# Patient Record
Sex: FEMALE | Race: WHITE | NOT HISPANIC OR LATINO | Employment: UNEMPLOYED | URBAN - METROPOLITAN AREA
[De-identification: names, ages, dates, MRNs, and addresses within clinical notes are randomized per-mention and may not be internally consistent; named-entity substitution may affect disease eponyms.]

---

## 2017-01-30 ENCOUNTER — GENERIC CONVERSION - ENCOUNTER (OUTPATIENT)
Dept: OTHER | Facility: OTHER | Age: 62
End: 2017-01-30

## 2017-06-02 ENCOUNTER — TRANSCRIBE ORDERS (OUTPATIENT)
Dept: ADMINISTRATIVE | Facility: HOSPITAL | Age: 62
End: 2017-06-02

## 2017-06-02 ENCOUNTER — APPOINTMENT (OUTPATIENT)
Dept: LAB | Facility: HOSPITAL | Age: 62
End: 2017-06-02
Attending: INTERNAL MEDICINE
Payer: COMMERCIAL

## 2017-06-02 DIAGNOSIS — E03.9 UNSPECIFIED HYPOTHYROIDISM: ICD-10-CM

## 2017-06-02 DIAGNOSIS — E03.9 UNSPECIFIED HYPOTHYROIDISM: Primary | ICD-10-CM

## 2017-06-02 LAB
ALBUMIN SERPL BCP-MCNC: 3.5 G/DL (ref 3.5–5)
ALP SERPL-CCNC: 138 U/L (ref 46–116)
ALT SERPL W P-5'-P-CCNC: 41 U/L (ref 12–78)
ANION GAP SERPL CALCULATED.3IONS-SCNC: 5 MMOL/L (ref 4–13)
AST SERPL W P-5'-P-CCNC: 36 U/L (ref 5–45)
BASOPHILS # BLD AUTO: 0.03 THOUSANDS/ΜL (ref 0–0.1)
BASOPHILS NFR BLD AUTO: 0 % (ref 0–1)
BILIRUB SERPL-MCNC: 0.6 MG/DL (ref 0.2–1)
BUN SERPL-MCNC: 10 MG/DL (ref 5–25)
CALCIUM SERPL-MCNC: 9 MG/DL (ref 8.3–10.1)
CHLORIDE SERPL-SCNC: 101 MMOL/L (ref 100–108)
CHOLEST SERPL-MCNC: 134 MG/DL (ref 50–200)
CO2 SERPL-SCNC: 34 MMOL/L (ref 21–32)
CREAT SERPL-MCNC: 0.75 MG/DL (ref 0.6–1.3)
CREAT UR-MCNC: 163 MG/DL
EOSINOPHIL # BLD AUTO: 0.49 THOUSAND/ΜL (ref 0–0.61)
EOSINOPHIL NFR BLD AUTO: 3 % (ref 0–6)
ERYTHROCYTE [DISTWIDTH] IN BLOOD BY AUTOMATED COUNT: 13.2 % (ref 11.6–15.1)
EST. AVERAGE GLUCOSE BLD GHB EST-MCNC: 217 MG/DL
GFR SERPL CREATININE-BSD FRML MDRD: >60 ML/MIN/1.73SQ M
GLUCOSE P FAST SERPL-MCNC: 191 MG/DL (ref 65–99)
HBA1C MFR BLD: 9.2 % (ref 4.2–6.3)
HCT VFR BLD AUTO: 39.9 % (ref 34.8–46.1)
HDLC SERPL-MCNC: 42 MG/DL (ref 40–60)
HGB BLD-MCNC: 13.3 G/DL (ref 11.5–15.4)
LDLC SERPL CALC-MCNC: 63 MG/DL (ref 0–100)
LYMPHOCYTES # BLD AUTO: 3.97 THOUSANDS/ΜL (ref 0.6–4.47)
LYMPHOCYTES NFR BLD AUTO: 28 % (ref 14–44)
MCH RBC QN AUTO: 28.7 PG (ref 26.8–34.3)
MCHC RBC AUTO-ENTMCNC: 33.3 G/DL (ref 31.4–37.4)
MCV RBC AUTO: 86 FL (ref 82–98)
MICROALBUMIN UR-MCNC: 10.4 MG/L (ref 0–20)
MICROALBUMIN/CREAT 24H UR: 6 MG/G CREATININE (ref 0–30)
MONOCYTES # BLD AUTO: 0.84 THOUSAND/ΜL (ref 0.17–1.22)
MONOCYTES NFR BLD AUTO: 6 % (ref 4–12)
NEUTROPHILS # BLD AUTO: 8.95 THOUSANDS/ΜL (ref 1.85–7.62)
NEUTS SEG NFR BLD AUTO: 63 % (ref 43–75)
PLATELET # BLD AUTO: 233 THOUSANDS/UL (ref 149–390)
PMV BLD AUTO: 10.9 FL (ref 8.9–12.7)
POTASSIUM SERPL-SCNC: 3.9 MMOL/L (ref 3.5–5.3)
PROT SERPL-MCNC: 7.7 G/DL (ref 6.4–8.2)
RBC # BLD AUTO: 4.64 MILLION/UL (ref 3.81–5.12)
SODIUM SERPL-SCNC: 140 MMOL/L (ref 136–145)
T3 SERPL-MCNC: 1.1 NG/ML (ref 0.6–1.8)
T4 FREE SERPL-MCNC: 1.36 NG/DL (ref 0.76–1.46)
TRIGL SERPL-MCNC: 146 MG/DL
TSH SERPL DL<=0.05 MIU/L-ACNC: 0.36 UIU/ML (ref 0.36–3.74)
WBC # BLD AUTO: 14.28 THOUSAND/UL (ref 4.31–10.16)

## 2017-06-02 PROCEDURE — 80061 LIPID PANEL: CPT

## 2017-06-02 PROCEDURE — 84480 ASSAY TRIIODOTHYRONINE (T3): CPT

## 2017-06-02 PROCEDURE — 84439 ASSAY OF FREE THYROXINE: CPT

## 2017-06-02 PROCEDURE — 82570 ASSAY OF URINE CREATININE: CPT | Performed by: INTERNAL MEDICINE

## 2017-06-02 PROCEDURE — 84443 ASSAY THYROID STIM HORMONE: CPT

## 2017-06-02 PROCEDURE — 85025 COMPLETE CBC W/AUTO DIFF WBC: CPT

## 2017-06-02 PROCEDURE — 83036 HEMOGLOBIN GLYCOSYLATED A1C: CPT

## 2017-06-02 PROCEDURE — 80053 COMPREHEN METABOLIC PANEL: CPT

## 2017-06-02 PROCEDURE — 36415 COLL VENOUS BLD VENIPUNCTURE: CPT

## 2017-06-02 PROCEDURE — 82043 UR ALBUMIN QUANTITATIVE: CPT | Performed by: INTERNAL MEDICINE

## 2017-06-04 ENCOUNTER — GENERIC CONVERSION - ENCOUNTER (OUTPATIENT)
Dept: OTHER | Facility: OTHER | Age: 62
End: 2017-06-04

## 2017-06-13 DIAGNOSIS — D72.829 ELEVATED WHITE BLOOD CELL COUNT: ICD-10-CM

## 2017-06-13 DIAGNOSIS — Z12.31 ENCOUNTER FOR SCREENING MAMMOGRAM FOR MALIGNANT NEOPLASM OF BREAST: ICD-10-CM

## 2017-06-13 DIAGNOSIS — E01.0 IODINE-DEFICIENCY RELATED DIFFUSE GOITER: ICD-10-CM

## 2017-06-13 DIAGNOSIS — M48.061 SPINAL STENOSIS OF LUMBAR REGION: ICD-10-CM

## 2017-06-29 ENCOUNTER — ALLSCRIPTS OFFICE VISIT (OUTPATIENT)
Dept: OTHER | Facility: OTHER | Age: 62
End: 2017-06-29

## 2017-07-17 ENCOUNTER — GENERIC CONVERSION - ENCOUNTER (OUTPATIENT)
Dept: OTHER | Facility: OTHER | Age: 62
End: 2017-07-17

## 2017-07-20 ENCOUNTER — ALLSCRIPTS OFFICE VISIT (OUTPATIENT)
Dept: OTHER | Facility: OTHER | Age: 62
End: 2017-07-20

## 2017-08-01 ENCOUNTER — TRANSCRIBE ORDERS (OUTPATIENT)
Dept: ADMINISTRATIVE | Facility: HOSPITAL | Age: 62
End: 2017-08-01

## 2017-08-01 DIAGNOSIS — H49.02 THIRD NERVE PALSY OF LEFT EYE: Primary | ICD-10-CM

## 2017-08-07 ENCOUNTER — APPOINTMENT (OUTPATIENT)
Dept: LAB | Facility: HOSPITAL | Age: 62
End: 2017-08-07
Payer: COMMERCIAL

## 2017-08-07 DIAGNOSIS — H49.02 THIRD NERVE PALSY OF LEFT EYE: ICD-10-CM

## 2017-08-07 LAB
BUN SERPL-MCNC: 17 MG/DL (ref 5–25)
CREAT SERPL-MCNC: 0.86 MG/DL (ref 0.6–1.3)
GFR SERPL CREATININE-BSD FRML MDRD: 74 ML/MIN/1.73SQ M

## 2017-08-07 PROCEDURE — 84520 ASSAY OF UREA NITROGEN: CPT

## 2017-08-07 PROCEDURE — 36415 COLL VENOUS BLD VENIPUNCTURE: CPT

## 2017-08-07 PROCEDURE — 82565 ASSAY OF CREATININE: CPT

## 2017-08-09 ENCOUNTER — HOSPITAL ENCOUNTER (OUTPATIENT)
Dept: CT IMAGING | Facility: HOSPITAL | Age: 62
Discharge: HOME | End: 2017-08-09
Payer: COMMERCIAL

## 2017-08-09 DIAGNOSIS — H49.02 THIRD NERVE PALSY OF LEFT EYE: ICD-10-CM

## 2017-08-09 PROCEDURE — 70496 CT ANGIOGRAPHY HEAD: CPT

## 2017-08-09 RX ADMIN — IOHEXOL 85 ML: 350 INJECTION, SOLUTION INTRAVENOUS at 10:32

## 2018-01-10 NOTE — RESULT NOTES
Verified Results  VAS LOWER LIMB VENOUS DUPLEX STUDY, UNILATERAL/LIMITED [id number] 01:28PM Naima Hernandez Order Number: [id number]     Test Name Result Flag Reference   VAS LOWER LIMB VENOUS DUPLEX STUDY, UNILATERAL/LIMITED (Report)     [facility]PORT   CLINICAL:   Indications: Other specified soft tissue disorders [M79 89]  C/O pain behind   right knee and calf x 5 days  R/O ruptured Baker's Cyst          CONCLUSION:   Impression:   RIGHT LOWER LIMB: NORMAL   No evidence of acute or chronic deep vein thrombosis   No evidence of superficial thrombophlebitis noted  Doppler evaluation shows a normal response to augmentation maneuvers  Popliteal, posterior tibial and anterior tibial arterial Doppler waveforms are   triphasic  No Baker's Cyst is noted  LEFT LOWER LIMB LIMITED: NORMAL   Evaluation shows no evidence of thrombus in the common femoral vein  Doppler evaluation shows a normal response to augmentation maneuvers  Tech Note: Preliminary report given to Gianfranco Done at Dr Rahul Nguyen office on 3/14/16 @   [id number]        SIGNATURE:   Electronically Signed by: Kristine Gonzalez MD on 2016-03-14 04:58:03 PM

## 2018-01-13 VITALS
WEIGHT: 209 LBS | HEIGHT: 61 IN | DIASTOLIC BLOOD PRESSURE: 70 MMHG | BODY MASS INDEX: 39.46 KG/M2 | HEART RATE: 72 BPM | RESPIRATION RATE: 16 BRPM | SYSTOLIC BLOOD PRESSURE: 138 MMHG

## 2018-01-14 VITALS
RESPIRATION RATE: 16 BRPM | HEART RATE: 72 BPM | TEMPERATURE: 95.3 F | DIASTOLIC BLOOD PRESSURE: 70 MMHG | BODY MASS INDEX: 37.76 KG/M2 | WEIGHT: 200 LBS | SYSTOLIC BLOOD PRESSURE: 140 MMHG | HEIGHT: 61 IN

## 2018-01-17 NOTE — RESULT NOTES
Verified Results  (1) CBC/PLT/DIFF [id number] 09:48AM [facility]     Test Name Result Flag Reference   WBC COUNT 14 28 Thousand/uL H 4 31-10 16   RBC COUNT 4 64 Million/uL  3 81-5 12   HEMOGLOBIN 13 3 g/dL  11 5-15 4   HEMATOCRIT 39 9 %  34 8-46  1   MCV 86 fL  82-98   MCH 28 7 pg  26 8-34 3   MCHC 33 3 g/dL  31 4-37 4   RDW 13 2 %  11 6-15 1   MPV 10 9 fL  8 9-12 7   PLATELET COUNT 042 Thousands/uL  149-390   NEUTROPHILS RELATIVE PERCENT 63 %  43-75   LYMPHOCYTES RELATIVE PERCENT 28 %  14-44   MONOCYTES RELATIVE PERCENT 6 %  4-12   EOSINOPHILS RELATIVE PERCENT 3 %  0-6   BASOPHILS RELATIVE PERCENT 0 %  0-1   NEUTROPHILS ABSOLUTE COUNT 8 95 Thousands/? ??L H 1 85-7 62   LYMPHOCYTES ABSOLUTE COUNT 3 97 Thousands/? ??L  0 60-4 47   MONOCYTES ABSOLUTE COUNT 0 84 Thousand/? ??L  0 17-1 22   EOSINOPHILS ABSOLUTE COUNT 0 49 Thousand/? ??L  0 00-0 61   BASOPHILS ABSOLUTE COUNT 0 03 Thousands/? ??L  0 00-0 10   This bloodwork is non-fasting  Please drink two glasses of water morning of  bloodwork  (1) COMPREHENSIVE METABOLIC PANEL [id number] 66:72ZO [facility]     Test Name Result Flag Reference   SODIUM 140 mmol/L  136-145   POTASSIUM 3 9 mmol/L  3 5-5 3   CHLORIDE 101 mmol/L  100-108   CARBON DIOXIDE 34 mmol/L H 21-32   ANION GAP (CALC) 5 mmol/L  4-13   BLOOD UREA NITROGEN 10 mg/dL  5-25   CREATININE 0 75 mg/dL  0 60-1 30   Standardized to IDMS reference method   CALCIUM 9 0 mg/dL  8 3-10 1   BILI, TOTAL 0 60 mg/dL  0 20-1 00   ALK PHOSPHATAS 138 U/L H    ALT (SGPT) 41 U/L  12-78   AST(SGOT) 36 U/L  5-45   ALBUMIN 3 5 g/dL  3 5-5 0   TOTAL PROTEIN 7 7 g/dL  6 4-8 2   eGFR Non-African American      >60 0 ml/min/1 73sq m   [facility] Disease Education Program recommendations are as follows:  GFR calculation is accurate only with a steady state creatinine  Chronic Kidney disease less than 60 ml/min/1 73 sq  meters  Kidney failure less than 15 ml/min/1 73 sq  meters     GLUCOSE FASTING 191 mg/dL H 65-99     (1) LIPID PANEL, FASTING [id number] 09:48AM Jose Arana     Test Name Result Flag Reference   CHOLESTEROL 134 mg/dL     HDL,DIRECT 42 mg/dL  40-60   Specimen collection should occur prior to Metamizole administration due to the potential for falsely depressed results  LDL CHOLESTEROL CALCULATED 63 mg/dL  0-100   This is a fasting blood test  Water,black tea or black  coffee only after 9:00pm the night before test  Drink 2 glasses of water the morning of test         Triglyceride:         Normal              <150 mg/dl       Borderline High    150-199 mg/dl       High               200-499 mg/dl       Very High          >499 mg/dl  Cholesterol:         Desirable        <200 mg/dl      Borderline High  200-239 mg/dl      High             >239 mg/dl  HDL Cholesterol:        High    >59 mg/dL      Low     <41 mg/dL  LDL CALCULATED:    This screening LDL is a calculated result  It does not have the accuracy of the Direct Measured LDL in the monitoring of patients with hyperlipidemia and/or statin therapy  Direct Measure LDL (HMH954) must be ordered separately in these patients  TRIGLYCERIDES 146 mg/dL  <=150   Specimen collection should occur prior to N-Acetylcysteine or Metamizole administration due to the potential for falsely depressed results  (1) TSH [id number] 09:48AM Jose Arana     Test Name Result Flag Reference   TSH 0 360 uIU/mL  0 358-3 740   This bloodwork is non-fasting  Please drink two glasses of water morning of  bloodwork  Patients undergoing fluorescein dye angiography may retain small amounts of fluorescein in the body for 48-72 hours post procedure  Samples containing fluorescein can produce falsely depressed TSH values  If the patient had this procedure,a specimen should be resubmitted post fluorescein clearance            The recommended reference ranges for TSH during pregnancy are as follows:  First trimester 0 1 to 2 5 uIU/mL  Second trimester  0 2 to 3 0 uIU/mL  Third trimester 0 3 to 3 0 uIU/m     (1) T3 TOTAL 02Jun2017 09:48AM Danie Nett     Test Name Result Flag Reference   T3 1 1 ng/mL  0 6-1 8     (1) T4, FREE 02Jun2017 09:48AM Danie Nett     Test Name Result Flag Reference   T4,FREE 1 36 ng/dL  0 76-1 46     (1) MICROALBUMIN CREATININE RATIO, RANDOM URINE 02Jun2017 09:48AM Danie Nett     Test Name Result Flag Reference   MICROALBUMIN/ CREAT R 6 mg/g creatinine  0-30   MICROALBUMIN,URINE 10 4 mg/L  0 0-20 0   CREATININE URINE 163 0 mg/dL       (1) HEMOGLOBIN A1C [id number] 09:48AM [facility] Nett     Test Name Result Flag Reference   HEMOGLOBIN A1C 9 2 % H 4 2-6 3   EST  AVG   GLUCOSE 217 mg/dl

## 2018-01-24 NOTE — PROGRESS NOTES
Assessment   1  Benign essential hypertension (401 1) (I10)  2  Diabetes (250 00) (E11 9)  3  Hypothyroidism (244 9) (E03 9)  4  Osteoarthritis of both knees (715 96) (M17 0)  5  Thyromegaly (240 9) (E01 0)  6  Lumbar stenosis with neurogenic claudication (724 03) (M48 06)    Plan  Diabetes    · *VB - Foot Exam; Status:Complete - Retrospective Authorization;   Done: 93FLQ6026  08:36AM  Lumbar stenosis with neurogenic claudication    · * MRI LUMBAR SPINE WO CONTRAST; Status:Need Information - Financial  Authorization; Requested RQO:[id number];   PMH: Right leg swelling    · Renew: TraMADol HCl - 50 MG Oral Tablet; take 1 tablet by mouth three times a day if  needed  Thyromegaly    · US THYROID; Status:Hold For - Scheduling; Requested QNP:[id number];     Discussion/Summary  Currently, she eats a healthy diet and has an inadequate exercise regimen  Patient discussion: discussed with the patient  Self Referrals: No      Chief Complaint  Pt here for HM today  no depress--frm ord  do foot---i ord and resul--feet ready  dk    nsr since last here  History of Present Illness  HM, Adult Female: The patient is being seen for a health maintenance evaluation  Social History: Household members include spouse and adult children  She is   Work status: [employer]  The patient has never smoked cigarettes  She reports never drinking alcohol  She has never used illicit drugs  General Health: The patient's health since the last visit is described as good  She has regular dental visits  Lifestyle:  She consumes a diverse and healthy diet  She does not exercise regularly  Screening:   HPI: Diabetes being cared by [facility] in [facility] have been made  Hypertension (Follow-Up): The patient presents for follow-up of essential hypertension  Symptoms:   Osteoarthritis (Follow-Up):  The patient states her osteoarthritis has been has had let knee replaces-but pain mostly in the thighs and back of calves, but worse since the last visit  Symptoms:      Review of Systems    Constitutional: No fever, no chills, feels well, no tiredness, no recent weight gain or weight loss  Cardiovascular: No complaints of slow heart rate, no fast heart rate, no chest pain, no palpitations, no leg claudication, no lower extremity edema  Respiratory: No complaints of shortness of breath, no wheezing, no cough, no SOB on exertion, no orthopnea, no PND  Gastrointestinal: No complaints of abdominal pain, no constipation, no nausea or vomiting, no diarrhea, no bloody stools  Active Problems   1  Antibiotic prophylaxis for dental procedure indicated due to prior joint replacement   (V58 62) (Z79 2)  2  Asthma (493 90) (J45 909)  3  Benign essential hypertension (401 1) (I10)  4  Diabetes (250 00) (E11 9)  5  Elevated WBC count (288 60) (D72 829)  6  Hypothyroidism (244 9) (E03 9)  7  Need for immunization against influenza (V04 81) (Z23)  8  Osteoarthrosis, not specified whether generalized/localized, lower leg (715 96) (M17 9)  9  Screening for colon cancer (V76 51) (Z12 11)  10  Sicca syndrome (710 2) (M35 00)  11   Sjogrens syndrome (710 2) (M35 00)    Past Medical History    · History of cardiac catheterization (V45 89) (Z98 890)   · History of Obesity (278 00) (E66 9)   · History of TMJ (dislocation of temporomandibular joint) (830 0) (S03 00XA)   · History of Trigeminal neuralgia (350 1) (G50 0)    Surgical History    · History of Hysterectomy   · History of Tonsillectomy    Family History  Mother    · Family history of Colon cancer  Father    · Family history of Dementia   · Family history of Depression   · Family history of Diabetes mellitus   · Family history of cerebrovascular accident (V17 1) (Z82 3)   · Family history of Parkinsons disease  Family History    · Family history of GI malignancy (V16 0) (Z80 0)    Social History    · Caffeine use (V49 89) (F15 90)   · 1-2 TIMES PER WEEK   · Dental care, regularly   · Former smoker (V15 82) (Z87 891)   · QUIT OVER 10 YEARS AGO   · Living Situation: Supportive and safe   ·    · No advance directive on file (V49 89) (Z78 9)   · Non drinker / no alcohol use    Current Meds  1  Amoxicillin 500 MG Oral Capsule; TAKE (4) CAPSULES ONE HOUR PRIOR TO DENTAL   PROCEDURE; Therapy: 69Avc7268 to (Last Rx:56Lzj6304)  Requested for: 82Gpo3608 Ordered  2  Aspirin 81 MG TABS; Take 1 tablet daily; Therapy: 65GAS9600 to Recorded  3  Diclofenac Potassium 50 MG Oral Tablet; TAKE 1 TABLET THREE TIMES A DAY AS   NEEDED FOR JOINT PAIN;   Therapy: 06JVW3491 to (Last Rx:34Iwe8147)  Requested for: 49Bkx8860 Ordered  4  Lantus SoloStar 100 UNIT/ML Subcutaneous Solution Pen-injector; 40 units bid     by   endocrine; Therapy: 74ZWJ3582 to Recorded  5  Levemir FlexTouch 100 UNIT/ML Subcutaneous Solution Pen-injector; INJECT 28 UNIT   Twice daily; Therapy: 20YOZ0915 to (Last Rx:49Mxz4380)  Requested for: 05Owm2561 Ordered  6  Levothyroxine Sodium 137 MCG Oral Tablet; TAKE 1 TABLET DAILY; Therapy: 18CRA7596 to (Evaluate:55Ecy4126)  Requested for: 74UDU7583; Last   Rx:56Ibg7448 Ordered  7  Metoprolol Tartrate 50 MG Oral Tablet; TAKE ONE TABLET BY MOUTH TWICE A DAY; Therapy: 51UGM5163 to (Evaluate:21Jun2018)  Requested for: 94HWJ0409; Last   CS:40ZUS6006 Ordered  8  NovoFine 32G X 6 MM Miscellaneous; USING TWO DAILY AND OR AS DIRECTED; Therapy: 72COH6763 to (Last Rx:18Uah7983)  Requested for: 71RGL7714 Ordered  9  NovoLOG FlexPen 100 UNIT/ML Subcutaneous Solution Pen-injector; INJECT 30 UNITS   with meals; Therapy: 81MMU6624 to  Requested for: 49ZHY8522 Recorded  10  OneTouch Ultra Blue In [employer]; 2 new strips daily; Therapy: 64KHN4737 to ([place])  Requested for: 23GWQ5901; Last    VW:38HGQ1546 Ordered  11  Simvastatin 20 MG Oral Tablet; TAKE 1 TABLET DAILY AS DIRECTED;     Therapy: 24NYS5519 to (Evaluate:53Yni5211)  Requested for: 84NEU7703; Last    RQ:32JSU3803 Ordered  12  TraMADol HCl - 50 MG Oral Tablet; take 1 tablet by mouth three times a day if needed; Therapy: 15SQF4951 to (Evaluate:03Apr2016); Last Rx:14Mar2016 Ordered  13  Valsartan-Hydrochlorothiazide 160-25 MG Oral Tablet; TAKE 1 TABLET DAILY; Therapy: 67Qqf7202 to (Evaluate:16Rzf9446)  Requested for: 42QFD7599; Last    Rx:59Fur3321 Ordered    Allergies   1  Actos TABS  2  Altace TABS  3  Ceftin TABS  4  Cefzil TABS  5  Cipro TABS  6  Niacin TABS    Vitals   Recorded: 36IDB3136 08:48AM Recorded: 85MEM9494 08:24AM   Heart Rate  72   Respiration  16   Systolic 019 893   Diastolic 70 68   Height  5 ft 1 in   Weight  209 lb    BMI Calculated  39 49   BSA Calculated  1 92     Physical Exam    Constitutional   General appearance: No acute distress, well appearing and well nourished  Ears, Nose, Mouth, and Throat   Oropharynx: Normal with no erythema, edema, exudate or lesions  Neck   Neck: Supple, symmetric, trachea midline, no masses  Thyroid: Abnormal   generous thyroid  Pulmonary   Auscultation of lungs: Clear to auscultation  Cardiovascular   Auscultation of heart: Normal rate and rhythm, normal S1 and S2, no murmurs  Abdomen   Abdomen: Non-tender, no masses  Liver and spleen: No hepatomegaly or splenomegaly  Musculoskeletal has djd changes on the spine-ok ROM of the hips neg SLR-has no motor loss  Skin1  Diabetic foot exam is done in this spot  There are no lesions or toes are normal  Monofilament testing is normal  Her pulses are +2 in the dorsalis pedis and posterior tibial pulses  1         1 Amended By: Huy Cabrera; Jun 29 2017 9:29 AM EST    Results/Data  PHQ-2 Adult Depression Screening 29Jun2017 08:36AM User, s     Test Name Result Flag Reference   PHQ-2 Adult Depression Score 0     Over the last two weeks, how often have you been bothered by any of the following problems?   Little interest or pleasure in doing things: Not at all - 0  Feeling down, depressed, or hopeless: Not at all - 0   PHQ-2 Adult Depression Screening Negative       *VB - Foot Exam 93UGA4996 08:36AM Anish Grimm     Test Name Result Flag Reference   FOOT Francesca Frederick 27LSZ5780         Provider Comments  Provider Comments:   Needs to get better control of diabetes        Signatures   Electronically signed by : YAMILET Ramirez ; Jun 29 2017  9:29AM EST                       (Author)

## 2018-06-25 ENCOUNTER — OFFICE VISIT (OUTPATIENT)
Dept: FAMILY MEDICINE CLINIC | Facility: HOSPITAL | Age: 63
End: 2018-06-25
Payer: COMMERCIAL

## 2018-06-25 VITALS
HEART RATE: 78 BPM | WEIGHT: 207 LBS | BODY MASS INDEX: 39.08 KG/M2 | DIASTOLIC BLOOD PRESSURE: 78 MMHG | SYSTOLIC BLOOD PRESSURE: 148 MMHG | HEIGHT: 61 IN

## 2018-06-25 DIAGNOSIS — M17.0 PRIMARY OSTEOARTHRITIS OF BOTH KNEES: ICD-10-CM

## 2018-06-25 DIAGNOSIS — I10 BENIGN ESSENTIAL HYPERTENSION: ICD-10-CM

## 2018-06-25 DIAGNOSIS — N63.20 LEFT BREAST MASS: Primary | ICD-10-CM

## 2018-06-25 DIAGNOSIS — E11.9 TYPE 2 DIABETES MELLITUS WITHOUT COMPLICATION, WITHOUT LONG-TERM CURRENT USE OF INSULIN (HCC): ICD-10-CM

## 2018-06-25 PROBLEM — M48.062 LUMBAR STENOSIS WITH NEUROGENIC CLAUDICATION: Status: ACTIVE | Noted: 2017-06-29

## 2018-06-25 PROBLEM — E01.0 THYROMEGALY: Status: ACTIVE | Noted: 2017-06-29

## 2018-06-25 PROBLEM — F41.9 ANXIETY: Status: ACTIVE | Noted: 2017-07-20

## 2018-06-25 PROCEDURE — 99213 OFFICE O/P EST LOW 20 MIN: CPT | Performed by: INTERNAL MEDICINE

## 2018-06-25 RX ORDER — DICLOFENAC POTASSIUM 50 MG/1
TABLET, FILM COATED ORAL
COMMUNITY
Start: 2014-01-28 | End: 2018-10-09 | Stop reason: SDUPTHER

## 2018-06-25 RX ORDER — SIMVASTATIN 20 MG
TABLET ORAL
COMMUNITY
Start: 2018-06-17 | End: 2018-10-09 | Stop reason: SDUPTHER

## 2018-06-25 RX ORDER — TRAMADOL HYDROCHLORIDE 50 MG/1
50 TABLET ORAL 3 TIMES DAILY PRN
Qty: 30 TABLET | Refills: 0 | Status: SHIPPED | OUTPATIENT
Start: 2018-06-25 | End: 2018-10-09 | Stop reason: SDUPTHER

## 2018-06-25 RX ORDER — TRAMADOL HYDROCHLORIDE 50 MG/1
1 TABLET ORAL 3 TIMES DAILY PRN
COMMUNITY
Start: 2016-03-14 | End: 2018-06-25 | Stop reason: SDUPTHER

## 2018-06-25 RX ORDER — METOPROLOL TARTRATE 50 MG/1
TABLET, FILM COATED ORAL
COMMUNITY
Start: 2018-06-17 | End: 2018-10-09 | Stop reason: SDUPTHER

## 2018-06-25 RX ORDER — INSULIN GLARGINE 100 [IU]/ML
36 INJECTION, SOLUTION SUBCUTANEOUS EVERY 12 HOURS SCHEDULED
Qty: 5 PEN | Refills: 0 | Status: SHIPPED | OUTPATIENT
Start: 2018-06-25 | End: 2018-07-30 | Stop reason: SDUPTHER

## 2018-06-25 RX ORDER — LEVOTHYROXINE SODIUM 137 UG/1
TABLET ORAL
COMMUNITY
Start: 2018-06-17 | End: 2018-08-24

## 2018-06-25 RX ORDER — CLINDAMYCIN HYDROCHLORIDE 300 MG/1
CAPSULE ORAL
Refills: 0 | COMMUNITY
Start: 2018-04-02 | End: 2018-08-24 | Stop reason: CLARIF

## 2018-06-25 RX ORDER — BLOOD SUGAR DIAGNOSTIC
STRIP MISCELLANEOUS
COMMUNITY
Start: 2014-10-08

## 2018-06-25 RX ORDER — VALSARTAN AND HYDROCHLOROTHIAZIDE 160; 25 MG/1; MG/1
TABLET ORAL
COMMUNITY
Start: 2018-06-17 | End: 2018-10-09 | Stop reason: SDUPTHER

## 2018-06-25 RX ORDER — AMOXICILLIN 500 MG/1
4 CAPSULE ORAL
COMMUNITY
Start: 2016-12-12 | End: 2019-10-15 | Stop reason: ALTCHOICE

## 2018-06-25 RX ORDER — INSULIN GLARGINE 100 [IU]/ML
INJECTION, SOLUTION SUBCUTANEOUS
COMMUNITY
Start: 2018-05-13 | End: 2018-06-25 | Stop reason: SDUPTHER

## 2018-06-25 NOTE — PROGRESS NOTES
Assessment/Plan:         Problem List Items Addressed This Visit        Endocrine    Type 2 diabetes mellitus without complication, without long-term current use of insulin (HCC)    Relevant Medications    insulin aspart (NOVOLOG FLEXPEN) 100 Units/mL injection pen    LANTUS SOLOSTAR 100 units/mL injection pen    Other Relevant Orders    CBC and differential    Comprehensive metabolic panel    Hemoglobin A1C    Lipid Panel with Direct LDL reflex    Microalbumin / creatinine urine ratio       Cardiovascular and Mediastinum    Benign essential hypertension    Relevant Medications    metoprolol tartrate (LOPRESSOR) 50 mg tablet    valsartan-hydrochlorothiazide (DIOVAN-HCT) 160-25 MG per tablet       Musculoskeletal and Integument    Osteoarthritis of both knees    Relevant Medications    traMADol (ULTRAM) 50 mg tablet      Other Visit Diagnoses     Left breast mass    -  Primary    Relevant Orders    Mammo diagnostic bilateral w 3d & cad    US breast left complete (abus)            Subjective:      Patient ID: Severiano Coppersmith is a 66 y o  female    HPI  Here for special appt for left breast mass- has noted thickening 2 years ago in area then seemed to go away but now is recurrent   Van not had a mamogram for years   Had hysterectomy but no gyn exam for years  sees Dr ramo Hood for her diabetes- has been over a year since labs0- given new slip today and to set up for appt  The following portions of the patient's history were reviewed and updated as appropriate: allergies, current medications and problem list      Review of Systems   Constitutional: Negative for chills and fatigue  HENT: Negative for congestion and dental problem  Respiratory: Negative for apnea, cough and choking  Gastrointestinal: Negative for abdominal distention and abdominal pain  All other systems reviewed and are negative          Objective:      Current Outpatient Prescriptions:     amoxicillin (AMOXIL) 500 mg capsule, Take 4 capsules by mouth, Disp: , Rfl:     aspirin 81 MG tablet, Take 1 tablet by mouth daily, Disp: , Rfl:     diclofenac potassium (CATAFLAM) 50 mg tablet, Take by mouth, Disp: , Rfl:     insulin aspart (NOVOLOG FLEXPEN) 100 Units/mL injection pen, Inject under the skin, Disp: , Rfl:     Insulin Syringe-Needle U-100 (B-D INS SYR ULTRAFINE  3CC/31G) 31G X 5/16" 0 3 ML MISC, by Does not apply route, Disp: , Rfl:     traMADol (ULTRAM) 50 mg tablet, Take 1 tablet (50 mg total) by mouth 3 (three) times a day as needed for moderate pain, Disp: 30 tablet, Rfl: 0    clindamycin (CLEOCIN) 300 MG capsule, TAKE TWO CAPSULES 1 HOUR PRIOR TO APPT , Disp: , Rfl: 0    LANTUS SOLOSTAR 100 units/mL injection pen, Inject 36 Units under the skin every 12 (twelve) hours, Disp: 5 pen, Rfl: 0    levothyroxine 137 mcg tablet, , Disp: , Rfl:     metoprolol tartrate (LOPRESSOR) 50 mg tablet, , Disp: , Rfl:     simvastatin (ZOCOR) 20 mg tablet, , Disp: , Rfl:     valsartan-hydrochlorothiazide (DIOVAN-HCT) 160-25 MG per tablet, , Disp: , Rfl:        Physical Exam   Constitutional: She appears well-developed and well-nourished  No distress  Abdominal: Soft  Bowel sounds are normal    Musculoskeletal: She exhibits no edema or deformity  Skin:   Left breast with firm nodule in inner aspect of upper left breast   Nursing note and vitals reviewed

## 2018-07-06 DIAGNOSIS — E11.8 TYPE 2 DIABETES MELLITUS WITH COMPLICATION, WITH LONG-TERM CURRENT USE OF INSULIN (HCC): Primary | ICD-10-CM

## 2018-07-06 DIAGNOSIS — Z79.4 TYPE 2 DIABETES MELLITUS WITH COMPLICATION, WITH LONG-TERM CURRENT USE OF INSULIN (HCC): Primary | ICD-10-CM

## 2018-07-06 RX ORDER — BLOOD-GLUCOSE METER
KIT MISCELLANEOUS
Qty: 1 EACH | Refills: 0 | Status: SHIPPED | OUTPATIENT
Start: 2018-07-06

## 2018-07-30 DIAGNOSIS — E11.9 TYPE 2 DIABETES MELLITUS WITHOUT COMPLICATION, WITHOUT LONG-TERM CURRENT USE OF INSULIN (HCC): ICD-10-CM

## 2018-07-30 RX ORDER — INSULIN GLARGINE 100 [IU]/ML
INJECTION, SOLUTION SUBCUTANEOUS
Qty: 15 ML | Refills: 0 | Status: SHIPPED | OUTPATIENT
Start: 2018-07-30 | End: 2018-08-29 | Stop reason: SDUPTHER

## 2018-08-21 ENCOUNTER — APPOINTMENT (OUTPATIENT)
Dept: LAB | Facility: HOSPITAL | Age: 63
End: 2018-08-21
Attending: INTERNAL MEDICINE
Payer: COMMERCIAL

## 2018-08-21 DIAGNOSIS — E11.9 TYPE 2 DIABETES MELLITUS WITHOUT COMPLICATION, WITHOUT LONG-TERM CURRENT USE OF INSULIN (HCC): ICD-10-CM

## 2018-08-21 LAB
ALBUMIN SERPL BCP-MCNC: 3.4 G/DL (ref 3.5–5)
ALP SERPL-CCNC: 143 U/L (ref 46–116)
ALT SERPL W P-5'-P-CCNC: 30 U/L (ref 12–78)
ANION GAP SERPL CALCULATED.3IONS-SCNC: 4 MMOL/L (ref 4–13)
AST SERPL W P-5'-P-CCNC: 18 U/L (ref 5–45)
BASOPHILS # BLD AUTO: 0.04 THOUSANDS/ΜL (ref 0–0.1)
BASOPHILS NFR BLD AUTO: 0 % (ref 0–1)
BILIRUB SERPL-MCNC: 0.5 MG/DL (ref 0.2–1)
BUN SERPL-MCNC: 13 MG/DL (ref 5–25)
CALCIUM SERPL-MCNC: 8.9 MG/DL (ref 8.3–10.1)
CHLORIDE SERPL-SCNC: 102 MMOL/L (ref 100–108)
CHOLEST SERPL-MCNC: 126 MG/DL (ref 50–200)
CO2 SERPL-SCNC: 31 MMOL/L (ref 21–32)
CREAT SERPL-MCNC: 0.78 MG/DL (ref 0.6–1.3)
CREAT UR-MCNC: 165 MG/DL
EOSINOPHIL # BLD AUTO: 0.64 THOUSAND/ΜL (ref 0–0.61)
EOSINOPHIL NFR BLD AUTO: 5 % (ref 0–6)
ERYTHROCYTE [DISTWIDTH] IN BLOOD BY AUTOMATED COUNT: 12.8 % (ref 11.6–15.1)
EST. AVERAGE GLUCOSE BLD GHB EST-MCNC: 177 MG/DL
GFR SERPL CREATININE-BSD FRML MDRD: 82 ML/MIN/1.73SQ M
GLUCOSE P FAST SERPL-MCNC: 175 MG/DL (ref 65–99)
HBA1C MFR BLD: 7.8 % (ref 4.2–6.3)
HCT VFR BLD AUTO: 38.4 % (ref 34.8–46.1)
HDLC SERPL-MCNC: 39 MG/DL (ref 40–60)
HGB BLD-MCNC: 12.6 G/DL (ref 11.5–15.4)
IMM GRANULOCYTES # BLD AUTO: 0.05 THOUSAND/UL (ref 0–0.2)
IMM GRANULOCYTES NFR BLD AUTO: 0 % (ref 0–2)
LDLC SERPL CALC-MCNC: 66 MG/DL (ref 0–100)
LYMPHOCYTES # BLD AUTO: 3.95 THOUSANDS/ΜL (ref 0.6–4.47)
LYMPHOCYTES NFR BLD AUTO: 30 % (ref 14–44)
MCH RBC QN AUTO: 28.1 PG (ref 26.8–34.3)
MCHC RBC AUTO-ENTMCNC: 32.8 G/DL (ref 31.4–37.4)
MCV RBC AUTO: 86 FL (ref 82–98)
MICROALBUMIN UR-MCNC: 11.5 MG/L (ref 0–20)
MICROALBUMIN/CREAT 24H UR: 7 MG/G CREATININE (ref 0–30)
MONOCYTES # BLD AUTO: 0.86 THOUSAND/ΜL (ref 0.17–1.22)
MONOCYTES NFR BLD AUTO: 7 % (ref 4–12)
NEUTROPHILS # BLD AUTO: 7.77 THOUSANDS/ΜL (ref 1.85–7.62)
NEUTS SEG NFR BLD AUTO: 58 % (ref 43–75)
NRBC BLD AUTO-RTO: 0 /100 WBCS
PLATELET # BLD AUTO: 271 THOUSANDS/UL (ref 149–390)
PMV BLD AUTO: 11 FL (ref 8.9–12.7)
POTASSIUM SERPL-SCNC: 4.5 MMOL/L (ref 3.5–5.3)
PROT SERPL-MCNC: 7.7 G/DL (ref 6.4–8.2)
RBC # BLD AUTO: 4.48 MILLION/UL (ref 3.81–5.12)
SODIUM SERPL-SCNC: 137 MMOL/L (ref 136–145)
TRIGL SERPL-MCNC: 106 MG/DL
WBC # BLD AUTO: 13.31 THOUSAND/UL (ref 4.31–10.16)

## 2018-08-21 PROCEDURE — 80061 LIPID PANEL: CPT

## 2018-08-21 PROCEDURE — 36415 COLL VENOUS BLD VENIPUNCTURE: CPT

## 2018-08-21 PROCEDURE — 80053 COMPREHEN METABOLIC PANEL: CPT

## 2018-08-21 PROCEDURE — 82570 ASSAY OF URINE CREATININE: CPT

## 2018-08-21 PROCEDURE — 83036 HEMOGLOBIN GLYCOSYLATED A1C: CPT

## 2018-08-21 PROCEDURE — 85025 COMPLETE CBC W/AUTO DIFF WBC: CPT

## 2018-08-21 PROCEDURE — 82043 UR ALBUMIN QUANTITATIVE: CPT

## 2018-08-21 PROCEDURE — 3061F NEG MICROALBUMINURIA REV: CPT | Performed by: INTERNAL MEDICINE

## 2018-08-24 ENCOUNTER — OFFICE VISIT (OUTPATIENT)
Dept: FAMILY MEDICINE CLINIC | Facility: HOSPITAL | Age: 63
End: 2018-08-24
Payer: COMMERCIAL

## 2018-08-24 ENCOUNTER — APPOINTMENT (OUTPATIENT)
Dept: LAB | Facility: HOSPITAL | Age: 63
End: 2018-08-24
Attending: INTERNAL MEDICINE
Payer: COMMERCIAL

## 2018-08-24 VITALS
WEIGHT: 211.5 LBS | HEIGHT: 61 IN | SYSTOLIC BLOOD PRESSURE: 128 MMHG | HEART RATE: 64 BPM | DIASTOLIC BLOOD PRESSURE: 78 MMHG | BODY MASS INDEX: 39.93 KG/M2

## 2018-08-24 DIAGNOSIS — E03.9 ACQUIRED HYPOTHYROIDISM: Primary | ICD-10-CM

## 2018-08-24 DIAGNOSIS — I10 BENIGN ESSENTIAL HYPERTENSION: Primary | ICD-10-CM

## 2018-08-24 DIAGNOSIS — E11.9 TYPE 2 DIABETES MELLITUS WITHOUT COMPLICATION, WITHOUT LONG-TERM CURRENT USE OF INSULIN (HCC): ICD-10-CM

## 2018-08-24 DIAGNOSIS — E03.9 ACQUIRED HYPOTHYROIDISM: ICD-10-CM

## 2018-08-24 DIAGNOSIS — M35.00 H/O SJOGREN'S DISEASE (HCC): ICD-10-CM

## 2018-08-24 DIAGNOSIS — M35.00 SJOGREN'S SYNDROME, WITH UNSPECIFIED ORGAN INVOLVEMENT (HCC): ICD-10-CM

## 2018-08-24 DIAGNOSIS — I73.9 CLAUDICATION (HCC): ICD-10-CM

## 2018-08-24 LAB — TSH SERPL DL<=0.05 MIU/L-ACNC: 0.04 UIU/ML (ref 0.36–3.74)

## 2018-08-24 PROCEDURE — 36415 COLL VENOUS BLD VENIPUNCTURE: CPT

## 2018-08-24 PROCEDURE — 3008F BODY MASS INDEX DOCD: CPT | Performed by: INTERNAL MEDICINE

## 2018-08-24 PROCEDURE — 84443 ASSAY THYROID STIM HORMONE: CPT

## 2018-08-24 PROCEDURE — 99214 OFFICE O/P EST MOD 30 MIN: CPT | Performed by: INTERNAL MEDICINE

## 2018-08-24 NOTE — PROGRESS NOTES
Assessment/Plan:       Diagnoses and all orders for this visit:    Benign essential hypertension    Type 2 diabetes mellitus without complication, without long-term current use of insulin ([facility])    Acquired hypothyroidism  -     TSH, 3rd generation; Future    Sjogren's syndrome, with unspecified organ involvement ([facility] [address] )    H/O Sjogren's disease    Claudication ([facility] [address] )  -     VAS lower limb arterial duplex, complete bilateral; Future          All of the above diagnoses have been assessed  Additional COMMENTS/PLAN:   Will see back in 6 weeks with attention to her symptoms  If the vascular studies are negative will probably need to refer to physical therapy and then if that does not resolve the problem will need to get an MRI of the lumbar spine  Subjective:      Patient ID: Yesica Tang is a 66 y o  female  HPI     Leg pain-this is when she stands in back on the legs  Also gets pain in the lower legs with < 1 block on level ground  Did have some edema earlier in week  DM-  Hemoglobin A1c 7 8  She will drop all sugars  HTN-  Compliant with medications and salt restriction      The following portions of the patient's history were revised and updated as appropriate: Problem list, allergies, med list, FH, SH, Past medical and surgical histories      Current Outpatient Prescriptions   Medication Sig Dispense Refill    amoxicillin (AMOXIL) 500 mg capsule Take 4 capsules by mouth      aspirin 81 MG tablet Take 1 tablet by mouth daily      diclofenac potassium (CATAFLAM) 50 mg tablet Take by mouth      glucose blood (FREESTYLE TEST STRIPS) test strip Testing 4 times daily - Dx- E11 9 100 each 5    glucose monitoring kit (FREESTYLE) monitoring kit Testing 4 times daily - DX- E11 9 1 each 0    insulin aspart (NOVOLOG FLEXPEN) 100 Units/mL injection pen Inject 20 Units under the skin Before meals or sliding       Insulin Syringe-Needle U-100 (B-D INS SYR ULTRAFINE  3CC/31G) 31G X 5/16" 0 3 ML MISC by Does not apply route      LANTUS SOLOSTAR 100 units/mL injection pen INJECT 36 UNITS UNDER THE SKIN EVERY 12 HOURS 15 mL 0    levothyroxine 137 mcg tablet       metoprolol tartrate (LOPRESSOR) 50 mg tablet       simvastatin (ZOCOR) 20 mg tablet       traMADol (ULTRAM) 50 mg tablet Take 1 tablet (50 mg total) by mouth 3 (three) times a day as needed for moderate pain 30 tablet 0    valsartan-hydrochlorothiazide (DIOVAN-HCT) 160-25 MG per tablet        No current facility-administered medications for this visit  Review of Systems   All other systems reviewed and are negative  Objective:    /78 (BP Location: Left arm, Patient Position: Sitting, Cuff Size: Large)   Pulse 64   Ht 5' 1" (1 549 m)   Wt 95 9 kg (211 lb 8 oz)   BMI 39 96 kg/m²      Physical Exam   Constitutional: She is oriented to person, place, and time  She appears well-nourished  Neck: No JVD present  Cardiovascular: Normal rate and regular rhythm  Pulses are weak pulses  Pulses:       Dorsalis pedis pulses are 1+ on the left side  Posterior tibial pulses are 1+ on the right side, and 0 on the left side  Pulmonary/Chest: Effort normal and breath sounds normal    Abdominal: Soft  Bowel sounds are normal    Musculoskeletal: She exhibits no edema  Feet:   Right Foot:   Skin Integrity: Negative for ulcer, skin breakdown, erythema, warmth, callus or dry skin  Left Foot:   Skin Integrity: Negative for ulcer, skin breakdown, erythema, warmth, callus or dry skin  Neurological: She is alert and oriented to person, place, and time  Skin: Skin is warm and dry  Vitals reviewed  Patient's shoes and socks removed  Right Foot/Ankle   Right Foot Inspection  Skin Exam: skin normal and skin intact no dry skin, no warmth, no callus, no erythema, no maceration, no abnormal color, no pre-ulcer, no ulcer and no callus                            Sensory       Monofilament testing: intact  Vascular    The right PT pulse is 1+  Left Foot/Ankle  Left Foot Inspection  Skin Exam: skin normal and skin intactno dry skin, no warmth, no erythema, no maceration, normal color, no pre-ulcer, no ulcer and no callus                                         Sensory       Monofilament: intact  Vascular    The left DP pulse is 1+  The left PT pulse is 0     Assign Risk Category:  No deformity present; ; Weak pulses       Risk: 1    Appointment on 08/21/2018   Component Date Value Ref Range Status    WBC 08/21/2018 13 31* 4 31 - 10 16 Thousand/uL Final    RBC 08/21/2018 4 48  3 81 - 5 12 Million/uL Final    Hemoglobin 08/21/2018 12 6  11 5 - 15 4 g/dL Final    Hematocrit 08/21/2018 38 4  34 8 - 46 1 % Final    MCV 08/21/2018 86  82 - 98 fL Final    MCH 08/21/2018 28 1  26 8 - 34 3 pg Final    MCHC 08/21/2018 32 8  31 4 - 37 4 g/dL Final    RDW 08/21/2018 12 8  11 6 - 15 1 % Final    MPV 08/21/2018 11 0  8 9 - 12 7 fL Final    Platelets 62/34/2356 271  149 - 390 Thousands/uL Final    nRBC 08/21/2018 0  /100 WBCs Final    Neutrophils Relative 08/21/2018 58  43 - 75 % Final    Immat GRANS % 08/21/2018 0  0 - 2 % Final    Lymphocytes Relative 08/21/2018 30  14 - 44 % Final    Monocytes Relative 08/21/2018 7  4 - 12 % Final    Eosinophils Relative 08/21/2018 5  0 - 6 % Final    Basophils Relative 08/21/2018 0  0 - 1 % Final    Neutrophils Absolute 08/21/2018 7 77* 1 85 - 7 62 Thousands/µL Final    Immature Grans Absolute 08/21/2018 0 05  0 00 - 0 20 Thousand/uL Final    Lymphocytes Absolute 08/21/2018 3 95  0 60 - 4 47 Thousands/µL Final    Monocytes Absolute 08/21/2018 0 86  0 17 - 1 22 Thousand/µL Final    Eosinophils Absolute 08/21/2018 0 64* 0 00 - 0 61 Thousand/µL Final    Basophils Absolute 08/21/2018 0 04  0 00 - 0 10 Thousands/µL Final    Sodium 08/21/2018 137  136 - 145 mmol/L Final    Potassium 08/21/2018 4 5  3 5 - 5 3 mmol/L Final    Chloride 08/21/2018 102  100 - 108 mmol/L Final    CO2 08/21/2018 31  21 - 32 mmol/L Final    Anion Gap 08/21/2018 4  4 - 13 mmol/L Final    BUN 08/21/2018 13  5 - 25 mg/dL Final    Creatinine 08/21/2018 0 78  0 60 - 1 30 mg/dL Final    Standardized to IDMS reference method    Glucose, Fasting 08/21/2018 175* 65 - 99 mg/dL Final      Specimen collection should occur prior to Sulfasalazine administration due to the potential for falsely depressed results  Specimen collection should occur prior to Sulfapyridine administration due to the potential for falsely elevated results   Calcium 08/21/2018 8 9  8 3 - 10 1 mg/dL Final    AST 08/21/2018 18  5 - 45 U/L Final      Specimen collection should occur prior to Sulfasalazine administration due to the potential for falsely depressed results   ALT 08/21/2018 30  12 - 78 U/L Final      Specimen collection should occur prior to Sulfasalazine administration due to the potential for falsely depressed results   Alkaline Phosphatase 08/21/2018 143* 46 - 116 U/L Final    Total Protein 08/21/2018 7 7  6 4 - 8 2 g/dL Final    Albumin 08/21/2018 3 4* 3 5 - 5 0 g/dL Final    Total Bilirubin 08/21/2018 0 50  0 20 - 1 00 mg/dL Final    eGFR 08/21/2018 82  ml/min/1 73sq m Final    Hemoglobin A1C 08/21/2018 7 8* 4 2 - 6 3 % Final    EAG 08/21/2018 177  mg/dl Final    Cholesterol 08/21/2018 126  50 - 200 mg/dL Final      Cholesterol:       Desirable         <200 mg/dl       Borderline         200-239 mg/dl       High              >239           Triglycerides 08/21/2018 106  <=150 mg/dL Final      Triglyceride:     Normal          <150 mg/dl     Borderline High 150-199 mg/dl     High            200-499 mg/dl        Very High       >499 mg/dl    Specimen collection should occur prior to N-Acetylcysteine or Metamizole administration due to the potential for falsely depressed results      HDL, Direct 08/21/2018 39* 40 - 60 mg/dL Final      HDL Cholesterol:       High    >60 mg/dL       Low     <41 mg/dL  Specimen collection should occur prior to Metamizole administration due to the potential for falsley depressed results   LDL Calculated 08/21/2018 66  0 - 100 mg/dL Final      LDL Cholesterol:     Optimal           <100 mg/dl     Near Optimal      100-129 mg/dl     Above Optimal       Borderline High 130-159 mg/dl       High            160-189 mg/dl       Very High       >189 mg/dl         This screening LDL is a calculated result  It does not have the accuracy of the Direct Measured LDL in the monitoring of patients with hyperlipidemia and/or statin therapy  Direct Measure LDL (AUF247) must be ordered separately in these patients   Creatinine, Ur 08/21/2018 165 0  mg/dL Final    Microalbum  ,U,Random 08/21/2018 11 5  0 0 - 20 0 mg/L Final    Microalb Creat Ratio 08/21/2018 7  0 - 30 mg/g creatinine Final         Roddy Selby MD

## 2018-08-28 ENCOUNTER — HOSPITAL ENCOUNTER (OUTPATIENT)
Dept: NON INVASIVE DIAGNOSTICS | Facility: HOSPITAL | Age: 63
Discharge: HOME | End: 2018-08-28
Attending: INTERNAL MEDICINE
Payer: COMMERCIAL

## 2018-08-28 DIAGNOSIS — I73.9 CLAUDICATION (HCC): ICD-10-CM

## 2018-08-28 PROCEDURE — 93925 LOWER EXTREMITY STUDY: CPT

## 2018-08-28 PROCEDURE — 93923 UPR/LXTR ART STDY 3+ LVLS: CPT

## 2018-08-28 PROCEDURE — 93925 LOWER EXTREMITY STUDY: CPT | Performed by: INTERNAL MEDICINE

## 2018-08-28 PROCEDURE — 93923 UPR/LXTR ART STDY 3+ LVLS: CPT | Performed by: INTERNAL MEDICINE

## 2018-08-28 RX ORDER — LEVOTHYROXINE SODIUM 0.12 MG/1
125 TABLET ORAL DAILY
Qty: 30 TABLET | Refills: 5 | Status: SHIPPED | OUTPATIENT
Start: 2018-08-28 | End: 2018-10-09 | Stop reason: SDUPTHER

## 2018-08-29 DIAGNOSIS — E11.9 TYPE 2 DIABETES MELLITUS WITHOUT COMPLICATION, WITHOUT LONG-TERM CURRENT USE OF INSULIN (HCC): ICD-10-CM

## 2018-08-29 RX ORDER — INSULIN GLARGINE 100 [IU]/ML
INJECTION, SOLUTION SUBCUTANEOUS
Qty: 15 ML | Refills: 0 | Status: SHIPPED | OUTPATIENT
Start: 2018-08-29 | End: 2018-09-27 | Stop reason: SDUPTHER

## 2018-08-30 ENCOUNTER — HOSPITAL ENCOUNTER (OUTPATIENT)
Dept: ULTRASOUND IMAGING | Facility: CLINIC | Age: 63
Discharge: HOME | End: 2018-08-30
Payer: COMMERCIAL

## 2018-08-30 ENCOUNTER — HOSPITAL ENCOUNTER (OUTPATIENT)
Dept: ULTRASOUND IMAGING | Facility: CLINIC | Age: 63
Discharge: HOME | End: 2018-08-30

## 2018-08-30 ENCOUNTER — HOSPITAL ENCOUNTER (OUTPATIENT)
Dept: MAMMOGRAPHY | Facility: CLINIC | Age: 63
Discharge: HOME | End: 2018-08-30
Payer: COMMERCIAL

## 2018-08-30 DIAGNOSIS — N63.0 BREAST LUMP: ICD-10-CM

## 2018-08-30 DIAGNOSIS — N63.20 LEFT BREAST MASS: ICD-10-CM

## 2018-08-30 PROCEDURE — G0279 TOMOSYNTHESIS, MAMMO: HCPCS

## 2018-08-30 PROCEDURE — 77066 DX MAMMO INCL CAD BI: CPT

## 2018-08-30 PROCEDURE — 76642 ULTRASOUND BREAST LIMITED: CPT

## 2018-08-30 RX ORDER — OMEPRAZOLE 20 MG/1
20 CAPSULE, DELAYED RELEASE ORAL DAILY
COMMUNITY
End: 2020-12-21 | Stop reason: SDUPTHER

## 2018-08-30 NOTE — PROGRESS NOTES
Met with patient and Dr Ebony Hernandez  regarding recommendation for;      __x___ RIGHT ______LEFT      __x_Ultrasound guided  ______Stereotactic  Breast biopsy  __x___Verbalized understanding        Blood thinners:  ___x__yes _____no   Buzz Roles ASA    Date stopped: ____n/a_______    Biopsy teaching sheet given:  ___x____yes ______no    Lenora Feliz states she has hx of IDDM, htn, reflux, hypothyroidism, hysterectomy and right knee replacement

## 2018-09-01 LAB
LEFT EYE DIABETIC RETINOPATHY: NORMAL
RIGHT EYE DIABETIC RETINOPATHY: NORMAL

## 2018-09-01 PROCEDURE — 3072F LOW RISK FOR RETINOPATHY: CPT | Performed by: INTERNAL MEDICINE

## 2018-09-05 ENCOUNTER — HOSPITAL ENCOUNTER (OUTPATIENT)
Dept: ULTRASOUND IMAGING | Facility: CLINIC | Age: 63
Discharge: HOME | End: 2018-09-05
Payer: COMMERCIAL

## 2018-09-05 ENCOUNTER — HOSPITAL ENCOUNTER (OUTPATIENT)
Dept: MAMMOGRAPHY | Facility: CLINIC | Age: 63
Discharge: HOME | End: 2018-09-05

## 2018-09-05 VITALS — DIASTOLIC BLOOD PRESSURE: 70 MMHG | HEART RATE: 72 BPM | SYSTOLIC BLOOD PRESSURE: 150 MMHG

## 2018-09-05 DIAGNOSIS — R92.8 ABNORMAL FINDINGS ON DIAGNOSTIC IMAGING OF BREAST: ICD-10-CM

## 2018-09-05 DIAGNOSIS — Z98.890 STATUS POST BREAST BIOPSY: ICD-10-CM

## 2018-09-05 DIAGNOSIS — R92.8 ABNORMAL MAMMOGRAM: ICD-10-CM

## 2018-09-05 PROCEDURE — 88305 TISSUE EXAM BY PATHOLOGIST: CPT | Performed by: PATHOLOGY

## 2018-09-05 PROCEDURE — 19083 BX BREAST 1ST LESION US IMAG: CPT

## 2018-09-05 RX ORDER — LIDOCAINE HYDROCHLORIDE 10 MG/ML
5 INJECTION, SOLUTION INFILTRATION; PERINEURAL ONCE
Status: COMPLETED | OUTPATIENT
Start: 2018-09-05 | End: 2018-09-05

## 2018-09-05 RX ADMIN — LIDOCAINE HYDROCHLORIDE 5 ML: 10 INJECTION, SOLUTION INFILTRATION; PERINEURAL at 11:24

## 2018-09-05 NOTE — PROGRESS NOTES
Procedure type:    __x___ultrasound guided _____stereotactic    Breast:    _____Left ___x__Right    Location: Right breast 200 6cmFN    Needle: Raymond [id number]    # of passes: 3    Clip: Ribbon    Performed by: Dr Carolina Allen held for 5 minutes by: Dr Samantha Andres Strips:    _x___yes _____no    Band aid:    ____x_yes_____no    Tape and guaze:    _____yes x  no    Tolerated procedure:    ____x_yes _____no

## 2018-09-05 NOTE — PROGRESS NOTES
Ice pack given:    __x___yes _____no    Discharge instructions signed by patient:    __x___yes _____no    Discharge instructions given to patient:    __x___yes _____no    Discharged via:    __x_amulatory    _____wheelchair    _____stretcher    Stable on discharge:    __x___yes ____no

## 2018-09-05 NOTE — PROGRESS NOTES
Patient arrived via:    _x____ambulatory    _____wheelchair    _____stretcher      Domestic violence screen    ___x___negative______positive    Breast Implants:    _______yes ____x____no

## 2018-09-06 ENCOUNTER — TELEPHONE (OUTPATIENT)
Dept: MAMMOGRAPHY | Facility: CLINIC | Age: 63
End: 2018-09-06

## 2018-09-27 DIAGNOSIS — E11.9 TYPE 2 DIABETES MELLITUS WITHOUT COMPLICATION, WITHOUT LONG-TERM CURRENT USE OF INSULIN (HCC): ICD-10-CM

## 2018-09-27 RX ORDER — INSULIN GLARGINE 100 [IU]/ML
INJECTION, SOLUTION SUBCUTANEOUS
Qty: 15 ML | Refills: 0 | Status: SHIPPED | OUTPATIENT
Start: 2018-09-27 | End: 2018-10-09 | Stop reason: SDUPTHER

## 2018-10-09 ENCOUNTER — OFFICE VISIT (OUTPATIENT)
Dept: FAMILY MEDICINE CLINIC | Facility: HOSPITAL | Age: 63
End: 2018-10-09
Payer: COMMERCIAL

## 2018-10-09 VITALS
HEIGHT: 61 IN | WEIGHT: 211.5 LBS | BODY MASS INDEX: 39.93 KG/M2 | HEART RATE: 65 BPM | SYSTOLIC BLOOD PRESSURE: 130 MMHG | DIASTOLIC BLOOD PRESSURE: 70 MMHG

## 2018-10-09 DIAGNOSIS — M48.062 LUMBAR STENOSIS WITH NEUROGENIC CLAUDICATION: ICD-10-CM

## 2018-10-09 DIAGNOSIS — I10 BENIGN ESSENTIAL HYPERTENSION: Primary | ICD-10-CM

## 2018-10-09 DIAGNOSIS — E11.9 TYPE 2 DIABETES MELLITUS WITHOUT COMPLICATION, WITHOUT LONG-TERM CURRENT USE OF INSULIN (HCC): ICD-10-CM

## 2018-10-09 DIAGNOSIS — M17.0 PRIMARY OSTEOARTHRITIS OF BOTH KNEES: ICD-10-CM

## 2018-10-09 DIAGNOSIS — T75.3XXA MOTION SICKNESS, INITIAL ENCOUNTER: ICD-10-CM

## 2018-10-09 DIAGNOSIS — E03.9 ACQUIRED HYPOTHYROIDISM: ICD-10-CM

## 2018-10-09 PROCEDURE — 3075F SYST BP GE 130 - 139MM HG: CPT | Performed by: INTERNAL MEDICINE

## 2018-10-09 PROCEDURE — 99214 OFFICE O/P EST MOD 30 MIN: CPT | Performed by: INTERNAL MEDICINE

## 2018-10-09 PROCEDURE — 3078F DIAST BP <80 MM HG: CPT | Performed by: INTERNAL MEDICINE

## 2018-10-09 RX ORDER — MECLIZINE HCL 12.5 MG/1
12.5 TABLET ORAL EVERY 8 HOURS PRN
Qty: 20 TABLET | Refills: 0 | Status: SHIPPED | OUTPATIENT
Start: 2018-10-09 | End: 2022-02-11

## 2018-10-09 RX ORDER — SIMVASTATIN 20 MG
20 TABLET ORAL DAILY
Qty: 30 TABLET | Refills: 5 | Status: SHIPPED | OUTPATIENT
Start: 2018-10-09 | End: 2018-10-16 | Stop reason: SDUPTHER

## 2018-10-09 RX ORDER — VALSARTAN AND HYDROCHLOROTHIAZIDE 160; 25 MG/1; MG/1
1 TABLET ORAL DAILY
Qty: 30 TABLET | Refills: 5 | Status: SHIPPED | OUTPATIENT
Start: 2018-10-09 | End: 2018-10-16 | Stop reason: SDUPTHER

## 2018-10-09 RX ORDER — LEVOTHYROXINE SODIUM 0.12 MG/1
125 TABLET ORAL DAILY
Qty: 30 TABLET | Refills: 5 | Status: SHIPPED | OUTPATIENT
Start: 2018-10-09 | End: 2018-10-16 | Stop reason: SDUPTHER

## 2018-10-09 RX ORDER — METOPROLOL TARTRATE 50 MG/1
50 TABLET, FILM COATED ORAL EVERY 12 HOURS SCHEDULED
Qty: 60 TABLET | Refills: 5 | Status: SHIPPED | OUTPATIENT
Start: 2018-10-09 | End: 2019-03-19 | Stop reason: SDUPTHER

## 2018-10-09 RX ORDER — INSULIN GLARGINE 100 [IU]/ML
INJECTION, SOLUTION SUBCUTANEOUS
Qty: 5 PEN | Refills: 5 | Status: SHIPPED | OUTPATIENT
Start: 2018-10-09 | End: 2019-11-14 | Stop reason: SDUPTHER

## 2018-10-09 RX ORDER — DICLOFENAC POTASSIUM 50 MG/1
50 TABLET, FILM COATED ORAL 3 TIMES DAILY
Qty: 90 TABLET | Refills: 5 | Status: SHIPPED | OUTPATIENT
Start: 2018-10-09 | End: 2019-06-04 | Stop reason: SDUPTHER

## 2018-10-09 RX ORDER — TRAMADOL HYDROCHLORIDE 50 MG/1
50 TABLET ORAL 3 TIMES DAILY PRN
Qty: 90 TABLET | Refills: 0 | Status: SHIPPED | OUTPATIENT
Start: 2018-10-09 | End: 2020-01-31 | Stop reason: SDUPTHER

## 2018-10-09 NOTE — PROGRESS NOTES
Assessment/Plan:       Diagnoses and all orders for this visit:    Benign essential hypertension  -     metoprolol tartrate (LOPRESSOR) 50 mg tablet; Take 1 tablet (50 mg total) by mouth every 12 (twelve) hours  -     valsartan-hydrochlorothiazide (DIOVAN-HCT) 160-25 MG per tablet; Take 1 tablet by mouth daily    Type 2 diabetes mellitus without complication, without long-term current use of insulin ([facility])  -     insulin aspart (NOVOLOG FLEXPEN) 100 Units/mL injection pen; Inject 20 Units under the skin 3 (three) times a day with meals Before meals or sliding  -     LANTUS SOLOSTAR 100 units/mL injection pen; 36 units twice daily  -     simvastatin (ZOCOR) 20 mg tablet; Take 1 tablet (20 mg total) by mouth daily  -     metFORMIN (GLUCOPHAGE) 500 mg tablet; Take 1 tablet (500 mg total) by mouth 2 (two) times a day with meals  -     Hemoglobin A1C; Future    Acquired hypothyroidism  -     levothyroxine 125 mcg tablet; Take 1 tablet (125 mcg total) by mouth daily  -     TSH, 3rd generation; Future    Primary osteoarthritis of both knees  -     diclofenac potassium (CATAFLAM) 50 mg tablet; Take 1 tablet (50 mg total) by mouth 3 (three) times a day  -     traMADol (ULTRAM) 50 mg tablet; Take 1 tablet (50 mg total) by mouth 3 (three) times a day as needed for moderate pain    Motion sickness, initial encounter  -     meclizine (ANTIVERT) 12 5 MG tablet; Take 1 tablet (12 5 mg total) by mouth every 8 (eight) hours as needed for dizziness or nausea Take TID for three days then prn    Lumbar stenosis with neurogenic claudication  -     Ambulatory referral to Physical Therapy; Future          All of the above diagnoses have been assessed  Additional COMMENTS/PLAN: will add metformin    Will see back in 6 weeks with attn to LS spine sxs with PT  ALso repeat glyco and TSH      Subjective:      Patient ID: Salina Aguilar is a 66 y o  female  HPI      DM-glu reviewed-starts AM out higher than it should be      Knee pain-needs R TKR    Hypertension  Patient is here for follow-up of elevated blood pressure  She is  adherent to a low-salt diet  Patient denies headache, dizziness or lightheadedness Cardiovascular risk factors: diabetes mellitus and dyslipidemia  History of target organ damage: none  Hyperlipidemia- The patient is compliant with diet and taking meds  The patient understands the efficacy of the treatment in vascular prevention  The following portions of the patient's history were revised and updated as appropriate: Problem list, allergies, med list, FH, SH, Past medical and surgical histories      Current Outpatient Prescriptions   Medication Sig Dispense Refill    amoxicillin (AMOXIL) 500 mg capsule Take 4 capsules by mouth      aspirin 81 MG tablet Take 1 tablet by mouth daily      diclofenac potassium (CATAFLAM) 50 mg tablet Take 1 tablet (50 mg total) by mouth 3 (three) times a day 90 tablet 5    glucose blood (FREESTYLE TEST STRIPS) test strip Testing 4 times daily - Dx- E11 9 100 each 5    glucose monitoring kit (FREESTYLE) monitoring kit Testing 4 times daily - DX- E11 9 1 each 0    insulin aspart (NOVOLOG FLEXPEN) 100 Units/mL injection pen Inject 20 Units under the skin 3 (three) times a day with meals Before meals or sliding 10 pen 5    Insulin Syringe-Needle U-100 (B-D INS SYR ULTRAFINE  3CC/31G) 31G X 5/16" 0 3 ML MISC by Does not apply route      LANTUS SOLOSTAR 100 units/mL injection pen 36 units twice daily 5 pen 5    levothyroxine 125 mcg tablet Take 1 tablet (125 mcg total) by mouth daily 30 tablet 5    metoprolol tartrate (LOPRESSOR) 50 mg tablet Take 1 tablet (50 mg total) by mouth every 12 (twelve) hours 60 tablet 5    omeprazole (PriLOSEC) 20 mg delayed release capsule Take 20 mg by mouth daily      simvastatin (ZOCOR) 20 mg tablet Take 1 tablet (20 mg total) by mouth daily 30 tablet 5    traMADol (ULTRAM) 50 mg tablet Take 1 tablet (50 mg total) by mouth 3 (three) times a day as needed for moderate pain 90 tablet 0    valsartan-hydrochlorothiazide (DIOVAN-HCT) 160-25 MG per tablet Take 1 tablet by mouth daily 30 tablet 5    meclizine (ANTIVERT) 12 5 MG tablet Take 1 tablet (12 5 mg total) by mouth every 8 (eight) hours as needed for dizziness or nausea Take TID for three days then prn 20 tablet 0    metFORMIN (GLUCOPHAGE) 500 mg tablet Take 1 tablet (500 mg total) by mouth 2 (two) times a day with meals 180 tablet 3     No current facility-administered medications for this visit  Review of Systems   Constitutional: Negative  Respiratory: Negative  Cardiovascular: Negative  Gastrointestinal: Negative  Genitourinary: Negative  Musculoskeletal:        Pain in the back of legs with ambulation         Objective:    /70 (BP Location: Left arm, Patient Position: Sitting, Cuff Size: Large)   Pulse 65   Ht 5' 1" (1 549 m)   Wt 95 9 kg (211 lb 8 oz)   BMI 39 96 kg/m²      Physical Exam   Constitutional: She appears well-developed and well-nourished  Cardiovascular: Normal rate and regular rhythm  Pulmonary/Chest: Effort normal and breath sounds normal    Musculoskeletal: She exhibits no edema  Straight leg raise is negative  There is some point tenderness in the posterior calf area  Osteoarthritic changes of the right knee are noted  Vitals reviewed  No visits with results within 2 Week(s) from this visit     Latest known visit with results is:   Hospital Outpatient Visit on 09/05/2018   Component Date Value Ref Range Status    Case Report 09/05/2018    Final                    Value:Surgical Pathology Report                         Case: [id number]                                   Authorizing Provider:  Salina Sanchez DO               Collected:           09/05/2018 1130              Ordering Location:     [address] Breast Received:            09/05/2018 [address] Pathologist:           Sheila Gregg MD                                                         Specimen:    Breast, Right, Ultrasound biopsy right breast 200 6cmfn 3 passes 12g marchacee               Final Diagnosis 09/05/2018    Final                    Value: This result contains rich text formatting which cannot be displayed here   Additional Information 09/05/2018    Final                    Value: This result contains rich text formatting which cannot be displayed here  Maribel Sal Description 09/05/2018    Final                    Value: This result contains rich text formatting which cannot be displayed here      Clinical Information 09/05/2018    Final                    Value:Fixed in formalin         Manuel Zapata MD

## 2018-10-12 ENCOUNTER — EVALUATION (OUTPATIENT)
Dept: PHYSICAL THERAPY | Facility: CLINIC | Age: 63
End: 2018-10-12
Payer: COMMERCIAL

## 2018-10-12 DIAGNOSIS — M48.062 LUMBAR STENOSIS WITH NEUROGENIC CLAUDICATION: Primary | ICD-10-CM

## 2018-10-12 PROCEDURE — G8992 OTHER PT/OT  D/C STATUS: HCPCS | Performed by: PHYSICAL THERAPIST

## 2018-10-12 PROCEDURE — G8991 OTHER PT/OT GOAL STATUS: HCPCS | Performed by: PHYSICAL THERAPIST

## 2018-10-12 PROCEDURE — 97162 PT EVAL MOD COMPLEX 30 MIN: CPT | Performed by: PHYSICAL THERAPIST

## 2018-10-12 PROCEDURE — G8990 OTHER PT/OT CURRENT STATUS: HCPCS | Performed by: PHYSICAL THERAPIST

## 2018-10-12 NOTE — PROGRESS NOTES
PT Evaluation/Discharge  Addendum 18: Pt has been non-compliant with PT, not coming for therapy the past 3 weeks, nor has she made any appointments  She is being discharged at this time  Today's date: 10/12/2018  Patient name: Mandeep Price  : 1955  MRN: [id number]  Referring provider: Hannah Morales MD  Dx:   Encounter Diagnosis     ICD-10-CM    1  Lumbar stenosis with neurogenic claudication M48 [id number] Ambulatory referral to Physical Therapy                  Assessment  Impairments: abnormal movement, activity intolerance, lacks appropriate home exercise program, pain with function and weight-bearing intolerance    Assessment details: Pt is a 66year old female presenting to outpatient Physical Therapy with primary complaints of bilateral leg pain  Pt presents with decreased walking endurance, decreased weight bearing activity tolerance and overall decreased functional mobility  Additionally, pt symptoms were exacerbated with treadmill walking on an incline, showing signs consistent with neurogenic intermittent claudication  These physical deficits are preventing the patient from participating in usual activities such as standing for prolonged periods of time and going to walks for exercise  Pt would benefit from skilled PT services in order to address these deficits and reach maximum level of function  Thank you kindly for the referral!  Barriers to therapy: None  Understanding of Dx/Px/POC: good   Prognosis: good    Goals  ST  The patient will be fully compliant with HEP within 2 weeks  2 Pt will report decrease in pain by at least two points on VAS within four weeks  3 Pt will report increase in standing tolerance to at least ten minutes without pain in order to progress to standing to wash dishes within four weeks  LT  The patient will increase FOTO score to 57 within ten weeks  2  The patient will report full return to taking walks for exericse indicating return to functional baseline within ten weeks  Plan  Patient would benefit from: skilled physical therapy  Planned modality interventions: thermotherapy: hydrocollator packs, cryotherapy, traction, ultrasound and TENS  Planned therapy interventions: therapeutic activities, therapeutic exercise, home exercise program, manual therapy, joint mobilization, balance, patient education, neuromuscular re-education, strengthening and stretching  Frequency: 2x week  Duration in weeks: 10  Treatment plan discussed with: patient        Subjective Evaluation    History of Present Illness  Date of onset: 10/12/2013  Mechanism of injury: Pt reports that she has been experiencing high levels of pain in her legs, preventing her from walking  She notes that even walking one block brings on a lot of pain  She notes that her right knee also contributes to her difficulties, as she has arthritis  Areas of pain include B hips, knees and hips  Recurrent probem    Pain  Current pain ratin  At best pain ratin  At worst pain rating: 10  Quality: radiating (numbness)  Alleviating factors: Sitting  Aggravating factors: walking, standing and stair climbing  Progression: worsening    Social Support  Steps to enter house: no  Stairs in house: no     Employment status: not working  Treatments  Previous treatment: injection treatment  Patient Goals  Patient goals for therapy: decreased pain and return to sport/leisure activities  Patient goal: Going on walks for exercise every day        Objective     Special Questions  Negative for bladder dysfunction, bowel dysfunction and saddle (S4) numbness    Static Posture     Lumbar Spine   Increased lordosis  Palpation   Left   Tenderness of the adductor brevis, adductor longus and adductor mere  Right   No palpable tenderness to the adductor brevis  Additional Palpation Details  Pt L medial gastroc is TTP    Tenderness     Lumbar Spine  No tenderness in the spinous process       Neurological Testing     Sensation     Lumbar   Left   Intact: light touch    Right   Intact: light touch    Reflexes   Left   Patellar (L4): absent (0)  Achilles (S1): trace (1+)    Right   Patellar (L4): absent (0)  Achilles (S1): trace (1+)    Active Range of Motion     Lumbar   Flexion: WFL  Extension: WFL  Left lateral flexion: WFL  Right lateral flexion: WFL  Left rotation: WFL  Right rotation: [facility]    Strength/Myotome Testing     Left Hip   Planes of Motion   Flexion: 5  Extension: 5  Abduction: 5  Adduction: 5  External rotation: 5  Internal rotation: 5    Right Hip   Planes of Motion   Flexion: 5  Extension: 5  Abduction: 5  Adduction: 5  External rotation: 5  Internal rotation: 5    Left Knee   Flexion: 5  Extension: 5    Right Knee   Flexion: 5  Extension: 5    Left Ankle/Foot   Dorsiflexion: 5    Right Ankle/Foot   Dorsiflexion: 5    Tests       Thoracic   Negative slump  Lumbar   Negative repeated flexion, repeated extension and slump  Left   Positive passive SLR  Right   Negative passive SLR  Additional Tests Details  Incline test: pt reported an increase in symptoms when treadmill level was increased during walking, demonstrating signs and symptoms consistent with Neurogenic Intermittent Claudication      Ambulation     Observational Gait   Gait: antalgic       Flowsheet Rows      Most Recent Value   PT/OT G-Codes   Current Score  44   Projected Score  57          Precautions: Type II Diabetes    Daily Treatment Diary     Manual                                                                                   Exercise Diary              Stationary Bike             Treadmill (when ready)             LTR             SKC             Bridges             Seated Marches             Supine Marches             Prone knee Lift             Clamshells             SLR: flexion             Dying bug Modalities              MHP             TENS

## 2018-10-16 DIAGNOSIS — I10 BENIGN ESSENTIAL HYPERTENSION: ICD-10-CM

## 2018-10-16 DIAGNOSIS — E11.9 TYPE 2 DIABETES MELLITUS WITHOUT COMPLICATION, WITHOUT LONG-TERM CURRENT USE OF INSULIN (HCC): ICD-10-CM

## 2018-10-16 DIAGNOSIS — E03.9 ACQUIRED HYPOTHYROIDISM: ICD-10-CM

## 2018-10-16 RX ORDER — LEVOTHYROXINE SODIUM 0.12 MG/1
125 TABLET ORAL DAILY
Qty: 30 TABLET | Refills: 11 | Status: SHIPPED | OUTPATIENT
Start: 2018-10-16 | End: 2019-10-19 | Stop reason: SDUPTHER

## 2018-10-16 RX ORDER — VALSARTAN AND HYDROCHLOROTHIAZIDE 160; 25 MG/1; MG/1
TABLET ORAL
Qty: 30 TABLET | Refills: 11 | Status: SHIPPED | OUTPATIENT
Start: 2018-10-16 | End: 2019-06-04 | Stop reason: SDUPTHER

## 2018-10-16 RX ORDER — SIMVASTATIN 20 MG
TABLET ORAL
Qty: 30 TABLET | Refills: 11 | Status: SHIPPED | OUTPATIENT
Start: 2018-10-16 | End: 2020-04-26

## 2018-10-17 ENCOUNTER — APPOINTMENT (OUTPATIENT)
Dept: PHYSICAL THERAPY | Facility: CLINIC | Age: 63
End: 2018-10-17
Payer: COMMERCIAL

## 2018-10-19 ENCOUNTER — APPOINTMENT (OUTPATIENT)
Dept: PHYSICAL THERAPY | Facility: CLINIC | Age: 63
End: 2018-10-19
Payer: COMMERCIAL

## 2018-10-22 ENCOUNTER — APPOINTMENT (OUTPATIENT)
Dept: PHYSICAL THERAPY | Facility: CLINIC | Age: 63
End: 2018-10-22
Payer: COMMERCIAL

## 2018-10-26 ENCOUNTER — APPOINTMENT (OUTPATIENT)
Dept: PHYSICAL THERAPY | Facility: CLINIC | Age: 63
End: 2018-10-26
Payer: COMMERCIAL

## 2018-10-26 ENCOUNTER — OFFICE VISIT (OUTPATIENT)
Dept: FAMILY MEDICINE CLINIC | Facility: HOSPITAL | Age: 63
End: 2018-10-26
Payer: COMMERCIAL

## 2018-10-26 VITALS
SYSTOLIC BLOOD PRESSURE: 152 MMHG | HEIGHT: 61 IN | DIASTOLIC BLOOD PRESSURE: 78 MMHG | TEMPERATURE: 98.3 F | HEART RATE: 71 BPM | OXYGEN SATURATION: 97 % | WEIGHT: 212 LBS | BODY MASS INDEX: 40.02 KG/M2

## 2018-10-26 DIAGNOSIS — J32.9 SINUSITIS, UNSPECIFIED CHRONICITY, UNSPECIFIED LOCATION: Primary | ICD-10-CM

## 2018-10-26 PROCEDURE — 99213 OFFICE O/P EST LOW 20 MIN: CPT | Performed by: PHYSICIAN ASSISTANT

## 2018-10-26 PROCEDURE — 3008F BODY MASS INDEX DOCD: CPT | Performed by: PHYSICIAN ASSISTANT

## 2018-10-26 RX ORDER — AMOXICILLIN AND CLAVULANATE POTASSIUM 500; 125 MG/1; MG/1
1 TABLET, FILM COATED ORAL
Qty: 30 TABLET | Refills: 0 | Status: SHIPPED | OUTPATIENT
Start: 2018-10-26 | End: 2018-11-05

## 2018-10-26 NOTE — PROGRESS NOTES
Assessment/Plan:         Diagnoses and all orders for this visit:    Sinusitis, unspecified chronicity, unspecified location  -     amoxicillin-clavulanate (AUGMENTIN) 500-125 mg per tablet; Take 1 tablet by mouth 3 (three) times a day with meals for 10 days        Subjective:      Patient ID: Ryan Colunga is a 66 y o  female  66year old white female c/o cold and congestion sx  Past few weeks, has a cough  Productive of green phlegm;  and ear pain  Also runny nose, and headaches  Very tired  URI    Associated symptoms include congestion, coughing, ear pain, headaches, rhinorrhea and a sore throat  Review of Systems   Constitutional: Positive for fatigue  Negative for chills, diaphoresis and fever  HENT: Positive for congestion, ear pain, rhinorrhea, sore throat and voice change  Respiratory: Positive for cough and shortness of breath  Neurological: Positive for headaches  Objective:      /78   Pulse 71   Temp 98 3 °F (36 8 °C) (Tympanic)   Ht 5' 1" (1 549 m)   Wt 96 2 kg (212 lb)   LMP  (LMP Unknown)   SpO2 97%   BMI 40 06 kg/m²          Physical Exam   Constitutional: She is oriented to person, place, and time  She appears well-developed and well-nourished  No distress  Overweight  HENT:   Head: Normocephalic and atraumatic  Mouth/Throat: Oropharynx is clear and moist  No oropharyngeal exudate  TM hazy and erythematous  Turbinates inflamed  Eyes: Conjunctivae and EOM are normal  Right eye exhibits no discharge  Left eye exhibits no discharge  No scleral icterus  Pulmonary/Chest: Effort normal and breath sounds normal  No respiratory distress  She has no wheezes  She has no rales  Musculoskeletal: Normal range of motion  She exhibits no edema  Neurological: She is alert and oriented to person, place, and time  Skin: She is not diaphoretic

## 2019-02-11 DIAGNOSIS — E11.9 TYPE 2 DIABETES MELLITUS WITHOUT COMPLICATION, WITHOUT LONG-TERM CURRENT USE OF INSULIN (HCC): ICD-10-CM

## 2019-02-11 RX ORDER — INSULIN GLARGINE 100 [IU]/ML
INJECTION, SOLUTION SUBCUTANEOUS
Qty: 5 PEN | Refills: 5 | Status: SHIPPED | OUTPATIENT
Start: 2019-02-11 | End: 2019-06-04 | Stop reason: SDUPTHER

## 2019-02-11 NOTE — TELEPHONE ENCOUNTER
Diclofenac is not available at any of the pharmacies  ETA is more than a month  Pharmacist suggesting that she be switched to something else for now  Please advise

## 2019-02-26 ENCOUNTER — TELEPHONE (OUTPATIENT)
Dept: FAMILY MEDICINE CLINIC | Facility: HOSPITAL | Age: 64
End: 2019-02-26

## 2019-02-26 DIAGNOSIS — M17.0 ARTHRITIS OF BOTH KNEES: Primary | ICD-10-CM

## 2019-02-26 NOTE — TELEPHONE ENCOUNTER
Called PT - she's ok with what ever you want her on - Just an FYI - other doctors were switching their PT's to Diclofenac Sodium which is in stock - what would you like to do?

## 2019-03-19 DIAGNOSIS — I10 BENIGN ESSENTIAL HYPERTENSION: ICD-10-CM

## 2019-03-19 RX ORDER — METOPROLOL TARTRATE 50 MG/1
TABLET, FILM COATED ORAL
Qty: 60 TABLET | Refills: 11 | Status: SHIPPED | OUTPATIENT
Start: 2019-03-19 | End: 2020-04-26

## 2019-04-20 DIAGNOSIS — E11.9 TYPE 2 DIABETES MELLITUS WITHOUT COMPLICATION, WITHOUT LONG-TERM CURRENT USE OF INSULIN (HCC): ICD-10-CM

## 2019-04-20 DIAGNOSIS — I10 BENIGN ESSENTIAL HYPERTENSION: ICD-10-CM

## 2019-04-21 RX ORDER — SIMVASTATIN 20 MG
TABLET ORAL
Qty: 30 TABLET | Refills: 5 | Status: SHIPPED | OUTPATIENT
Start: 2019-04-21 | End: 2019-06-04 | Stop reason: SDUPTHER

## 2019-04-21 RX ORDER — VALSARTAN AND HYDROCHLOROTHIAZIDE 160; 25 MG/1; MG/1
TABLET ORAL
Qty: 30 TABLET | Refills: 5 | Status: SHIPPED | OUTPATIENT
Start: 2019-04-21 | End: 2019-10-19 | Stop reason: SDUPTHER

## 2019-05-18 DIAGNOSIS — Z79.4 TYPE 2 DIABETES MELLITUS WITH COMPLICATION, WITH LONG-TERM CURRENT USE OF INSULIN (HCC): ICD-10-CM

## 2019-05-18 DIAGNOSIS — E11.8 TYPE 2 DIABETES MELLITUS WITH COMPLICATION, WITH LONG-TERM CURRENT USE OF INSULIN (HCC): ICD-10-CM

## 2019-06-04 ENCOUNTER — TELEPHONE (OUTPATIENT)
Dept: FAMILY MEDICINE CLINIC | Facility: HOSPITAL | Age: 64
End: 2019-06-04

## 2019-06-04 DIAGNOSIS — N39.0 URINARY TRACT INFECTION WITHOUT HEMATURIA, SITE UNSPECIFIED: Primary | ICD-10-CM

## 2019-06-04 RX ORDER — NITROFURANTOIN 25; 75 MG/1; MG/1
100 CAPSULE ORAL 2 TIMES DAILY
Qty: 14 CAPSULE | Refills: 0 | Status: SHIPPED | OUTPATIENT
Start: 2019-06-04 | End: 2019-06-11

## 2019-06-30 ENCOUNTER — HOSPITAL ENCOUNTER (EMERGENCY)
Facility: HOSPITAL | Age: 64
Discharge: HOME | End: 2019-06-30
Attending: EMERGENCY MEDICINE | Admitting: EMERGENCY MEDICINE
Payer: COMMERCIAL

## 2019-06-30 VITALS
HEART RATE: 88 BPM | OXYGEN SATURATION: 97 % | WEIGHT: 212.08 LBS | DIASTOLIC BLOOD PRESSURE: 58 MMHG | SYSTOLIC BLOOD PRESSURE: 125 MMHG | TEMPERATURE: 97.1 F | RESPIRATION RATE: 24 BRPM | BODY MASS INDEX: 40.07 KG/M2

## 2019-06-30 DIAGNOSIS — N39.0 UTI (URINARY TRACT INFECTION): ICD-10-CM

## 2019-06-30 DIAGNOSIS — L03.312 CELLULITIS OF BACK: Primary | ICD-10-CM

## 2019-06-30 LAB
ALBUMIN SERPL BCP-MCNC: 3.3 G/DL (ref 3.5–5)
ALP SERPL-CCNC: 121 U/L (ref 46–116)
ALT SERPL W P-5'-P-CCNC: 19 U/L (ref 12–78)
ANION GAP SERPL CALCULATED.3IONS-SCNC: 7 MMOL/L (ref 4–13)
AST SERPL W P-5'-P-CCNC: 10 U/L (ref 5–45)
BACTERIA UR QL AUTO: ABNORMAL /HPF
BASOPHILS # BLD AUTO: 0.09 THOUSANDS/ΜL (ref 0–0.1)
BASOPHILS NFR BLD AUTO: 0 % (ref 0–1)
BILIRUB SERPL-MCNC: 1.5 MG/DL (ref 0.2–1)
BILIRUB UR QL STRIP: ABNORMAL
BUN SERPL-MCNC: 13 MG/DL (ref 5–25)
CALCIUM SERPL-MCNC: 9.3 MG/DL (ref 8.3–10.1)
CHLORIDE SERPL-SCNC: 97 MMOL/L (ref 100–108)
CLARITY UR: ABNORMAL
CO2 SERPL-SCNC: 30 MMOL/L (ref 21–32)
COLOR UR: ABNORMAL
CREAT SERPL-MCNC: 1.03 MG/DL (ref 0.6–1.3)
EOSINOPHIL # BLD AUTO: 0.06 THOUSAND/ΜL (ref 0–0.61)
EOSINOPHIL NFR BLD AUTO: 0 % (ref 0–6)
ERYTHROCYTE [DISTWIDTH] IN BLOOD BY AUTOMATED COUNT: 12.9 % (ref 11.6–15.1)
GFR SERPL CREATININE-BSD FRML MDRD: 58 ML/MIN/1.73SQ M
GLUCOSE SERPL-MCNC: 215 MG/DL (ref 65–140)
GLUCOSE UR STRIP-MCNC: NEGATIVE MG/DL
HCT VFR BLD AUTO: 39.1 % (ref 34.8–46.1)
HGB BLD-MCNC: 12.9 G/DL (ref 11.5–15.4)
HGB UR QL STRIP.AUTO: ABNORMAL
IMM GRANULOCYTES # BLD AUTO: 0.26 THOUSAND/UL (ref 0–0.2)
IMM GRANULOCYTES NFR BLD AUTO: 1 % (ref 0–2)
KETONES UR STRIP-MCNC: NEGATIVE MG/DL
LEUKOCYTE ESTERASE UR QL STRIP: ABNORMAL
LYMPHOCYTES # BLD AUTO: 3.98 THOUSANDS/ΜL (ref 0.6–4.47)
LYMPHOCYTES NFR BLD AUTO: 14 % (ref 14–44)
MCH RBC QN AUTO: 28.4 PG (ref 26.8–34.3)
MCHC RBC AUTO-ENTMCNC: 33 G/DL (ref 31.4–37.4)
MCV RBC AUTO: 86 FL (ref 82–98)
MONOCYTES # BLD AUTO: 2.33 THOUSAND/ΜL (ref 0.17–1.22)
MONOCYTES NFR BLD AUTO: 8 % (ref 4–12)
NEUTROPHILS # BLD AUTO: 21.95 THOUSANDS/ΜL (ref 1.85–7.62)
NEUTS SEG NFR BLD AUTO: 77 % (ref 43–75)
NITRITE UR QL STRIP: NEGATIVE
NON-SQ EPI CELLS URNS QL MICRO: ABNORMAL /HPF
NRBC BLD AUTO-RTO: 0 /100 WBCS
OTHER STN SPEC: ABNORMAL
PH UR STRIP.AUTO: 5.5 [PH]
PLATELET # BLD AUTO: 226 THOUSANDS/UL (ref 149–390)
PMV BLD AUTO: 10.7 FL (ref 8.9–12.7)
POTASSIUM SERPL-SCNC: 3.6 MMOL/L (ref 3.5–5.3)
PROT SERPL-MCNC: 7.9 G/DL (ref 6.4–8.2)
PROT UR STRIP-MCNC: NEGATIVE MG/DL
RBC # BLD AUTO: 4.55 MILLION/UL (ref 3.81–5.12)
RBC #/AREA URNS AUTO: ABNORMAL /HPF
SODIUM SERPL-SCNC: 134 MMOL/L (ref 136–145)
SP GR UR STRIP.AUTO: 1.02 (ref 1–1.03)
UROBILINOGEN UR QL STRIP.AUTO: 1 E.U./DL
WBC # BLD AUTO: 28.67 THOUSAND/UL (ref 4.31–10.16)
WBC #/AREA URNS AUTO: ABNORMAL /HPF

## 2019-06-30 PROCEDURE — 80053 COMPREHEN METABOLIC PANEL: CPT | Performed by: PHYSICIAN ASSISTANT

## 2019-06-30 PROCEDURE — 87070 CULTURE OTHR SPECIMN AEROBIC: CPT | Performed by: PHYSICIAN ASSISTANT

## 2019-06-30 PROCEDURE — 81001 URINALYSIS AUTO W/SCOPE: CPT | Performed by: PHYSICIAN ASSISTANT

## 2019-06-30 PROCEDURE — 36415 COLL VENOUS BLD VENIPUNCTURE: CPT | Performed by: PHYSICIAN ASSISTANT

## 2019-06-30 PROCEDURE — 87186 SC STD MICRODIL/AGAR DIL: CPT | Performed by: PHYSICIAN ASSISTANT

## 2019-06-30 PROCEDURE — 85025 COMPLETE CBC W/AUTO DIFF WBC: CPT | Performed by: PHYSICIAN ASSISTANT

## 2019-06-30 PROCEDURE — 99283 EMERGENCY DEPT VISIT LOW MDM: CPT

## 2019-06-30 PROCEDURE — 10060 I&D ABSCESS SIMPLE/SINGLE: CPT | Performed by: PHYSICIAN ASSISTANT

## 2019-06-30 PROCEDURE — 99284 EMERGENCY DEPT VISIT MOD MDM: CPT | Performed by: PHYSICIAN ASSISTANT

## 2019-06-30 PROCEDURE — 87077 CULTURE AEROBIC IDENTIFY: CPT | Performed by: PHYSICIAN ASSISTANT

## 2019-06-30 PROCEDURE — 87147 CULTURE TYPE IMMUNOLOGIC: CPT | Performed by: PHYSICIAN ASSISTANT

## 2019-06-30 PROCEDURE — 87086 URINE CULTURE/COLONY COUNT: CPT | Performed by: PHYSICIAN ASSISTANT

## 2019-06-30 PROCEDURE — 96365 THER/PROPH/DIAG IV INF INIT: CPT

## 2019-06-30 PROCEDURE — 87205 SMEAR GRAM STAIN: CPT | Performed by: PHYSICIAN ASSISTANT

## 2019-06-30 RX ORDER — CLINDAMYCIN PHOSPHATE 600 MG/50ML
600 INJECTION INTRAVENOUS ONCE
Status: COMPLETED | OUTPATIENT
Start: 2019-06-30 | End: 2019-06-30

## 2019-06-30 RX ORDER — LIDOCAINE HYDROCHLORIDE 10 MG/ML
5 INJECTION, SOLUTION EPIDURAL; INFILTRATION; INTRACAUDAL; PERINEURAL ONCE
Status: COMPLETED | OUTPATIENT
Start: 2019-06-30 | End: 2019-06-30

## 2019-06-30 RX ORDER — NITROFURANTOIN 25; 75 MG/1; MG/1
100 CAPSULE ORAL ONCE
Status: COMPLETED | OUTPATIENT
Start: 2019-06-30 | End: 2019-06-30

## 2019-06-30 RX ORDER — CLINDAMYCIN HYDROCHLORIDE 300 MG/1
300 CAPSULE ORAL EVERY 6 HOURS
Qty: 28 CAPSULE | Refills: 0 | Status: SHIPPED | OUTPATIENT
Start: 2019-06-30 | End: 2019-07-07

## 2019-06-30 RX ORDER — NITROFURANTOIN 25; 75 MG/1; MG/1
100 CAPSULE ORAL 2 TIMES DAILY
Qty: 10 CAPSULE | Refills: 0 | Status: SHIPPED | OUTPATIENT
Start: 2019-06-30 | End: 2019-07-05

## 2019-06-30 RX ADMIN — NITROFURANTOIN (MONOHYDRATE/MACROCRYSTALS) 100 MG: 75; 25 CAPSULE ORAL at 14:38

## 2019-06-30 RX ADMIN — LIDOCAINE HYDROCHLORIDE 5 ML: 10 INJECTION, SOLUTION EPIDURAL; INFILTRATION; INTRACAUDAL; PERINEURAL at 12:35

## 2019-06-30 RX ADMIN — CLINDAMYCIN IN 5 PERCENT DEXTROSE 600 MG: 12 INJECTION, SOLUTION INTRAVENOUS at 14:38

## 2019-06-30 NOTE — ED PROVIDER NOTES
History  Chief Complaint   Patient presents with    Urinary Incontinence     To ED with c/o urinary frequency and incontinence fever and chills x 2 days  States that she also has an abscess on her back   Abscess     Patient is a 59 y/o F with h/o DM that presents to the ED with fever that started last night of 101 4  She states she had urinary frequency last night and incontinence of urine that started last night  She denies dysuria  Patient states she has had a black head on her back for a while, but over the past week it has gotten painful and red and larger  History provided by:  Patient  Abscess   Location:  Torso  Torso abscess location:  Upper back  Abscess quality: induration, painful and redness    Red streaking: no    Duration:  1 week  Progression:  Worsening  Pain details:     Quality:  Aching    Severity:  Moderate    Timing:  Constant    Progression:  Unchanged  Chronicity:  New  Context: diabetes    Relieved by:  Nothing  Worsened by:  Nothing  Ineffective treatments:  None tried  Associated symptoms: fever    Associated symptoms: no vomiting    Urinary Frequency   Severity:  Moderate  Onset quality:  Sudden  Duration:  1 day  Timing:  Constant  Progression:  Unchanged  Chronicity:  New  Context:  Patient started with urinary frequency and incontinence last night  Relieved by:  Nothing  Worsened by:  Nothing  Associated symptoms: fever    Associated symptoms: no sore throat and no vomiting        Prior to Admission Medications   Prescriptions Last Dose Informant Patient Reported? Taking?    Insulin Syringe-Needle U-100 (B-D INS SYR ULTRAFINE  3CC/31G) 31G X 5/16" 0 3 ML MISC  Self Yes No   Sig: by Does not apply route   LANTUS SOLOSTAR 100 units/mL injection pen   No No   Si units twice daily   amoxicillin (AMOXIL) 500 mg capsule  Self Yes No   Sig: Take 4 capsules by mouth   aspirin 81 MG tablet  Self Yes No   Sig: Take 1 tablet by mouth daily   diclofenac sodium (VOLTAREN) 50 mg EC tablet   No No   Sig: Take 1 tablet (50 mg total) by mouth 3 (three) times a day   glucose blood (FREESTYLE LITE) test strip   No No   Sig: TEST FOUR TIMES A DAY   glucose monitoring kit (FREESTYLE) monitoring kit  Self No No   Sig: Testing 4 times daily - DX- E11 9   insulin aspart (NOVOLOG FLEXPEN) 100 Units/mL injection pen   No No   Sig: Inject 20 Units under the skin 3 (three) times a day with meals Before meals or sliding   levothyroxine 125 mcg tablet   No No   Sig: Take 1 tablet (125 mcg total) by mouth daily   meclizine (ANTIVERT) 12 5 MG tablet   No No   Sig: Take 1 tablet (12 5 mg total) by mouth every 8 (eight) hours as needed for dizziness or nausea Take TID for three days then prn   metFORMIN (GLUCOPHAGE) 500 mg tablet   No No   Sig: Take 1 tablet (500 mg total) by mouth 2 (two) times a day with meals   metoprolol tartrate (LOPRESSOR) 50 mg tablet   No No   Sig: TAKE ONE TABLET BY MOUTH TWICE A DAY   omeprazole (PriLOSEC) 20 mg delayed release capsule  Self Yes No   Sig: Take 20 mg by mouth daily   simvastatin (ZOCOR) 20 mg tablet   No No   Sig: TAKE ONE TABLET DAILY AS DIRECTED   traMADol (ULTRAM) 50 mg tablet   No No   Sig: Take 1 tablet (50 mg total) by mouth 3 (three) times a day as needed for moderate pain   valsartan-hydrochlorothiazide (DIOVAN-HCT) 160-25 MG per tablet   No No   Sig: TAKE ONE TABLET BY MOUTH EVERY DAY      Facility-Administered Medications: None       Past Medical History:   Diagnosis Date    History of total left knee replacement (TKR) 2013    Obesity     TMJ (dislocation of temporomandibular joint)     Trigeminal neuralgia        Past Surgical History:   Procedure Laterality Date    CARDIAC CATHETERIZATION      HYSTERECTOMY      TONSILLECTOMY      US GUIDED BREAST BIOPSY RIGHT COMPLETE Right 9/5/2018       Family History   Problem Relation Age of Onset    Colon cancer Mother     Dementia Father     Depression Father     Diabetes Father     Stroke Father    Camacho Parkinsonism Father     GI problems Family         GI malignancy     I have reviewed and agree with the history as documented  Social History     Tobacco Use    Smoking status: Never Smoker    Smokeless tobacco: Never Used    Tobacco comment: As per [employer] former smoker quit over 10 yrs ago   Substance Use Topics    Alcohol use: No    Drug use: No        Review of Systems   Constitutional: Positive for fever  HENT: Negative for sore throat  Gastrointestinal: Negative for vomiting  Genitourinary: Positive for frequency  Skin: Positive for color change  Neurological: Negative for dizziness, weakness and numbness  All other systems reviewed and are negative  Physical Exam  Physical Exam   Constitutional: She appears well-developed and well-nourished  She is cooperative  She does not appear ill  No distress  HENT:   Head: Normocephalic and atraumatic  Mouth/Throat: Oropharynx is clear and moist    Eyes: Conjunctivae are normal    Neck: Normal range of motion  Cardiovascular: Normal rate, regular rhythm and normal heart sounds  No murmur heard  Pulmonary/Chest: Effort normal and breath sounds normal  She has no wheezes  She has no rhonchi  She has no rales  Abdominal: Soft  Normal appearance and bowel sounds are normal  There is no tenderness  Neurological: She is alert  Skin: Skin is warm and dry  No rash noted  She is not diaphoretic  No pallor  Small black head to right upper back with surrounding erythema measuring 4cm  Nursing note and vitals reviewed        Vital Signs  ED Triage Vitals [06/30/19 1203]   Temperature Pulse Respirations Blood Pressure SpO2   (!) 97 1 °F (36 2 °C) 70 20 132/70 100 %      Temp Source Heart Rate Source Patient Position - Orthostatic VS BP Location FiO2 (%)   Tympanic Monitor Sitting Right arm --      Pain Score       5           Vitals:    06/30/19 1203 06/30/19 1353   BP: 132/70 125/58   Pulse: 70 88   Patient Position - Orthostatic VS: Sitting Lying         Visual Acuity      ED Medications  Medications   lidocaine (PF) (XYLOCAINE-MPF) 1 % injection 5 mL (5 mL Infiltration Given by Other 6/30/19 1235)   clindamycin (CLEOCIN) IVPB (premix) 600 mg (0 mg Intravenous Stopped 6/30/19 1547)   nitrofurantoin (MACROBID) extended-release capsule 100 mg (100 mg Oral Given 6/30/19 1438)       Diagnostic Studies  Results Reviewed     Procedure Component Value Units Date/Time    Wound culture and Gram stain [[id number]] Collected:  06/30/19 1446    Lab Status: In process Specimen:  Wound from Back Updated:  06/30/19 1453    Urine Microscopic [[id number]]  (Abnormal) Collected:  06/30/19 1234    Lab Status:  Final result Specimen:  Urine, Clean Catch Updated:  06/30/19 1328     RBC, UA 4-10 /hpf      WBC, UA Innumerable /hpf      Epithelial Cells Innumerable /hpf      Bacteria, UA Innumerable /hpf      OTHER OBSERVATIONS WBCs Clumped    Urine culture [[id number]] Collected:  06/30/19 1234    Lab Status:   In process Specimen:  Urine, Clean Catch Updated:  06/30/19 1328    UA w Reflex to Microscopic w Reflex to Culture [[id number]]  (Abnormal) Collected:  06/30/19 1234    Lab Status:  Final result Specimen:  Urine, Clean Catch Updated:  06/30/19 1323     Color, UA Brianna     Clarity, UA Cloudy     Specific Gravity, UA 1 025     pH, UA 5 5     Leukocytes, UA Small     Nitrite, UA Negative     Protein, UA Negative mg/dl      Glucose, UA Negative mg/dl      Ketones, UA Negative mg/dl      Urobilinogen, UA 1 0 E U /dl      Bilirubin, UA Interference- unable to analyze     Blood, UA Moderate    Comprehensive metabolic panel [[id number]]  (Abnormal) Collected:  06/30/19 1234    Lab Status:  Final result Specimen:  Blood from Arm, Right Updated:  06/30/19 1319     Sodium 134 mmol/L      Potassium 3 6 mmol/L      Chloride 97 mmol/L      CO2 30 mmol/L      ANION GAP 7 mmol/L      BUN 13 mg/dL      Creatinine 1 03 mg/dL      Glucose 215 mg/dL      Calcium 9 3 mg/dL      AST 10 U/L      ALT 19 U/L      Alkaline Phosphatase 121 U/L      Total Protein 7 9 g/dL      Albumin 3 3 g/dL      Total Bilirubin 1 50 mg/dL      eGFR 58 ml/min/1 73sq m     Narrative:       [facility] guidelines for Chronic Kidney Disease (CKD):     Stage 1 with normal or high GFR (GFR > 90 mL/min/1 73 square meters)    Stage 2 Mild CKD (GFR = 60-89 mL/min/1 73 square meters)    Stage 3A Moderate CKD (GFR = 45-59 mL/min/1 73 square meters)    Stage 3B Moderate CKD (GFR = 30-44 mL/min/1 73 square meters)    Stage 4 Severe CKD (GFR = 15-29 mL/min/1 73 square meters)    Stage 5 End Stage CKD (GFR <15 mL/min/1 73 square meters)  Note: GFR calculation is accurate only with a steady state creatinine    CBC and differential [[id number]]  (Abnormal) Collected:  06/30/19 1234    Lab Status:  Final result Specimen:  Blood from Arm, Right Updated:  06/30/19 1249     WBC 28 67 Thousand/uL      RBC 4 55 Million/uL      Hemoglobin 12 9 g/dL      Hematocrit 39 1 %      MCV 86 fL      MCH 28 4 pg      MCHC 33 0 g/dL      RDW 12 9 %      MPV 10 7 fL      Platelets 709 Thousands/uL      nRBC 0 /100 WBCs      Neutrophils Relative 77 %      Immat GRANS % 1 %      Lymphocytes Relative 14 %      Monocytes Relative 8 %      Eosinophils Relative 0 %      Basophils Relative 0 %      Neutrophils Absolute 21 95 Thousands/µL      Immature Grans Absolute 0 26 Thousand/uL      Lymphocytes Absolute 3 98 Thousands/µL      Monocytes Absolute 2 33 Thousand/µL      Eosinophils Absolute 0 06 Thousand/µL      Basophils Absolute 0 09 Thousands/µL                  No orders to display              Procedures  Incision and drain  Date/Time: 6/30/2019 2:23 PM  Performed by: Nikky Martinez PA-C  Authorized by: Nikky Martinez PA-C     Patient location:  ED  Other Assisting Provider: No    Consent:     Consent obtained:  Verbal    Consent given by:  Patient    Risks discussed:  Bleeding, incomplete drainage and pain Alternatives discussed:  Delayed treatment  Universal protocol:     Patient identity confirmed:  Verbally with patient  Location:     Type:  Abscess    Size:  3cm    Location:  Trunk    Trunk location:  Back  Pre-procedure details:     Skin preparation:  Betadine  Anesthesia (see MAR for exact dosages): Anesthesia method:  Local infiltration    Local anesthetic:  Lidocaine 1% w/o epi  Procedure details:     Complexity:  Simple    Incision types:  Stab incision    Scalpel blade:  11    Drainage:  Bloody    Drainage amount:  Scant    Wound treatment:  Wound left open    Packing materials:  None  Post-procedure details:     Patient tolerance of procedure: Tolerated well, no immediate complications           ED Course                   Initial Sepsis Screening     Row Name 06/30/19 1414                Is the patient's history suggestive of a new or worsening infection? (!) Yes (Proceed)  -CD        Suspected source of infection  soft tissue;urinary tract infection  -CD        Are two or more of the following signs & symptoms of infection both present and new to the patient? No  -CD        Indicate SIRS criteria  Leukocytosis (WBC > 58047 IJL)  -CD        If the answer is yes to both questions, suspicion of sepsis is present          If severe sepsis is present AND tissue hypoperfusion perists in the hour after fluid resuscitation or lactate > 4, the patient meets criteria for SEPTIC SHOCK          Are any of the following organ dysfunction criteria present within 6 hours of suspected infection and SIRS criteria that are NOT considered to be chronic conditions?         Organ dysfunction          Date of presentation of severe sepsis          Time of presentation of severe sepsis          Tissue hypoperfusion persists in the hour after crystalloid fluid administration, evidenced, by either:          Was hypotension present within one hour of the conclusion of crystalloid fluid administration?           Date of presentation of septic shock          Time of presentation of septic shock            User Key  (r) = Recorded By, (t) = Taken By, (c) = Cosigned By    Initials Name Provider Type    DEEPAK Sigala PA-C Physician Assistant                  MDM  Number of Diagnoses or Management Options  Cellulitis of back: new and requires workup  UTI (urinary tract infection): new and requires workup  Diagnosis management comments: Patient with abscess to back, will I&D and culture  Patient with urinary symptoms, will order ua  Patient with elevated wbc which is chronic, but worsening, advised f/u with PCP for recheck of WBC  Will treat abscess and UTI, unfortunately patient allergic to keflex, will start on clinda for abscess and macrobid for UTI, advised probiotics to prevent yeast infection or cdiff while on antibiotics  Amount and/or Complexity of Data Reviewed  Clinical lab tests: ordered and reviewed    Patient Progress  Patient progress: stable      Disposition  Final diagnoses:   Cellulitis of back   UTI (urinary tract infection)     Time reflects when diagnosis was documented in both MDM as applicable and the Disposition within this note     Time User Action Codes Description Comment    6/30/2019  2:21 PM Jose Lambert Add [L03 312] Cellulitis of back     6/30/2019  2:21 PM Jose Lambert Add [N39 0] UTI (urinary tract infection)       ED Disposition     ED Disposition Condition Date/Time Comment    Discharge Stable Sun Jun 30, 2019  2:17  Alicia Lai Se discharge to home/self care              Follow-up Information     Follow up With Specialties Details Why Contact Info    Main Torre MD Internal Medicine In 2 days For recheck [place]  [address] [phone]            Discharge Medication List as of 6/30/2019  2:23 PM      START taking these medications    Details   clindamycin (CLEOCIN) 300 MG capsule Take 1 capsule (300 mg total) by mouth every 6 (six) hours for 7 days, Starting Sun 6/30/2019, Until Sun 7/7/2019, Normal      nitrofurantoin (MACROBID) 100 mg capsule Take 1 capsule (100 mg total) by mouth 2 (two) times a day for 5 days, Starting Sun 6/30/2019, Until Fri 7/5/2019, Normal         CONTINUE these medications which have NOT CHANGED    Details   amoxicillin (AMOXIL) 500 mg capsule Take 4 capsules by mouth, Starting Mon 12/12/2016, Historical Med      aspirin 81 MG tablet Take 1 tablet by mouth daily, Starting Mon 2/2/2015, Historical Med      diclofenac sodium (VOLTAREN) 50 mg EC tablet Take 1 tablet (50 mg total) by mouth 3 (three) times a day, Starting Tue 2/26/2019, Normal      glucose blood (FREESTYLE LITE) test strip TEST FOUR TIMES A DAY, Normal      glucose monitoring kit (FREESTYLE) monitoring kit Testing 4 times daily - DX- E11 9, Normal      insulin aspart (NOVOLOG FLEXPEN) 100 Units/mL injection pen Inject 20 Units under the skin 3 (three) times a day with meals Before meals or sliding, Starting Tue 10/9/2018, Normal      Insulin Syringe-Needle U-100 (B-D INS SYR ULTRAFINE  3CC/31G) 31G X 5/16" 0 3 ML MISC by Does not apply route, Starting Wed 10/8/2014, Historical Med      LANTUS SOLOSTAR 100 units/mL injection pen 36 units twice daily, Normal      levothyroxine 125 mcg tablet Take 1 tablet (125 mcg total) by mouth daily, Starting Tue 10/16/2018, Normal      meclizine (ANTIVERT) 12 5 MG tablet Take 1 tablet (12 5 mg total) by mouth every 8 (eight) hours as needed for dizziness or nausea Take TID for three days then prn, Starting Tue 10/9/2018, Normal      metFORMIN (GLUCOPHAGE) 500 mg tablet Take 1 tablet (500 mg total) by mouth 2 (two) times a day with meals, Starting Tue 10/9/2018, Normal      metoprolol tartrate (LOPRESSOR) 50 mg tablet TAKE ONE TABLET BY MOUTH TWICE A DAY, Normal      omeprazole (PriLOSEC) 20 mg delayed release capsule Take 20 mg by mouth daily, Historical Med      simvastatin (ZOCOR) 20 mg tablet TAKE ONE TABLET DAILY AS DIRECTED, Normal      traMADol (ULTRAM) 50 mg tablet Take 1 tablet (50 mg total) by mouth 3 (three) times a day as needed for moderate pain, Starting Tue 10/9/2018, Normal      valsartan-hydrochlorothiazide (DIOVAN-HCT) 160-25 MG per tablet TAKE ONE TABLET BY MOUTH EVERY DAY, Normal           No discharge procedures on file      ED Provider  Electronically Signed by           Jorge Luis Cabezas PA-C  06/30/19 9623

## 2019-06-30 NOTE — DISCHARGE INSTRUCTIONS
Warm soaks to your back 4-5 times a day  Take clindamycin for cellulitis of back  Take macrobid for UTI  Follow up with family doctor in 2-3 days for recheck, return to ER if symptoms worsen

## 2019-06-30 NOTE — ED NOTES
Patient appears to be sleeping  Symmetrical chest rise, no facial grimace, and comfortable position noted  Will continue to monitor        Theador MASOUD Wheeler  06/30/19 4795

## 2019-07-02 ENCOUNTER — TELEPHONE (OUTPATIENT)
Dept: FAMILY MEDICINE CLINIC | Facility: HOSPITAL | Age: 64
End: 2019-07-02

## 2019-07-02 LAB — BACTERIA UR CULT: ABNORMAL

## 2019-07-03 LAB
BACTERIA WND AEROBE CULT: ABNORMAL
GRAM STN SPEC: ABNORMAL
GRAM STN SPEC: ABNORMAL

## 2019-09-30 ENCOUNTER — APPOINTMENT (OUTPATIENT)
Dept: LAB | Facility: MEDICAL CENTER | Age: 64
End: 2019-09-30
Payer: COMMERCIAL

## 2019-09-30 ENCOUNTER — APPOINTMENT (OUTPATIENT)
Dept: RADIOLOGY | Facility: MEDICAL CENTER | Age: 64
End: 2019-09-30
Payer: COMMERCIAL

## 2019-09-30 ENCOUNTER — TELEPHONE (OUTPATIENT)
Dept: OBGYN CLINIC | Facility: MEDICAL CENTER | Age: 64
End: 2019-09-30

## 2019-09-30 ENCOUNTER — OFFICE VISIT (OUTPATIENT)
Dept: OBGYN CLINIC | Facility: MEDICAL CENTER | Age: 64
End: 2019-09-30
Payer: COMMERCIAL

## 2019-09-30 VITALS
HEIGHT: 61 IN | SYSTOLIC BLOOD PRESSURE: 134 MMHG | WEIGHT: 218.6 LBS | BODY MASS INDEX: 41.27 KG/M2 | DIASTOLIC BLOOD PRESSURE: 81 MMHG | HEART RATE: 71 BPM

## 2019-09-30 DIAGNOSIS — E11.9 TYPE 2 DIABETES MELLITUS WITHOUT COMPLICATION, WITHOUT LONG-TERM CURRENT USE OF INSULIN (HCC): ICD-10-CM

## 2019-09-30 DIAGNOSIS — M25.561 RIGHT KNEE PAIN, UNSPECIFIED CHRONICITY: ICD-10-CM

## 2019-09-30 DIAGNOSIS — Z01.89 ENCOUNTER FOR LOWER EXTREMITY COMPARISON IMAGING STUDY: ICD-10-CM

## 2019-09-30 DIAGNOSIS — E03.9 ACQUIRED HYPOTHYROIDISM: ICD-10-CM

## 2019-09-30 DIAGNOSIS — Z01.89 ENCOUNTER FOR LOWER EXTREMITY COMPARISON IMAGING STUDY: Primary | ICD-10-CM

## 2019-09-30 LAB
EST. AVERAGE GLUCOSE BLD GHB EST-MCNC: 171 MG/DL
HBA1C MFR BLD: 7.6 % (ref 4.2–6.3)
TSH SERPL DL<=0.05 MIU/L-ACNC: 0.1 UIU/ML (ref 0.36–3.74)

## 2019-09-30 PROCEDURE — 73560 X-RAY EXAM OF KNEE 1 OR 2: CPT

## 2019-09-30 PROCEDURE — 83036 HEMOGLOBIN GLYCOSYLATED A1C: CPT

## 2019-09-30 PROCEDURE — 84443 ASSAY THYROID STIM HORMONE: CPT

## 2019-09-30 PROCEDURE — 73564 X-RAY EXAM KNEE 4 OR MORE: CPT

## 2019-09-30 PROCEDURE — 36415 COLL VENOUS BLD VENIPUNCTURE: CPT

## 2019-09-30 PROCEDURE — 99203 OFFICE O/P NEW LOW 30 MIN: CPT | Performed by: ORTHOPAEDIC SURGERY

## 2019-09-30 NOTE — TELEPHONE ENCOUNTER
Patient understands that DR Reynoso will be seeing her for her right native knee only later today  She mentioned that she does have hx LTKA done 7/9/14 [facility]  No issues with her LTKA but knows to seek out records if she wants Dr Jamaal English to look at this TKA  Agreed to come in for her right knee only

## 2019-09-30 NOTE — TELEPHONE ENCOUNTER
Called and spoke to patient  She understands that she will be seen for her RT native knee only  She mentioned she had a LTKA done in [address] [place] [address] Drive 7/9/14  Her LTKA is fine with no current issues  I did make her aware that we need her records should she start having issues but that we will primary see her for right non-surgical knee  Patient was polite and agrees with plan       # [phone]

## 2019-09-30 NOTE — PROGRESS NOTES
Assessment/Plan     1  Encounter for lower extremity comparison imaging study    2  Right knee pain, unspecified chronicity    3  Type 2 diabetes mellitus without complication, without long-term current use of insulin ([facility] [address] )      Orders Placed This Encounter   Procedures    XR knee 4+ vw right injury    XR knee 1 or 2 vw left    HEMOGLOBIN A1C W/ EAG ESTIMATION     Diagnosis, treatment options and associated risks were discussed with the patient including no treatment, nonsurgical treatment and potential for surgical intervention  The patient was given the opportunity to ask questions regarding each  Sherry Monte is treating for RIGHT knee osteoarthritis  Conservative treatments discussed with the patient that consists of cortisone injections - Her fasting sugar this morning was 140  It's on the higher side  Visco injections could be an option  Jogordo Monte is interested in a RIGHT total knee arthroplasty  Criteria for a total knee was discussed that included: Weight loss was discussed with the patient due to her higher BMI  It's recommended to get her diabetes controled by seeing her PCP, weight loss of 7lbs to help her BMI to 40  Her thyroid medication is being regulated  A hinged brace was shown the patient to see if this an option for her  She has declined the brace today  Would consider an  brace at the next visit  Return for Recheck for her RIGHT knee OA  I answered all of the patient's questions during the visit and provided education of the patient's condition during the visit  The patient verbalized understanding of the information given and agrees with the plan  This note was dictated using [email or web address] software  It may contain errors including improperly dictated words  Please contact physician directly for any questions      History of Present Illness   Chief complaint:   Chief Complaint   Patient presents with    Right Knee - Pain       HPI: Osman Araujo is a 60 y o  female that c/o right knee pain  She has had no conservative treatments for the RIGHT knee  She had PT in the past with minimal relief  She does take Diclofenac daily for her Sjogren  She notes that over the last year the right knee has progressively gotten worse  She has trouble walking, pain and difficulty going up and down stairs  She notes weakness due to disuse  She presents with no ambulating devices  She previously treated with Dr Jerry Hicks, PCP for her LEFT knee osteoarthritis with a course of oral NSAIDs (Diclofenac), Cortisone injections and visco injections with little to no relief  She then met with Dr Vito Lee who scheduled a RIGHT TKA vs RIGHT unicompartmental replacement, 9/2016 but was canceled  She has a hx of a Left TKA, 7/9/14 performed at [facility] by Dr Balbina Schroeder  She feels that the left knee is "loose" or "unstable "       ROS:    See HPI for musculoskeletal review     All other systems reviewed are negative     Historical Information   Past Medical History:   Diagnosis Date    History of total left knee replacement (TKR) 2013    Obesity     TMJ (dislocation of temporomandibular joint)     Trigeminal neuralgia      Past Surgical History:   Procedure Laterality Date    CARDIAC CATHETERIZATION      HYSTERECTOMY      TONSILLECTOMY      US GUIDED BREAST BIOPSY RIGHT COMPLETE Right 9/5/2018     Social History   Social History     Substance and Sexual Activity   Alcohol Use No     Social History     Substance and Sexual Activity   Drug Use No     Social History     Tobacco Use   Smoking Status Never Smoker   Smokeless Tobacco Never Used   Tobacco Comment    As per [employer] former smoker quit over 10 yrs ago     Family History:   Family History   Problem Relation Age of Onset    Colon cancer Mother     Dementia Father     Depression Father     Diabetes Father     Stroke Father     Parkinsonism Father     GI problems Family         GI malignancy       Current Outpatient Medications on File Prior to Visit   Medication Sig Dispense Refill    amoxicillin (AMOXIL) 500 mg capsule Take 4 capsules by mouth      aspirin 81 MG tablet Take 1 tablet by mouth daily      diclofenac sodium (VOLTAREN) 50 mg EC tablet Take 1 tablet (50 mg total) by mouth 3 (three) times a day 90 tablet 5    glucose blood (FREESTYLE LITE) test strip TEST FOUR TIMES A  each 5    glucose monitoring kit (FREESTYLE) monitoring kit Testing 4 times daily - DX- E11 9 1 each 0    insulin aspart (NOVOLOG FLEXPEN) 100 Units/mL injection pen Inject 20 Units under the skin 3 (three) times a day with meals Before meals or sliding 10 pen 5    Insulin Syringe-Needle U-100 (B-D INS SYR ULTRAFINE  3CC/31G) 31G X 5/16" 0 3 ML MISC by Does not apply route      LANTUS SOLOSTAR 100 units/mL injection pen 36 units twice daily 5 pen 5    levothyroxine 125 mcg tablet Take 1 tablet (125 mcg total) by mouth daily 30 tablet 11    meclizine (ANTIVERT) 12 5 MG tablet Take 1 tablet (12 5 mg total) by mouth every 8 (eight) hours as needed for dizziness or nausea Take TID for three days then prn 20 tablet 0    metFORMIN (GLUCOPHAGE) 500 mg tablet Take 1 tablet (500 mg total) by mouth 2 (two) times a day with meals 180 tablet 3    metoprolol tartrate (LOPRESSOR) 50 mg tablet TAKE ONE TABLET BY MOUTH TWICE A DAY 60 tablet 11    omeprazole (PriLOSEC) 20 mg delayed release capsule Take 20 mg by mouth daily      simvastatin (ZOCOR) 20 mg tablet TAKE ONE TABLET DAILY AS DIRECTED 30 tablet 11    traMADol (ULTRAM) 50 mg tablet Take 1 tablet (50 mg total) by mouth 3 (three) times a day as needed for moderate pain 90 tablet 0    valsartan-hydrochlorothiazide (DIOVAN-HCT) 160-25 MG per tablet TAKE ONE TABLET BY MOUTH EVERY DAY 30 tablet 5     No current facility-administered medications on file prior to visit        Allergies   Allergen Reactions    Niacin Anaphylaxis     Annotation - [id number]: Flushing    Ciprofloxacin     Pioglitazone Hives    Cefprozil Rash    Cefuroxime Rash    Ramipril Rash       Current Outpatient Medications on File Prior to Visit   Medication Sig Dispense Refill    amoxicillin (AMOXIL) 500 mg capsule Take 4 capsules by mouth      aspirin 81 MG tablet Take 1 tablet by mouth daily      diclofenac sodium (VOLTAREN) 50 mg EC tablet Take 1 tablet (50 mg total) by mouth 3 (three) times a day 90 tablet 5    glucose blood (FREESTYLE LITE) test strip TEST FOUR TIMES A  each 5    glucose monitoring kit (FREESTYLE) monitoring kit Testing 4 times daily - DX- E11 9 1 each 0    insulin aspart (NOVOLOG FLEXPEN) 100 Units/mL injection pen Inject 20 Units under the skin 3 (three) times a day with meals Before meals or sliding 10 pen 5    Insulin Syringe-Needle U-100 (B-D INS SYR ULTRAFINE  3CC/31G) 31G X 5/16" 0 3 ML MISC by Does not apply route      LANTUS SOLOSTAR 100 units/mL injection pen 36 units twice daily 5 pen 5    levothyroxine 125 mcg tablet Take 1 tablet (125 mcg total) by mouth daily 30 tablet 11    meclizine (ANTIVERT) 12 5 MG tablet Take 1 tablet (12 5 mg total) by mouth every 8 (eight) hours as needed for dizziness or nausea Take TID for three days then prn 20 tablet 0    metFORMIN (GLUCOPHAGE) 500 mg tablet Take 1 tablet (500 mg total) by mouth 2 (two) times a day with meals 180 tablet 3    metoprolol tartrate (LOPRESSOR) 50 mg tablet TAKE ONE TABLET BY MOUTH TWICE A DAY 60 tablet 11    omeprazole (PriLOSEC) 20 mg delayed release capsule Take 20 mg by mouth daily      simvastatin (ZOCOR) 20 mg tablet TAKE ONE TABLET DAILY AS DIRECTED 30 tablet 11    traMADol (ULTRAM) 50 mg tablet Take 1 tablet (50 mg total) by mouth 3 (three) times a day as needed for moderate pain 90 tablet 0    valsartan-hydrochlorothiazide (DIOVAN-HCT) 160-25 MG per tablet TAKE ONE TABLET BY MOUTH EVERY DAY 30 tablet 5     No current facility-administered medications on file prior to visit          Objective   Vitals: Blood pressure 134/81, pulse 71, height 5' 1" (1 549 m), weight 99 2 kg (218 lb 9 6 oz), not currently breastfeeding  ,Body mass index is 41 3 kg/m²  PE:  AAOx 3  WDWN  Hearing intact, no drainage from eyes  Regular rate  no audible wheezing  no abdominal distension  LE compartments soft, skin intact    rightknee:    Appearance:  no swelling   No ecchymosis  no obvious joint deformity   No effusion  Palpation/Tenderness:  +TTP over medial joint line  No TTP over lateral joint line   No TTP over patella  No TTP over patellar tendon  No TTP over pes anserine bursa  Active Range of Motion:  AROM: full 0-105 degrees PROM 0-115 degrees  + patellar crepitation with flexion    LEFT knee AROM 5-105 degrees- no laxity with varus or valgus at 0 degrees, at 30 degrees laxity with varus stress none with valgus  Mild laxity at the anterior posterior direction  Special Tests:RIGHT  Medial Tashia's Test:  Negative  Lateral Tashia's Test:  Negative  Apley's compression test:  Negative  Lachman's Test:  negative  Anterior Drawer Test:  Negative  Patellar grind:  Negative  Valgus Stress Test:  negative  Varus Stress Test:  negative     No ipsilateral hip pain with ROM bilaterally  rightLE:    Sensation grossly intact  Palpable DP pulse  AT/GS/EHL intact    Imaging Studies: I have personally reviewed pertinent films in PACS  RIGHTknee: advanced arthritic change to the medial compartment, along with bone spurring posterior medial on PA flexed view, [place] view osteophyte formation both medial and lateral aspect of the patella       Scribe Attestation    I,:   Tamar Grande am acting as a scribe while in the presence of the attending physician :        I,:   Adri Delgado DO personally performed the services described in this documentation    as scribed in my presence :

## 2019-10-01 ENCOUNTER — TELEPHONE (OUTPATIENT)
Dept: FAMILY MEDICINE CLINIC | Facility: HOSPITAL | Age: 64
End: 2019-10-01

## 2019-10-15 ENCOUNTER — OFFICE VISIT (OUTPATIENT)
Dept: FAMILY MEDICINE CLINIC | Facility: HOSPITAL | Age: 64
End: 2019-10-15
Payer: COMMERCIAL

## 2019-10-15 VITALS
WEIGHT: 218.4 LBS | SYSTOLIC BLOOD PRESSURE: 160 MMHG | BODY MASS INDEX: 42.88 KG/M2 | DIASTOLIC BLOOD PRESSURE: 100 MMHG | HEART RATE: 78 BPM | HEIGHT: 60 IN

## 2019-10-15 DIAGNOSIS — Z23 NEED FOR VACCINATION: Primary | ICD-10-CM

## 2019-10-15 DIAGNOSIS — E01.0 THYROMEGALY: ICD-10-CM

## 2019-10-15 DIAGNOSIS — Z12.31 ENCOUNTER FOR SCREENING MAMMOGRAM FOR BREAST CANCER: ICD-10-CM

## 2019-10-15 DIAGNOSIS — E78.00 PURE HYPERCHOLESTEROLEMIA: ICD-10-CM

## 2019-10-15 DIAGNOSIS — I10 BENIGN ESSENTIAL HYPERTENSION: ICD-10-CM

## 2019-10-15 DIAGNOSIS — E11.9 TYPE 2 DIABETES MELLITUS WITHOUT COMPLICATION, WITHOUT LONG-TERM CURRENT USE OF INSULIN (HCC): ICD-10-CM

## 2019-10-15 DIAGNOSIS — M17.11 PRIMARY OSTEOARTHRITIS OF RIGHT KNEE: ICD-10-CM

## 2019-10-15 PROCEDURE — 99214 OFFICE O/P EST MOD 30 MIN: CPT | Performed by: INTERNAL MEDICINE

## 2019-10-15 PROCEDURE — 90471 IMMUNIZATION ADMIN: CPT

## 2019-10-15 PROCEDURE — 90682 RIV4 VACC RECOMBINANT DNA IM: CPT

## 2019-10-15 RX ORDER — AMLODIPINE BESYLATE 5 MG/1
5 TABLET ORAL DAILY
Qty: 30 TABLET | Refills: 5 | Status: SHIPPED | OUTPATIENT
Start: 2019-10-15 | End: 2020-04-10

## 2019-10-15 NOTE — PROGRESS NOTES
BMI Counseling: Body mass index is 42 88 kg/m²  The BMI is above normal  Nutrition recommendations include reducing portion sizes  Assessment/Plan:       Diagnoses and all orders for this visit:    Need for vaccination  -     influenza vaccine, 7122-8377, quadrivalent, recombinant, PF, 0 5 mL, for patients 18 yr+ (FLUBLOK)    Encounter for screening mammogram for breast cancer  -     Mammo screening bilateral w 3d & cad; Future    Benign essential hypertension  -     amLODIPine (NORVASC) 5 mg tablet; Take 1 tablet (5 mg total) by mouth daily    Type 2 diabetes mellitus without complication, without long-term current use of insulin (HCC)  -     Comprehensive metabolic panel; Future  -     Lipid Panel with Direct LDL reflex; Future  -     Microalbumin / creatinine urine ratio    Primary osteoarthritis of right knee    Thyromegaly  -     US thyroid; Future  -     T4, free; Future    Pure hypercholesterolemia          All of the above diagnoses have been assessed  Additional COMMENTS/PLAN:  Will see back in 4 weeks with attention to blood pressure on the addition of amlodipine    Patient will have a free T4 level as she has suppressed TSH  She may have secondary hypo thyroidism  Ultrasound will be done thyroid gland  I have asked her to drop off sugars in 2 weeks so I can fine-tune her insulin  I may ask Endocrinology to evaluate both the diabetes and her thyroid if the blood tests are not clear on this  Subjective:      Patient ID: Chase Saucedo is a 60 y o  female  HPI     DJD Knee-this is R knee  Has had the Left knee replaced  Ortho would not do surgery due to weight and DM  DM- checks sugars 3x per day  Has been having some highs in the AM    HTN-compliant with meds at current doses  The following portions of the patient's history were revised and updated as appropriate: Problem list, allergies, med list, FH, SH, Past medical and surgical histories      Current Outpatient Medications   Medication Sig Dispense Refill    aspirin 81 MG tablet Take 1 tablet by mouth daily      diclofenac sodium (VOLTAREN) 50 mg EC tablet Take 1 tablet (50 mg total) by mouth 3 (three) times a day 90 tablet 5    glucose blood (FREESTYLE LITE) test strip TEST FOUR TIMES A  each 5    glucose monitoring kit (FREESTYLE) monitoring kit Testing 4 times daily - DX- E11 9 1 each 0    insulin aspart (NOVOLOG FLEXPEN) 100 Units/mL injection pen Inject 20 Units under the skin 3 (three) times a day with meals Before meals or sliding 10 pen 5    Insulin Syringe-Needle U-100 (B-D INS SYR ULTRAFINE  3CC/31G) 31G X 5/16" 0 3 ML MISC by Does not apply route      LANTUS SOLOSTAR 100 units/mL injection pen 36 units twice daily 5 pen 5    levothyroxine 125 mcg tablet Take 1 tablet (125 mcg total) by mouth daily 30 tablet 11    meclizine (ANTIVERT) 12 5 MG tablet Take 1 tablet (12 5 mg total) by mouth every 8 (eight) hours as needed for dizziness or nausea Take TID for three days then prn 20 tablet 0    metoprolol tartrate (LOPRESSOR) 50 mg tablet TAKE ONE TABLET BY MOUTH TWICE A DAY 60 tablet 11    omeprazole (PriLOSEC) 20 mg delayed release capsule Take 20 mg by mouth daily      simvastatin (ZOCOR) 20 mg tablet TAKE ONE TABLET DAILY AS DIRECTED 30 tablet 11    traMADol (ULTRAM) 50 mg tablet Take 1 tablet (50 mg total) by mouth 3 (three) times a day as needed for moderate pain 90 tablet 0    valsartan-hydrochlorothiazide (DIOVAN-HCT) 160-25 MG per tablet TAKE ONE TABLET BY MOUTH EVERY DAY 30 tablet 5    amLODIPine (NORVASC) 5 mg tablet Take 1 tablet (5 mg total) by mouth daily 30 tablet 5    metFORMIN (GLUCOPHAGE) 500 mg tablet Take 1 tablet (500 mg total) by mouth 2 (two) times a day with meals (Patient not taking: Reported on 10/15/2019) 180 tablet 3     No current facility-administered medications for this visit  Review of Systems   HENT:        Has feeling on mass in the neck  Objective:    /100 (BP Location: Left arm, Patient Position: Sitting, Cuff Size: Standard)   Pulse 78   Ht 4' 11 84" (1 52 m)   Wt 99 1 kg (218 lb 6 4 oz)   LMP  (LMP Unknown)   BMI 42 88 kg/m²     BP Readings from Last 3 Encounters:   10/15/19 160/100   09/30/19 134/81   06/30/19 125/58                  Wt Readings from Last 3 Encounters:   10/15/19 99 1 kg (218 lb 6 4 oz)   09/30/19 99 2 kg (218 lb 9 6 oz)   06/30/19 96 2 kg (212 lb 1 3 oz)         Physical Exam   Constitutional: She appears well-developed and well-nourished  Neck: Thyromegaly present  Cardiovascular: Normal rate  Pulses are weak pulses  Pulses:       Dorsalis pedis pulses are 1+ on the right side, and 1+ on the left side  Posterior tibial pulses are 1+ on the right side, and 1+ on the left side  Pulmonary/Chest: Breath sounds normal    Abdominal: Bowel sounds are normal  There is no tenderness  Musculoskeletal: She exhibits no edema  Significant degenerative joint changes of the right knee   Feet:   Right Foot:   Skin Integrity: Negative for ulcer, skin breakdown, erythema, warmth, callus or dry skin  Left Foot:   Skin Integrity: Negative for ulcer, skin breakdown, erythema, warmth, callus or dry skin  Vitals reviewed  Patient's shoes and socks removed  Right Foot/Ankle   Right Foot Inspection  Skin Exam: skin normal and skin intact no dry skin, no warmth, no callus, no erythema, no maceration, no abnormal color, no pre-ulcer, no ulcer and no callus                            Sensory   Vibration: intact      Vascular    The right DP pulse is 1+  The right PT pulse is 1+  Left Foot/Ankle  Left Foot Inspection  Skin Exam: skin normal and skin intactno dry skin, no warmth, no erythema, no maceration, normal color, no pre-ulcer, no ulcer and no callus                                         Sensory   Vibration: intact      Vascular    The left DP pulse is 1+  The left PT pulse is 1+     Assign Risk Category:  No deformity present; No loss of protective sensation; Weak pulses       Risk: 0      No visits with results within 2 Week(s) from this visit  Latest known visit with results is:   Appointment on 09/30/2019   Component Date Value Ref Range Status    Hemoglobin A1C 09/30/2019 7 6* 4 2 - 6 3 % Final    EAG 09/30/2019 171  mg/dl Final    TSH 3RD GENERATON 09/30/2019 0 098* 0 358 - 3 740 uIU/mL Final      The recommended reference ranges for TSH during pregnancy are as follows:   First trimester 0 1 to 2 5 uIU/mL   Second trimester  0 2 to 3 0 uIU/mL   Third trimester 0 3 to 3 0 uIU/m    Note: Normal ranges may not apply to patients who are transgender, non-binary, or whose legal sex, sex at birth, and gender identity differ  Using supplements with high doses of biotin 20 to more than 300 times greater than the adequate daily intake for adults of 30 mcg/day as established by the [place] [employer], can cause falsely depress results           Horacio Marie MD

## 2019-10-15 NOTE — PATIENT INSTRUCTIONS
Check sugars 3 times a day varying between before breakfast before lunch before supper and before bedtime    Drop off in 2  weeks

## 2019-10-16 ENCOUNTER — TELEPHONE (OUTPATIENT)
Dept: FAMILY MEDICINE CLINIC | Facility: HOSPITAL | Age: 64
End: 2019-10-16

## 2019-10-19 ENCOUNTER — LAB (OUTPATIENT)
Dept: LAB | Facility: HOSPITAL | Age: 64
End: 2019-10-19
Attending: INTERNAL MEDICINE
Payer: COMMERCIAL

## 2019-10-19 DIAGNOSIS — E03.9 ACQUIRED HYPOTHYROIDISM: ICD-10-CM

## 2019-10-19 DIAGNOSIS — E01.0 THYROMEGALY: ICD-10-CM

## 2019-10-19 DIAGNOSIS — E11.9 TYPE 2 DIABETES MELLITUS WITHOUT COMPLICATION, WITHOUT LONG-TERM CURRENT USE OF INSULIN (HCC): ICD-10-CM

## 2019-10-19 DIAGNOSIS — I10 BENIGN ESSENTIAL HYPERTENSION: ICD-10-CM

## 2019-10-19 DIAGNOSIS — M17.0 ARTHRITIS OF BOTH KNEES: ICD-10-CM

## 2019-10-19 LAB
ALBUMIN SERPL BCP-MCNC: 3.6 G/DL (ref 3.5–5)
ALP SERPL-CCNC: 118 U/L (ref 46–116)
ALT SERPL W P-5'-P-CCNC: 35 U/L (ref 12–78)
ANION GAP SERPL CALCULATED.3IONS-SCNC: 8 MMOL/L (ref 4–13)
AST SERPL W P-5'-P-CCNC: 27 U/L (ref 5–45)
BILIRUB SERPL-MCNC: 0.7 MG/DL (ref 0.2–1)
BUN SERPL-MCNC: 13 MG/DL (ref 5–25)
CALCIUM SERPL-MCNC: 8.8 MG/DL (ref 8.3–10.1)
CHLORIDE SERPL-SCNC: 103 MMOL/L (ref 100–108)
CHOLEST SERPL-MCNC: 131 MG/DL (ref 50–200)
CO2 SERPL-SCNC: 28 MMOL/L (ref 21–32)
CREAT SERPL-MCNC: 0.82 MG/DL (ref 0.6–1.3)
CREAT UR-MCNC: 66.4 MG/DL
GFR SERPL CREATININE-BSD FRML MDRD: 77 ML/MIN/1.73SQ M
GLUCOSE P FAST SERPL-MCNC: 101 MG/DL (ref 65–99)
HDLC SERPL-MCNC: 40 MG/DL (ref 40–60)
LDLC SERPL CALC-MCNC: 66 MG/DL (ref 0–100)
MICROALBUMIN UR-MCNC: 5 MG/L (ref 0–20)
MICROALBUMIN/CREAT 24H UR: 8 MG/G CREATININE (ref 0–30)
POTASSIUM SERPL-SCNC: 4.1 MMOL/L (ref 3.5–5.3)
PROT SERPL-MCNC: 8.1 G/DL (ref 6.4–8.2)
SODIUM SERPL-SCNC: 139 MMOL/L (ref 136–145)
T4 FREE SERPL-MCNC: 1.64 NG/DL (ref 0.76–1.46)
TRIGL SERPL-MCNC: 123 MG/DL

## 2019-10-19 PROCEDURE — 80053 COMPREHEN METABOLIC PANEL: CPT

## 2019-10-19 PROCEDURE — 82043 UR ALBUMIN QUANTITATIVE: CPT | Performed by: INTERNAL MEDICINE

## 2019-10-19 PROCEDURE — 80061 LIPID PANEL: CPT

## 2019-10-19 PROCEDURE — 82570 ASSAY OF URINE CREATININE: CPT | Performed by: INTERNAL MEDICINE

## 2019-10-19 PROCEDURE — 36415 COLL VENOUS BLD VENIPUNCTURE: CPT

## 2019-10-19 PROCEDURE — 84439 ASSAY OF FREE THYROXINE: CPT

## 2019-10-19 RX ORDER — SIMVASTATIN 20 MG
TABLET ORAL
Qty: 30 TABLET | Refills: 5 | Status: SHIPPED | OUTPATIENT
Start: 2019-10-19 | End: 2019-10-29 | Stop reason: SDUPTHER

## 2019-10-19 RX ORDER — LEVOTHYROXINE SODIUM 0.12 MG/1
TABLET ORAL
Qty: 30 TABLET | Refills: 11 | Status: SHIPPED | OUTPATIENT
Start: 2019-10-19 | End: 2019-10-21 | Stop reason: DRUGHIGH

## 2019-10-19 RX ORDER — VALSARTAN AND HYDROCHLOROTHIAZIDE 160; 25 MG/1; MG/1
TABLET ORAL
Qty: 30 TABLET | Refills: 5 | Status: SHIPPED | OUTPATIENT
Start: 2019-10-19 | End: 2020-04-26

## 2019-10-21 ENCOUNTER — HOSPITAL ENCOUNTER (OUTPATIENT)
Dept: ULTRASOUND IMAGING | Facility: HOSPITAL | Age: 64
Discharge: HOME | End: 2019-10-21
Attending: INTERNAL MEDICINE
Payer: COMMERCIAL

## 2019-10-21 DIAGNOSIS — E03.9 ACQUIRED HYPOTHYROIDISM: ICD-10-CM

## 2019-10-21 DIAGNOSIS — E01.0 THYROMEGALY: ICD-10-CM

## 2019-10-21 DIAGNOSIS — E03.9 ACQUIRED HYPOTHYROIDISM: Primary | ICD-10-CM

## 2019-10-21 PROCEDURE — 76536 US EXAM OF HEAD AND NECK: CPT

## 2019-10-21 RX ORDER — LEVOTHYROXINE SODIUM 0.1 MG/1
100 TABLET ORAL DAILY
Qty: 30 TABLET | Refills: 11 | Status: SHIPPED | OUTPATIENT
Start: 2019-10-21 | End: 2020-05-12 | Stop reason: SDUPTHER

## 2019-10-24 ENCOUNTER — TELEPHONE (OUTPATIENT)
Dept: FAMILY MEDICINE CLINIC | Facility: HOSPITAL | Age: 64
End: 2019-10-24

## 2019-10-24 NOTE — TELEPHONE ENCOUNTER
US shows that thyroid is enlarged with some inflammation  Should get whatever labs dr Torres Matters ordered and follow up with him at next visit  This needs doretha addressed but is not an emergency and can wait for visit

## 2019-10-29 ENCOUNTER — OFFICE VISIT (OUTPATIENT)
Dept: FAMILY MEDICINE CLINIC | Facility: HOSPITAL | Age: 64
End: 2019-10-29
Payer: COMMERCIAL

## 2019-10-29 VITALS
WEIGHT: 218 LBS | DIASTOLIC BLOOD PRESSURE: 72 MMHG | SYSTOLIC BLOOD PRESSURE: 140 MMHG | HEIGHT: 60 IN | BODY MASS INDEX: 42.8 KG/M2 | HEART RATE: 74 BPM

## 2019-10-29 DIAGNOSIS — E11.9 TYPE 2 DIABETES MELLITUS WITHOUT COMPLICATION, WITHOUT LONG-TERM CURRENT USE OF INSULIN (HCC): ICD-10-CM

## 2019-10-29 DIAGNOSIS — I10 BENIGN ESSENTIAL HYPERTENSION: ICD-10-CM

## 2019-10-29 DIAGNOSIS — E01.0 THYROMEGALY: ICD-10-CM

## 2019-10-29 DIAGNOSIS — E03.9 ACQUIRED HYPOTHYROIDISM: Primary | ICD-10-CM

## 2019-10-29 PROCEDURE — 99213 OFFICE O/P EST LOW 20 MIN: CPT | Performed by: INTERNAL MEDICINE

## 2019-10-29 NOTE — PROGRESS NOTES
Assessment/Plan:       Diagnoses and all orders for this visit:    Acquired hypothyroidism  -     Ambulatory referral to Endocrinology; Future    Type 2 diabetes mellitus without complication, without long-term current use of insulin ([facility] [address] )  -     Ambulatory referral to Endocrinology; Future    Benign essential hypertension    Thyromegaly  -     Ambulatory referral to Endocrinology; Future          All of the above diagnoses have been assessed  Additional COMMENTS/PLAN: will send to endocrine  Subjective:      Patient ID: Hernando Pradhan is a 60 y o  female  HPI     Patient here to discuss thyromegaly  Also has been hypothyroid for quite some time and now the previous dose of thyroxine that she is on seem to be over supply  The question is whether not the patient is now hyperthyroid  DM-this has not been doing well  Will send to endo for both problems  Will be dropping off sugars in 2 weeks  The following portions of the patient's history were revised and updated as appropriate: Problem list, allergies, med list, FH, SH, Past medical and surgical histories      Current Outpatient Medications   Medication Sig Dispense Refill    amLODIPine (NORVASC) 5 mg tablet Take 1 tablet (5 mg total) by mouth daily 30 tablet 5    aspirin 81 MG tablet Take 1 tablet by mouth daily      diclofenac sodium (VOLTAREN) 50 mg EC tablet TAKE ONE TABLET BY MOUTH THREE TIMES A DAY 90 tablet 5    glucose blood (FREESTYLE LITE) test strip TEST FOUR TIMES A  each 5    glucose monitoring kit (FREESTYLE) monitoring kit Testing 4 times daily - DX- E11 9 1 each 0    insulin aspart (NOVOLOG FLEXPEN) 100 Units/mL injection pen Inject 20 Units under the skin 3 (three) times a day with meals Before meals or sliding 10 pen 5    Insulin Syringe-Needle U-100 (B-D INS SYR ULTRAFINE  3CC/31G) 31G X 5/16" 0 3 ML MISC by Does not apply route      LANTUS SOLOSTAR 100 units/mL injection pen 36 units twice daily 5 pen 5    levothyroxine 100 mcg tablet Take 1 tablet (100 mcg total) by mouth daily 30 tablet 11    meclizine (ANTIVERT) 12 5 MG tablet Take 1 tablet (12 5 mg total) by mouth every 8 (eight) hours as needed for dizziness or nausea Take TID for three days then prn 20 tablet 0    metoprolol tartrate (LOPRESSOR) 50 mg tablet TAKE ONE TABLET BY MOUTH TWICE A DAY 60 tablet 11    omeprazole (PriLOSEC) 20 mg delayed release capsule Take 20 mg by mouth daily      simvastatin (ZOCOR) 20 mg tablet TAKE ONE TABLET DAILY AS DIRECTED 30 tablet 11    traMADol (ULTRAM) 50 mg tablet Take 1 tablet (50 mg total) by mouth 3 (three) times a day as needed for moderate pain 90 tablet 0    valsartan-hydrochlorothiazide (DIOVAN-HCT) 160-25 MG per tablet TAKE ONE TABLET BY MOUTH EVERY DAY 30 tablet 5    metFORMIN (GLUCOPHAGE) 500 mg tablet Take 1 tablet (500 mg total) by mouth 2 (two) times a day with meals (Patient not taking: Reported on 10/15/2019) 180 tablet 3     No current facility-administered medications for this visit  Review of Systems   All other systems reviewed and are negative  Objective:    /72   Pulse 74   Ht 4' 11 85" (1 52 m)   Wt 98 9 kg (218 lb)   LMP  (LMP Unknown)   BMI 42 79 kg/m²     BP Readings from Last 3 Encounters:   10/29/19 140/72   10/15/19 160/100   09/30/19 134/81                  Wt Readings from Last 3 Encounters:   10/29/19 98 9 kg (218 lb)   10/15/19 99 1 kg (218 lb 6 4 oz)   09/30/19 99 2 kg (218 lb 9 6 oz)         Physical Exam   Constitutional: She appears well-developed and well-nourished  Neck: Thyromegaly present  Cardiovascular: Normal rate and regular rhythm  Pulmonary/Chest: Effort normal and breath sounds normal    Vitals reviewed          Lab on 10/19/2019   Component Date Value Ref Range Status    Sodium 10/19/2019 139  136 - 145 mmol/L Final    Potassium 10/19/2019 4 1  3 5 - 5 3 mmol/L Final    Chloride 10/19/2019 103  100 - 108 mmol/L Final    CO2 10/19/2019 28  21 - 32 mmol/L Final    ANION GAP 10/19/2019 8  4 - 13 mmol/L Final    BUN 10/19/2019 13  5 - 25 mg/dL Final    Creatinine 10/19/2019 0 82  0 60 - 1 30 mg/dL Final    Standardized to IDMS reference method    Glucose, Fasting 10/19/2019 101* 65 - 99 mg/dL Final      Specimen collection should occur prior to Sulfasalazine administration due to the potential for falsely depressed results  Specimen collection should occur prior to Sulfapyridine administration due to the potential for falsely elevated results   Calcium 10/19/2019 8 8  8 3 - 10 1 mg/dL Final    AST 10/19/2019 27  5 - 45 U/L Final      Specimen collection should occur prior to Sulfasalazine administration due to the potential for falsely depressed results   ALT 10/19/2019 35  12 - 78 U/L Final      Specimen collection should occur prior to Sulfasalazine administration due to the potential for falsely depressed results   Alkaline Phosphatase 10/19/2019 118* 46 - 116 U/L Final    Total Protein 10/19/2019 8 1  6 4 - 8 2 g/dL Final    Albumin 10/19/2019 3 6  3 5 - 5 0 g/dL Final    Total Bilirubin 10/19/2019 0 70  0 20 - 1 00 mg/dL Final    eGFR 10/19/2019 77  ml/min/1 73sq m Final    Cholesterol 10/19/2019 131  50 - 200 mg/dL Final      Cholesterol:       Desirable         <200 mg/dl       Borderline         200-239 mg/dl       High              >239           Triglycerides 10/19/2019 123  <=150 mg/dL Final      Triglyceride:     Normal          <150 mg/dl     Borderline High 150-199 mg/dl     High            200-499 mg/dl        Very High       >499 mg/dl    Specimen collection should occur prior to N-Acetylcysteine or Metamizole administration due to the potential for falsely depressed results   HDL, Direct 10/19/2019 40  40 - 60 mg/dL Final      HDL Cholesterol:       High    >60 mg/dL       Low     <41 mg/dL  Specimen collection should occur prior to Metamizole administration due to the potential for falsley depressed results   LDL Calculated 10/19/2019 66  0 - 100 mg/dL Final      LDL Cholesterol:     Optimal           <100 mg/dl     Near Optimal      100-129 mg/dl     Above Optimal       Borderline High 130-159 mg/dl       High            160-189 mg/dl       Very High       >189 mg/dl         This screening LDL is a calculated result  It does not have the accuracy of the Direct Measured LDL in the monitoring of patients with hyperlipidemia and/or statin therapy  Direct Measure LDL ([id number]) must be ordered separately in these patients   Free T4 10/19/2019 1 64* 0 76 - 1 46 ng/dL Final      Specimen collection should occur prior to Sulfasalazine administration due to the potential for falsely elevated results           Jose Luis Solorio MD

## 2019-11-01 ENCOUNTER — TELEPHONE (OUTPATIENT)
Dept: FAMILY MEDICINE CLINIC | Facility: HOSPITAL | Age: 64
End: 2019-11-01

## 2019-11-07 ENCOUNTER — OFFICE VISIT (OUTPATIENT)
Dept: ENDOCRINOLOGY | Facility: HOSPITAL | Age: 64
End: 2019-11-07
Payer: COMMERCIAL

## 2019-11-07 VITALS
DIASTOLIC BLOOD PRESSURE: 74 MMHG | SYSTOLIC BLOOD PRESSURE: 130 MMHG | BODY MASS INDEX: 43.43 KG/M2 | HEIGHT: 60 IN | HEART RATE: 72 BPM | WEIGHT: 221.2 LBS

## 2019-11-07 DIAGNOSIS — E11.9 TYPE 2 DIABETES MELLITUS WITHOUT COMPLICATION, WITHOUT LONG-TERM CURRENT USE OF INSULIN (HCC): Primary | ICD-10-CM

## 2019-11-07 DIAGNOSIS — I10 BENIGN ESSENTIAL HYPERTENSION: ICD-10-CM

## 2019-11-07 DIAGNOSIS — E03.9 ACQUIRED HYPOTHYROIDISM: ICD-10-CM

## 2019-11-07 DIAGNOSIS — E78.00 PURE HYPERCHOLESTEROLEMIA: ICD-10-CM

## 2019-11-07 DIAGNOSIS — R13.10 DYSPHAGIA, UNSPECIFIED TYPE: ICD-10-CM

## 2019-11-07 PROCEDURE — 99245 OFF/OP CONSLTJ NEW/EST HI 55: CPT | Performed by: INTERNAL MEDICINE

## 2019-11-07 RX ORDER — DEXAMETHASONE 1 MG
TABLET ORAL
Qty: 1 TABLET | Refills: 0 | Status: SHIPPED | OUTPATIENT
Start: 2019-11-07 | End: 2019-12-23

## 2019-11-07 NOTE — PROGRESS NOTES
11/7/2019    Assessment/Plan      Diagnoses and all orders for this visit:    Type 2 diabetes mellitus without complication, without long-term current use of insulin (HCC)  -     Hemoglobin A1C; Future  -     Comprehensive metabolic panel; Future  -     CBC and differential; Future  -     Microalbumin / creatinine urine ratio; Future  -     TSH, 3rd generation; Future  -     Insulin-like growth factor 1 (IGF-1) - Lab Collect; Future  -     Cortisol Level, AM Specimen- Lab Collect; Future  -     Dexamethasone; Future  -     dexamethasone (DECADRON) 1 mg tablet; 1 tab at 10 pm night before cortisol blood work  Benign essential hypertension    Pure hypercholesterolemia  -     Lipid Panel with Direct LDL reflex; Future    Acquired hypothyroidism  -     TSH, 3rd generation Lab Collect; Future  -     T4, free Lab Collect; Future  -     FL barium swallow; Future    Dysphagia, unspecified type  -     US thyroid; Future  -     FL barium swallow; Future        Assessment/Plan:  1  Type 2 diabetes:  Blood sugars overall look reasonable with occasional fluctuations  I did discuss that since she is checking her blood sugar more than 3 times daily and on 4 shots of insulin daily and therefore requires frequent monitoring adjustments in regimen, she would benefit from a continues glucose monitor  I offered her a professional trial and she will think about this  I also suggested she meet with medical nutrition therapy but she will prefer to hold off on this and think about this for now  Follow-up in 3 months with labs as ordered above just prior  Check 1 mg overnight dexamethasone suppression test as well as IGF-1 level  We will call with these results  2  Thyromegaly and possible nodule: This could be a pseudo nodule in the setting of background thyroiditis  I have suggested we just repeat an ultrasound in 6 months to trend      3  Hypothyroidism:  Her dose was reduced from 125 mcg to 100 mcg of levothyroxine recently  Repeat TSH and free T4 in about 3 weeks  We will call with the results  I will add an addendum to my note if at that time thyroid looks better that from a thyroid perspective she is okay for possible orthopedic surgery in the future  4  Dysphagia:  Check swallow study  I do not think this is related to her thyroid  5  Hypertension:  Normotensive in the office today on current regimen  6  Hyperlipidemia:  Check lipid panel before next appointment  CC: Diabetes Consult    History of Present Illness     HPI: Carly Morse is a 60y o  year old female with type 2 diabetes for several years  She is on insulin at home and takes NovoLog 20 units with each meal, Lantus 36 units twice a day  She denies any polyuria, polydipsia, nocturia and blurry vision  She denies neuropathy, nephropathy and retinopathy  Hypoglycemic episodes: No not recently  I provided a education sheet on hypo glycemia  The patient's last eye exam was over 1 year ago and she has this in mind to schedule a follow-up  Blood Sugar/Glucometer/Pump/CGM review:  Blood sugars checked 3 times daily show blood sugars are typically reasonably well controlled  There is occasional postprandial hyperglycemia above 200 without a consistent pattern  For hypertension, she is on valsartan-hydrochlorothiazide 160-25 mg daily  She is also on amlodipine 5 mg daily  For hyperlipidemia she continues on simvastatin 20 mg daily  For hypothyroidism she takes levothyroxine 100 mcg daily  Recent thyroid ultrasound showed a possible nodule in the right lobe  She had her dose reduced recently  Review of Systems   Constitutional: Positive for fatigue and unexpected weight change  HENT: Negative for trouble swallowing and voice change  Eyes: Negative for visual disturbance  Respiratory: Negative for shortness of breath  Cardiovascular: Negative for palpitations and leg swelling     Gastrointestinal: Positive for abdominal pain  Negative for nausea and vomiting  Endocrine: Negative for polydipsia and polyuria  Musculoskeletal: Negative for arthralgias and myalgias  Skin: Negative for rash  Neurological: Negative for dizziness, tremors and weakness  Hematological: Negative for adenopathy  Psychiatric/Behavioral: Negative for agitation and confusion         Historical Information   Past Medical History:   Diagnosis Date    History of total left knee replacement (TKR) 2013    Obesity     TMJ (dislocation of temporomandibular joint)     Trigeminal neuralgia      Past Surgical History:   Procedure Laterality Date    CARDIAC CATHETERIZATION      HYSTERECTOMY      TONSILLECTOMY      US GUIDED BREAST BIOPSY RIGHT COMPLETE Right 9/5/2018     Social History   Social History     Substance and Sexual Activity   Alcohol Use No     Social History     Substance and Sexual Activity   Drug Use No     Social History     Tobacco Use   Smoking Status Never Smoker   Smokeless Tobacco Never Used     Family History:   Family History   Problem Relation Age of Onset    Colon cancer Mother    Barby Seller Dementia Father     Depression Father     Diabetes Father     Stroke Father     Parkinsonism Father     GI problems Family         GI malignancy       Meds/Allergies   Current Outpatient Medications   Medication Sig Dispense Refill    amLODIPine (NORVASC) 5 mg tablet Take 1 tablet (5 mg total) by mouth daily 30 tablet 5    aspirin 81 MG tablet Take 1 tablet by mouth daily      diclofenac sodium (VOLTAREN) 50 mg EC tablet TAKE ONE TABLET BY MOUTH THREE TIMES A DAY 90 tablet 5    glucose blood (FREESTYLE LITE) test strip TEST FOUR TIMES A  each 5    glucose monitoring kit (FREESTYLE) monitoring kit Testing 4 times daily - DX- E11 9 1 each 0    insulin aspart (NOVOLOG FLEXPEN) 100 Units/mL injection pen Inject 20 Units under the skin 3 (three) times a day with meals Before meals or sliding 10 pen 5    Insulin Syringe-Needle U-100 (B-D INS SYR ULTRAFINE  3CC/31G) 31G X 5/16" 0 3 ML MISC by Does not apply route      LANTUS SOLOSTAR 100 units/mL injection pen 36 units twice daily 5 pen 5    levothyroxine 100 mcg tablet Take 1 tablet (100 mcg total) by mouth daily 30 tablet 11    meclizine (ANTIVERT) 12 5 MG tablet Take 1 tablet (12 5 mg total) by mouth every 8 (eight) hours as needed for dizziness or nausea Take TID for three days then prn 20 tablet 0    metoprolol tartrate (LOPRESSOR) 50 mg tablet TAKE ONE TABLET BY MOUTH TWICE A DAY 60 tablet 11    omeprazole (PriLOSEC) 20 mg delayed release capsule Take 20 mg by mouth daily      simvastatin (ZOCOR) 20 mg tablet TAKE ONE TABLET DAILY AS DIRECTED 30 tablet 11    traMADol (ULTRAM) 50 mg tablet Take 1 tablet (50 mg total) by mouth 3 (three) times a day as needed for moderate pain 90 tablet 0    valsartan-hydrochlorothiazide (DIOVAN-HCT) 160-25 MG per tablet TAKE ONE TABLET BY MOUTH EVERY DAY 30 tablet 5    dexamethasone (DECADRON) 1 mg tablet 1 tab at 10 pm night before cortisol blood work  1 tablet 0     No current facility-administered medications for this visit  Allergies   Allergen Reactions    Niacin Anaphylaxis     [facility] - [id number]: Flushing    Ciprofloxacin     Pioglitazone Hives    Cefprozil Rash    Cefuroxime Rash    Ramipril Rash       Objective   Vitals: Blood pressure 130/74, pulse 72, height 4' 11 85" (1 52 m), weight 100 kg (221 lb 3 2 oz), not currently breastfeeding  Invasive Devices     None                 Physical Exam   Constitutional: She is oriented to person, place, and time  She appears well-developed and well-nourished  No distress  HENT:   Head: Normocephalic and atraumatic  Neck: Normal range of motion  Neck supple  No thyromegaly present  Cardiovascular: Normal rate and regular rhythm  Pulmonary/Chest: Effort normal and breath sounds normal  No respiratory distress  Abdominal: Soft   Bowel sounds are normal    Musculoskeletal: Normal range of motion  She exhibits no edema  Neurological: She is alert and oriented to person, place, and time  She exhibits normal muscle tone  Skin: Skin is warm and dry  No rash noted  She is not diaphoretic  Psychiatric: She has a normal mood and affect  Her behavior is normal    Vitals reviewed  The history was obtained from the review of the chart and from the patient      Lab Results:    Most recent Alc is  Lab Results   Component Value Date    HGBA1C 7 6 (H) 09/30/2019           No components found for: HA1C  No components found for: GLU    Lab Results   Component Value Date    CREATININE 0 82 10/19/2019    CREATININE 1 03 06/30/2019    CREATININE 0 78 08/21/2018    BUN 13 10/19/2019     11/20/2014    K 4 1 10/19/2019     10/19/2019    CO2 28 10/19/2019     eGFR   Date Value Ref Range Status   10/19/2019 77 ml/min/1 73sq m Final     No components found for: [facility]    Lab Results   Component Value Date    HDL 40 10/19/2019    TRIG 123 10/19/2019       Lab Results   Component Value Date    ALT 35 10/19/2019    AST 27 10/19/2019    ALKPHOS 118 (H) 10/19/2019    BILITOT 0 5 11/20/2014       Lab Results   Component Value Date    FREET4 1 64 (H) 10/19/2019       Recent Results (from the past 72737 hour(s))   CBC and differential    Collection Time: 06/30/19 12:34 PM   Result Value Ref Range    WBC 28 67 (H) 4 31 - 10 16 Thousand/uL    RBC 4 55 3 81 - 5 12 Million/uL    Hemoglobin 12 9 11 5 - 15 4 g/dL    Hematocrit 39 1 34 8 - 46 1 %    MCV 86 82 - 98 fL    MCH 28 4 26 8 - 34 3 pg    MCHC 33 0 31 4 - 37 4 g/dL    RDW 12 9 11 6 - 15 1 %    MPV 10 7 8 9 - 12 7 fL    Platelets 277 670 - 436 Thousands/uL    nRBC 0 /100 WBCs    Neutrophils Relative 77 (H) 43 - 75 %    Immat GRANS % 1 0 - 2 %    Lymphocytes Relative 14 14 - 44 %    Monocytes Relative 8 4 - 12 %    Eosinophils Relative 0 0 - 6 %    Basophils Relative 0 0 - 1 %    Neutrophils Absolute 21 95 (H) 1 85 - 7 62 Thousands/µL Immature Grans Absolute 0 26 (H) 0 00 - 0 20 Thousand/uL    Lymphocytes Absolute 3 98 0 60 - 4 47 Thousands/µL    Monocytes Absolute 2 33 (H) 0 17 - 1 22 Thousand/µL    Eosinophils Absolute 0 06 0 00 - 0 61 Thousand/µL    Basophils Absolute 0 09 0 00 - 0 10 Thousands/µL   Comprehensive metabolic panel    Collection Time: 06/30/19 12:34 PM   Result Value Ref Range    Sodium 134 (L) 136 - 145 mmol/L    Potassium 3 6 3 5 - 5 3 mmol/L    Chloride 97 (L) 100 - 108 mmol/L    CO2 30 21 - 32 mmol/L    ANION GAP 7 4 - 13 mmol/L    BUN 13 5 - 25 mg/dL    Creatinine 1 03 0 60 - 1 30 mg/dL    Glucose 215 (H) 65 - 140 mg/dL    Calcium 9 3 8 3 - 10 1 mg/dL    AST 10 5 - 45 U/L    ALT 19 12 - 78 U/L    Alkaline Phosphatase 121 (H) 46 - 116 U/L    Total Protein 7 9 6 4 - 8 2 g/dL    Albumin 3 3 (L) 3 5 - 5 0 g/dL    Total Bilirubin 1 50 (H) 0 20 - 1 00 mg/dL    eGFR 58 ml/min/1 73sq m   UA w Reflex to Microscopic w Reflex to Culture    Collection Time: 06/30/19 12:34 PM   Result Value Ref Range    Color, UA Brianna     Clarity, UA Cloudy     Specific [place], UA 1 025 1 003 - 1 030    pH, UA 5 5 4 5, 5 0, 5 5, 6 0, 6 5, 7 0, 7 5, 8 0    Leukocytes, UA Small (A) Negative    Nitrite, UA Negative Negative    Protein, UA Negative Negative mg/dl    Glucose, UA Negative Negative mg/dl    Ketones, UA Negative Negative mg/dl    Urobilinogen, UA 1 0 0 2, 1 0 E U /dl E U /dl    Bilirubin, UA Interference- unable to analyze (A) Negative    Blood, UA Moderate (A) Negative   Urine Microscopic    Collection Time: 06/30/19 12:34 PM   Result Value Ref Range    RBC, UA 4-10 (A) None Seen, 0-5 /hpf    WBC, UA Innumerable (A) None Seen, 0-5, 5-55, 5-65 /hpf    Epithelial Cells Innumerable (A) None Seen, Occasional /hpf    Bacteria, UA Innumerable (A) None Seen, Occasional /hpf    OTHER OBSERVATIONS WBCs Clumped    Urine culture    Collection Time: 06/30/19 12:34 PM   Result Value Ref Range    Urine Culture >100,000 cfu/ml Escherichia coli (A) Susceptibility    Escherichia coli - JEANNA     ZID Performed Yes       Amikacin ($$) <=16 Susceptible ug/ml     Amoxicillin + Clavulanate 16/8 Intermediate ug/ml     Ampicillin ($$) >16 00 Resistant ug/ml     Ampicillin + Sulbactam ($) >16/8 Resistant ug/ml     Aztreonam ($$$)  <=4 Susceptible ug/ml     Cefazolin ($) 16 00 Intermediate ug/ml     Cefuroxime ($$) <=4 Susceptible ug/ml     Ciprofloxacin ($)  <=1 00 Susceptible ug/ml     Ertapenem ($$$) <=0 5 Susceptible ug/ml     Gentamicin ($$) >8 Resistant ug/ml     Levofloxacin ($) <=0 25 Susceptible ug/ml     Nitrofurantoin <=32 Susceptible ug/ml     Tetracycline >8 Resistant ug/ml     Tobramycin ($) 4 Susceptible ug/ml     Trimethoprim + Sulfamethoxazole ($$$) >2/38 Resistant ug/ml   Wound culture and Gram stain    Collection Time: 06/30/19  2:46 PM   Result Value Ref Range    Wound Culture 2+ Growth of Staphylococcus epidermidis (A)     Gram Stain Result No polys seen     Gram Stain Result No bacteria seen        Susceptibility    Staphylococcus epidermidis - JEANNA     ZID Performed Yes       Ampicillin ($$) <=2 00 Resistant ug/ml     Cefazolin ($) <=4 00 Susceptible ug/ml     Clindamycin ($) <=0 25 Susceptible ug/ml     Erythromycin ($$$$) <=0 25 Susceptible ug/ml     Gentamicin ($$) <=1 Susceptible ug/ml     Oxacillin <=0 25 Susceptible ug/ml     Tetracycline <=2 Susceptible ug/ml     Trimethoprim + Sulfamethoxazole ($$$) <=0 5/9 5 Susceptible ug/ml     Vancomycin ($) 1 00 Susceptible ug/ml   Hemoglobin A1C    Collection Time: 09/30/19  6:19 PM   Result Value Ref Range    Hemoglobin A1C 7 6 (H) 4 2 - 6 3 %     mg/dl   TSH, 3rd generation    Collection Time: 09/30/19  6:19 PM   Result Value Ref Range    TSH 3RD GENERATON 0 098 (L) 0 358 - 3 740 uIU/mL   Comprehensive metabolic panel    Collection Time: 10/19/19 11:52 AM   Result Value Ref Range    Sodium 139 136 - 145 mmol/L    Potassium 4 1 3 5 - 5 3 mmol/L    Chloride 103 100 - 108 mmol/L    CO2 28 21 - 32 mmol/L    ANION GAP 8 4 - 13 mmol/L    BUN 13 5 - 25 mg/dL    Creatinine 0 82 0 60 - 1 30 mg/dL    Glucose, Fasting 101 (H) 65 - 99 mg/dL    Calcium 8 8 8 3 - 10 1 mg/dL    AST 27 5 - 45 U/L    ALT 35 12 - 78 U/L    Alkaline Phosphatase 118 (H) 46 - 116 U/L    Total Protein 8 1 6 4 - 8 2 g/dL    Albumin 3 6 3 5 - 5 0 g/dL    Total Bilirubin 0 70 0 20 - 1 00 mg/dL    eGFR 77 ml/min/1 73sq m   Lipid Panel with Direct LDL reflex    Collection Time: 10/19/19 11:52 AM   Result Value Ref Range    Cholesterol 131 50 - 200 mg/dL    Triglycerides 123 <=150 mg/dL    HDL, Direct 40 40 - 60 mg/dL    LDL Calculated 66 0 - 100 mg/dL   T4, free    Collection Time: 10/19/19 11:52 AM   Result Value Ref Range    Free T4 1 64 (H) 0 76 - 1 46 ng/dL   Microalbumin / creatinine urine ratio    Collection Time: 10/19/19 11:57 AM   Result Value Ref Range    Creatinine, Ur 66 4 mg/dL    Microalbum  ,U,Random 5 0 0 0 - 20 0 mg/L    Microalb Creat Ratio 8 0 - 30 mg/g creatinine         Future Appointments   Date Time Provider [place]   11/13/2019  9:30 AM Melvin Spann MD [facility]   1/30/2020 10:30 AM Becky Alexis MD [facility]       Portions of the record may have been created with voice recognition software  Occasional wrong word or "sound a like" substitutions may have occurred due to the inherent limitations of voice recognition software  Read the chart carefully and recognize, using context, where substitutions have occurred

## 2019-11-14 DIAGNOSIS — E11.9 TYPE 2 DIABETES MELLITUS WITHOUT COMPLICATION, WITHOUT LONG-TERM CURRENT USE OF INSULIN (HCC): ICD-10-CM

## 2019-11-14 RX ORDER — INSULIN GLARGINE 100 [IU]/ML
INJECTION, SOLUTION SUBCUTANEOUS
Qty: 15 ML | Refills: 5 | OUTPATIENT
Start: 2019-11-14

## 2019-11-14 RX ORDER — INSULIN GLARGINE 100 [IU]/ML
INJECTION, SOLUTION SUBCUTANEOUS
Qty: 5 PEN | Refills: 5 | Status: SHIPPED | OUTPATIENT
Start: 2019-11-14 | End: 2019-12-13 | Stop reason: SDUPTHER

## 2019-11-19 ENCOUNTER — OFFICE VISIT (OUTPATIENT)
Dept: OBGYN CLINIC | Facility: CLINIC | Age: 64
End: 2019-11-19
Payer: COMMERCIAL

## 2019-11-19 ENCOUNTER — TELEPHONE (OUTPATIENT)
Dept: OBGYN CLINIC | Facility: OTHER | Age: 64
End: 2019-11-19

## 2019-11-19 VITALS
BODY MASS INDEX: 40.97 KG/M2 | HEIGHT: 61 IN | DIASTOLIC BLOOD PRESSURE: 74 MMHG | SYSTOLIC BLOOD PRESSURE: 116 MMHG | HEART RATE: 80 BPM | WEIGHT: 217 LBS

## 2019-11-19 DIAGNOSIS — G89.29 CHRONIC PAIN OF RIGHT KNEE: ICD-10-CM

## 2019-11-19 DIAGNOSIS — M17.11 PRIMARY OSTEOARTHRITIS OF RIGHT KNEE: Primary | ICD-10-CM

## 2019-11-19 DIAGNOSIS — M25.561 CHRONIC PAIN OF RIGHT KNEE: ICD-10-CM

## 2019-11-19 PROCEDURE — 99213 OFFICE O/P EST LOW 20 MIN: CPT | Performed by: ORTHOPAEDIC SURGERY

## 2019-11-19 NOTE — TELEPHONE ENCOUNTER
Patient calling to know when her surgery is scheduled for because she forgot   Please call her back with the date

## 2019-11-19 NOTE — PROGRESS NOTES
Assessment:     1  Primary osteoarthritis of right knee    2  Chronic pain of right knee        Plan:     Problem List Items Addressed This Visit        Musculoskeletal and Integument    Osteoarthritis of right knee - Primary      Other Visit Diagnoses     Chronic pain of right knee              Right knee osteoarthritis  Conservative versus surgical treatment options were reviewed  Patient also understand affect of her weight on surgical outcome  She is at the high range of acceptable weight in undergoing knee replacement surgery  Ector Deng wishes to proceed with right total knee arthroplasty  They understand risks of surgery to include but not limited to bleeding, infection, possible persistent symptoms, possible need for reoperation  They will need medical clearance and blood work  The above stated was discussed in layman's terms and the patient expressed understanding  All questions were answered to the patient's satisfaction  Alternatives were reviewed  Post-operative expectations were discussed  Follow up in December  Written consent will be obtained at that time  Subjective:     Patient ID: Curry Lawton is a 60 y o  female  Chief Complaint:  Ector Deng is a 60year old female who presents to the office for second opinion of right knee pain ongoing for multiple years  She has been seen by multiple providers in the past, scheduled for surgery and subsequently cancelled due to her ill   She is experiencing global right knee pain made worse with activities  Her right knee has started to give out on her  She has tried anti-inflammatories to control her pain  She has history of left total knee arthroplasty in 2014  She did not get relief from injections into her left knee and does not want to have injections with her right knee  She also has difficulty doing exercises due to the pain  She is interested in discussing surgical intervention for her right knee due to her limitations       Ector Deng has history of type 2 diabetes and thyroid pathology  Information on patient's intake form was reviewed  Allergy:  Allergies   Allergen Reactions    Niacin Anaphylaxis     [facility] - [id number]: Flushing    Ciprofloxacin     Pioglitazone Hives    Cefprozil Rash    Cefuroxime Rash    Ramipril Rash     Medications:  all current active meds have been reviewed  Past Medical History:  Past Medical History:   Diagnosis Date    History of total left knee replacement (TKR) 2013    Obesity     TMJ (dislocation of temporomandibular joint)     Trigeminal neuralgia      Past Surgical History:  Past Surgical History:   Procedure Laterality Date    CARDIAC CATHETERIZATION      HYSTERECTOMY      TONSILLECTOMY      US GUIDED BREAST BIOPSY RIGHT COMPLETE Right 9/5/2018     Family History:  Family History   Problem Relation Age of Onset    Colon cancer Mother    Elijah Bones Dementia Father     Depression Father     Diabetes Father     Stroke Father     Parkinsonism Father     GI problems Family         GI malignancy     Social History:  Social History     Substance and Sexual Activity   Alcohol Use No     Social History     Substance and Sexual Activity   Drug Use No     Social History     Tobacco Use   Smoking Status Never Smoker   Smokeless Tobacco Never Used     Review of Systems   Constitutional: Negative for fever and unexpected weight change  HENT: Negative for hearing loss, nosebleeds and sore throat  Eyes: Negative for pain, redness and visual disturbance  Respiratory: Negative for cough, shortness of breath and wheezing  Cardiovascular: Negative for chest pain, palpitations and leg swelling  Gastrointestinal: Negative for abdominal pain, nausea and vomiting  Endocrine: Negative for polydipsia and polyuria  Genitourinary: Negative for dysuria and hematuria  Musculoskeletal: Positive for arthralgias, gait problem (Antalgic) and joint swelling (Right knee)  Skin: Negative for rash and wound  Neurological: Negative for dizziness and headaches  Psychiatric/Behavioral: Negative for agitation and suicidal ideas  Objective:  BP Readings from Last 1 Encounters:   11/19/19 116/74      Wt Readings from Last 1 Encounters:   11/19/19 98 4 kg (217 lb)      BMI:   Estimated body mass index is 41 kg/m² as calculated from the following:    Height as of this encounter: 5' 1" (1 549 m)  Weight as of this encounter: 98 4 kg (217 lb)  BSA:   Estimated body surface area is 1 96 meters squared as calculated from the following:    Height as of this encounter: 5' 1" (1 549 m)  Weight as of this encounter: 98 4 kg (217 lb)  Physical Exam   Constitutional: She is oriented to person, place, and time  She appears well-developed and well-nourished  HENT:   Head: Normocephalic and atraumatic  Eyes: Conjunctivae and EOM are normal    Neck: Neck supple  Cardiovascular: Intact distal pulses  Pulmonary/Chest: Effort normal    Musculoskeletal:        Right knee: She exhibits effusion (trace)  Left knee: She exhibits no effusion  Neurological: She is alert and oriented to person, place, and time  Skin: Skin is warm and dry  Psychiatric: She has a normal mood and affect  Her behavior is normal      Right Knee Exam     Muscle Strength   The patient has normal right knee strength  Tenderness   Right knee tenderness location: Diffuse  Range of Motion   Extension: -5   Flexion: 120     Tests   Tashia:  Medial - positive Lateral - positive  Varus: negative Valgus: negative  Patellar apprehension: negative    Other   Erythema: absent  Sensation: normal  Pulse: present  Swelling: none  Effusion: effusion (trace) present    Comments:  Varus alignment  Crepitus present      Left Knee Exam     Muscle Strength   The patient has normal left knee strength  Tenderness   The patient is experiencing no tenderness  Range of Motion   The patient has normal left knee ROM      Tests   Varus: negative Valgus: negative  Patellar apprehension: negative    Other   Erythema: absent  Scars: present (Healed)  Sensation: normal  Pulse: present  Swelling: none  Effusion: no effusion present            I have personally reviewed pertinent films in PACS and my interpretation is Right knee x-ray show advanced osteoarthritis with medial joint narrowing  Varus alignment  Marginal osteophyte      Scribe Attestation    I,:   Yuri Chavez am acting as a scribe while in the presence of the attending physician :        I,:   Elio Junior MD personally performed the services described in this documentation    as scribed in my presence :

## 2019-12-12 ENCOUNTER — TELEPHONE (OUTPATIENT)
Dept: FAMILY MEDICINE CLINIC | Facility: HOSPITAL | Age: 64
End: 2019-12-12

## 2019-12-13 DIAGNOSIS — E11.9 TYPE 2 DIABETES MELLITUS WITHOUT COMPLICATION, WITHOUT LONG-TERM CURRENT USE OF INSULIN (HCC): ICD-10-CM

## 2019-12-13 RX ORDER — INSULIN GLARGINE 100 [IU]/ML
INJECTION, SOLUTION SUBCUTANEOUS
Qty: 5 PEN | Refills: 0 | Status: SHIPPED | OUTPATIENT
Start: 2019-12-13 | End: 2020-01-15

## 2019-12-23 ENCOUNTER — OFFICE VISIT (OUTPATIENT)
Dept: FAMILY MEDICINE CLINIC | Facility: HOSPITAL | Age: 64
End: 2019-12-23
Payer: COMMERCIAL

## 2019-12-23 VITALS
SYSTOLIC BLOOD PRESSURE: 122 MMHG | HEART RATE: 71 BPM | BODY MASS INDEX: 41.54 KG/M2 | HEIGHT: 61 IN | WEIGHT: 220 LBS | DIASTOLIC BLOOD PRESSURE: 78 MMHG

## 2019-12-23 DIAGNOSIS — Z01.818 PRE-OP TESTING: Primary | ICD-10-CM

## 2019-12-23 DIAGNOSIS — E11.9 TYPE 2 DIABETES MELLITUS WITHOUT COMPLICATION, WITHOUT LONG-TERM CURRENT USE OF INSULIN (HCC): ICD-10-CM

## 2019-12-23 DIAGNOSIS — I10 BENIGN ESSENTIAL HYPERTENSION: ICD-10-CM

## 2019-12-23 DIAGNOSIS — E78.00 PURE HYPERCHOLESTEROLEMIA: ICD-10-CM

## 2019-12-23 DIAGNOSIS — M17.11 PRIMARY OSTEOARTHRITIS OF RIGHT KNEE: ICD-10-CM

## 2019-12-23 PROCEDURE — 93000 ELECTROCARDIOGRAM COMPLETE: CPT | Performed by: INTERNAL MEDICINE

## 2019-12-23 PROCEDURE — 99243 OFF/OP CNSLTJ NEW/EST LOW 30: CPT | Performed by: INTERNAL MEDICINE

## 2019-12-23 NOTE — PROGRESS NOTES
[id number]  Internal Medicine      PREOPERATIVE EVALUATION     Tushar Salomon is a 60 y o  female who presents to the office today for a preoperative consultation at the request of surgeon Dr Donaldo Farris who plans on performing R TKR on January 20  Prior anesthesia adverse reactions - None    Exercise capacity - Able to walk 4 blocks or climb 2 flights of stairs without symptoms - No limited by orthopedic issues    Easy bleeding/bruising - No    Chest pain - No    Dyspnea, wheezing, cough - No    Sleep apnea - No    HPI:-Pt has symptomatic R knee arthritis  Had previous TKR left  DM-this is under better control  Is insulin dependent  Hypertension  Patient is here for follow-up of elevated blood pressure  She is  adherent to a low-salt diet  Patient denies headache, dizziness or lightheadedness Cardiovascular risk factors: diabetes mellitus and dyslipidemia  History of target organ damage: none  Historical Information   Past Medical History:   Diagnosis Date    History of total left knee replacement (TKR) 2013    Obesity     TMJ (dislocation of temporomandibular joint)     Trigeminal neuralgia        Past Surgical History:   Procedure Laterality Date    CARDIAC CATHETERIZATION      HYSTERECTOMY      TONSILLECTOMY      US GUIDED BREAST BIOPSY RIGHT COMPLETE Right 9/5/2018     Social History     Social History     Tobacco Use   Smoking Status Never Smoker   Smokeless Tobacco Never Used       Family History:   Family History   Problem Relation Age of Onset    Colon cancer Mother    Jeral Hose Dementia Father     Depression Father     Diabetes Father     Stroke Father     Parkinsonism Father     GI problems Family         GI malignancy         Review of Systems   Cardiovascular: Negative  Gastrointestinal: Negative  Genitourinary: Negative  All other systems reviewed and are negative          Objective      /78 (BP Location: Left arm, Patient Position: Sitting, Cuff Size: Standard)   Pulse 71   Ht 5' 1" (1 549 m)   Wt 99 8 kg (220 lb)   LMP  (LMP Unknown)   BMI 41 57 kg/m²     Physical Exam   Constitutional: She appears well-developed and well-nourished  HENT:   Head: Normocephalic and atraumatic  Neck: No JVD present  Thyromegaly present  Cardiovascular: Normal rate and regular rhythm  Pulmonary/Chest: Effort normal and breath sounds normal    Abdominal: Soft  Bowel sounds are normal  She exhibits no distension  Musculoskeletal: She exhibits no edema  Sign DJD of R knee  Skin: Skin is warm and dry  Vitals reviewed  Cardiographics  ECG: normal sinus rhythm, no blocks or conduction defects, no ischemic changes      Imaging  Chest x-ray: NI      No visits with results within 2 Week(s) from this visit  Latest known visit with results is:   Lab on 10/19/2019   Component Date Value Ref Range Status    Sodium 10/19/2019 139  136 - 145 mmol/L Final    Potassium 10/19/2019 4 1  3 5 - 5 3 mmol/L Final    Chloride 10/19/2019 103  100 - 108 mmol/L Final    CO2 10/19/2019 28  21 - 32 mmol/L Final    ANION GAP 10/19/2019 8  4 - 13 mmol/L Final    BUN 10/19/2019 13  5 - 25 mg/dL Final    Creatinine 10/19/2019 0 82  0 60 - 1 30 mg/dL Final    Standardized to IDMS reference method    Glucose, Fasting 10/19/2019 101* 65 - 99 mg/dL Final      Specimen collection should occur prior to Sulfasalazine administration due to the potential for falsely depressed results  Specimen collection should occur prior to Sulfapyridine administration due to the potential for falsely elevated results   Calcium 10/19/2019 8 8  8 3 - 10 1 mg/dL Final    AST 10/19/2019 27  5 - 45 U/L Final      Specimen collection should occur prior to Sulfasalazine administration due to the potential for falsely depressed results       ALT 10/19/2019 35  12 - 78 U/L Final      Specimen collection should occur prior to Sulfasalazine administration due to the potential for falsely depressed results   Alkaline Phosphatase 10/19/2019 118* 46 - 116 U/L Final    Total Protein 10/19/2019 8 1  6 4 - 8 2 g/dL Final    Albumin 10/19/2019 3 6  3 5 - 5 0 g/dL Final    Total Bilirubin 10/19/2019 0 70  0 20 - 1 00 mg/dL Final    eGFR 10/19/2019 77  ml/min/1 73sq m Final    Cholesterol 10/19/2019 131  50 - 200 mg/dL Final      Cholesterol:       Desirable         <200 mg/dl       Borderline         200-239 mg/dl       High              >239           Triglycerides 10/19/2019 123  <=150 mg/dL Final      Triglyceride:     Normal          <150 mg/dl     Borderline High 150-199 mg/dl     High            200-499 mg/dl        Very High       >499 mg/dl    Specimen collection should occur prior to N-Acetylcysteine or Metamizole administration due to the potential for falsely depressed results   HDL, Direct 10/19/2019 40  40 - 60 mg/dL Final      HDL Cholesterol:       High    >60 mg/dL       Low     <41 mg/dL  Specimen collection should occur prior to Metamizole administration due to the potential for falsley depressed results   LDL Calculated 10/19/2019 66  0 - 100 mg/dL Final      LDL Cholesterol:     Optimal           <100 mg/dl     Near Optimal      100-129 mg/dl     Above Optimal       Borderline High 130-159 mg/dl       High            160-189 mg/dl       Very High       >189 mg/dl         This screening LDL is a calculated result  It does not have the accuracy of the Direct Measured LDL in the monitoring of patients with hyperlipidemia and/or statin therapy  Direct Measure LDL (BGN486) must be ordered separately in these patients   Free T4 10/19/2019 1 64* 0 76 - 1 46 ng/dL Final      Specimen collection should occur prior to Sulfasalazine administration due to the potential for falsely elevated results  Lab Review   No visits with results within 2 Month(s) from this visit     Latest known visit with results is:   Lab on 10/19/2019   Component Date Value    Sodium 10/19/2019 139  Potassium 10/19/2019 4 1     Chloride 10/19/2019 103     CO2 10/19/2019 28     ANION GAP 10/19/2019 8     BUN 10/19/2019 13     Creatinine 10/19/2019 0 82     Glucose, Fasting 10/19/2019 101*    Calcium 10/19/2019 8 8     AST 10/19/2019 27     ALT 10/19/2019 35     Alkaline Phosphatase 10/19/2019 118*    Total Protein 10/19/2019 8 1     Albumin 10/19/2019 3 6     Total Bilirubin 10/19/2019 0 70     eGFR 10/19/2019 77     Cholesterol 10/19/2019 131     Triglycerides 10/19/2019 123     HDL, Direct 10/19/2019 40     LDL Calculated 10/19/2019 66     Free T4 10/19/2019 1 64*        Current BW pending  Diagnoses and all orders for this visit:    Pre-op testing  -     POCT ECG    Primary osteoarthritis of right knee    Benign essential hypertension    Pure hypercholesterolemia    Type 2 diabetes mellitus without complication, without long-term current use of insulin (HCC)                 Plan:         Surgical Clearance - Cleared    Risk - low-mod    Discussion/Summary: med and insulin instruction given    Lantus 15 units will be given the morning of surgery with no short-acting insulin  Metoprolol will be given with a sip of water             Aaron Armstrong MD

## 2019-12-23 NOTE — PATIENT INSTRUCTIONS
Stop diclofenac 1 week before surgery    The morning of surgery take 15 units of Lantus no other insulin  Only other medication to take his metoprolol with a sip of water  Let my office know when you have your pre-admission blood work done

## 2019-12-26 ENCOUNTER — OFFICE VISIT (OUTPATIENT)
Dept: OBGYN CLINIC | Facility: CLINIC | Age: 64
End: 2019-12-26
Payer: COMMERCIAL

## 2019-12-26 ENCOUNTER — PREP FOR PROCEDURE (OUTPATIENT)
Dept: OBGYN CLINIC | Facility: CLINIC | Age: 64
End: 2019-12-26

## 2019-12-26 VITALS
DIASTOLIC BLOOD PRESSURE: 70 MMHG | HEIGHT: 61 IN | BODY MASS INDEX: 41.54 KG/M2 | SYSTOLIC BLOOD PRESSURE: 130 MMHG | WEIGHT: 220 LBS

## 2019-12-26 DIAGNOSIS — M17.11 PRIMARY OSTEOARTHRITIS OF RIGHT KNEE: Primary | ICD-10-CM

## 2019-12-26 PROCEDURE — 99214 OFFICE O/P EST MOD 30 MIN: CPT | Performed by: ORTHOPAEDIC SURGERY

## 2019-12-26 RX ORDER — ASCORBIC ACID 500 MG
500 TABLET ORAL 2 TIMES DAILY
Qty: 60 TABLET | Refills: 0 | Status: SHIPPED | OUTPATIENT
Start: 2019-12-26 | End: 2022-05-12

## 2019-12-26 RX ORDER — FERROUS SULFATE TAB EC 324 MG (65 MG FE EQUIVALENT) 324 (65 FE) MG
324 TABLET DELAYED RESPONSE ORAL
Qty: 60 TABLET | Refills: 0 | Status: SHIPPED | OUTPATIENT
Start: 2019-12-26 | End: 2021-06-21

## 2019-12-26 RX ORDER — FOLIC ACID 1 MG/1
1 TABLET ORAL DAILY
Qty: 30 TABLET | Refills: 0 | Status: SHIPPED | OUTPATIENT
Start: 2019-12-26 | End: 2022-01-14

## 2019-12-26 RX ORDER — CHLORHEXIDINE GLUCONATE 0.12 MG/ML
15 RINSE ORAL ONCE
Status: CANCELLED | OUTPATIENT
Start: 2019-12-26 | End: 2019-12-26

## 2019-12-26 RX ORDER — CHLORHEXIDINE GLUCONATE 4 G/100ML
SOLUTION TOPICAL DAILY PRN
Status: CANCELLED | OUTPATIENT
Start: 2019-12-26

## 2019-12-26 RX ORDER — SODIUM CHLORIDE, SODIUM LACTATE, POTASSIUM CHLORIDE, CALCIUM CHLORIDE 600; 310; 30; 20 MG/100ML; MG/100ML; MG/100ML; MG/100ML
75 INJECTION, SOLUTION INTRAVENOUS CONTINUOUS
Status: CANCELLED | OUTPATIENT
Start: 2019-12-26

## 2019-12-26 NOTE — PROGRESS NOTES
Assessment:     1  Primary osteoarthritis of right knee        Plan:     Problem List Items Addressed This Visit        Musculoskeletal and Integument    Primary osteoarthritis of right knee - Primary     Findings consistent with advanced right knee osteoarthritis with varus alignment  Discussed findings and treatment options with the patient  I have discussed patient conservative versus surgical intervention  Patient elected to proceed with surgical treatment with right total knee arthroplasty, cemented  Will need to do preop testing to make sure that her A1c level is less than 7  Patient's BMI is 41 57 kg/m2 which she understands that there is increased complication rate associated with high BP MRI  Patient has been try to lose weight but has not been able to  She also has been try to do exercises but her knee pain has been to severe  We will schedule patient as a same-day admit surgery  All patient's questions were answered to her satisfaction  This note is created using dictation transcription  It may contain typographical errors, grammatical errors, improperly dictated words, background noise and other errors      Discussed with patient surgical risks and complications including but not limited to infection, persistent pain, nerve and vessel injury, complications associated with anesthesia, DVT, etc          Relevant Medications    ascorbic acid (VITAMIN C) 500 mg tablet    ferrous sulfate 324 (65 Fe) mg    folic acid (FOLVITE) 1 mg tablet    Other Relevant Orders    Comprehensive metabolic panel    Hemoglobin A1C W/EAG Estimation    CBC and differential    Iron Panel (Includes Iron Saturation, Iron, and TIBC)    C-reactive protein    Protime-INR    APTT    Type and screen    Ambulatory referral to Family Practice    Ambulatory referral to Physical Therapy    Case request operating room: ARTHROPLASTY KNEE TOTAL, RIGHT (Completed)    EKG 12 lead         Subjective:     Patient ID: Chase Sykindra thompson 60 y o  female  Chief Complaint:  72-year-old female follow-up right knee osteoarthritis  Patient continued to complain of pain in her right knee with her daily activities  She is here to discuss surgical treatment with knee replacement  Patient does not want conservative treatment since she had gone through similar issue with her left knee and eventually had a knee replacement  Allergy:  Allergies   Allergen Reactions    Niacin Anaphylaxis     Annotation - [id number]: Flushing    Ciprofloxacin     Pioglitazone Hives    Cefprozil Rash    Cefuroxime Rash    Ramipril Rash     Medications:  all current active meds have been reviewed  Past Medical History:  Past Medical History:   Diagnosis Date    History of total left knee replacement (TKR) 2013    Obesity     TMJ (dislocation of temporomandibular joint)     Trigeminal neuralgia      Past Surgical History:  Past Surgical History:   Procedure Laterality Date    CARDIAC CATHETERIZATION      HYSTERECTOMY      TONSILLECTOMY      US GUIDED BREAST BIOPSY RIGHT COMPLETE Right 9/5/2018     Family History:  Family History   Problem Relation Age of Onset    Colon cancer Mother    [facility] Dementia Father     Depression Father     Diabetes Father     Stroke Father     Parkinsonism Father     GI problems Family         GI malignancy     Social History:  Social History     Substance and Sexual Activity   Alcohol Use No     Social History     Substance and Sexual Activity   Drug Use No     Social History     Tobacco Use   Smoking Status Never Smoker   Smokeless Tobacco Never Used     Review of Systems   Constitutional: Negative  HENT: Negative  Eyes: Negative  Respiratory: Negative  Cardiovascular: Negative  Gastrointestinal: Negative  Endocrine: Negative  Genitourinary: Negative  Musculoskeletal: Positive for arthralgias (Right knee), gait problem (Antalgic) and joint swelling (Right knee)  Skin: Negative      Allergic/Immunologic: Negative  Hematological: Negative  Psychiatric/Behavioral: Negative  Objective:  BP Readings from Last 1 Encounters:   12/26/19 130/70      Wt Readings from Last 1 Encounters:   12/26/19 99 8 kg (220 lb)      BMI:   Estimated body mass index is 41 57 kg/m² as calculated from the following:    Height as of this encounter: 5' 1" (1 549 m)  Weight as of this encounter: 99 8 kg (220 lb)  BSA:   Estimated body surface area is 1 97 meters squared as calculated from the following:    Height as of this encounter: 5' 1" (1 549 m)  Weight as of this encounter: 99 8 kg (220 lb)  Physical Exam   Constitutional: She is oriented to person, place, and time  She appears well-developed  HENT:   Head: Normocephalic and atraumatic  Eyes: Pupils are equal, round, and reactive to light  Conjunctivae and EOM are normal    Neck: Normal range of motion  Neck supple  Cardiovascular: Regular rhythm and intact distal pulses  Exam reveals no gallop  No murmur heard  Pulmonary/Chest: Breath sounds normal  She has no wheezes  She has no rales  Abdominal: Soft  Musculoskeletal:        Right knee: She exhibits effusion (Grade 1)  Neurological: She is alert and oriented to person, place, and time  Skin: Skin is warm  Psychiatric: She has a normal mood and affect  Nursing note and vitals reviewed  Right Knee Exam     Muscle Strength   The patient has normal right knee strength  Tenderness   The patient is experiencing tenderness in the medial joint line  Range of Motion   Extension: normal   Flexion: 110     Tests   Tashia:  Medial - positive Lateral - negative  Varus: negative Valgus: negative  Patellar apprehension: negative    Other   Erythema: absent  Sensation: normal  Pulse: present  Swelling: mild  Effusion: effusion (Grade 1) present    Comments:  Crepitation in patellofemoral joint with knee motion    Varus alignment            I have personally reviewed pertinent films in PACS and my interpretation is Right knee x-ray done on 9/30/2019 show advanced osteoarthritis with medial joint narrowing and large marginal osteophyte  Varus alignment

## 2019-12-26 NOTE — ASSESSMENT & PLAN NOTE
Findings consistent with advanced right knee osteoarthritis with varus alignment  Discussed findings and treatment options with the patient  I have discussed patient conservative versus surgical intervention  Patient elected to proceed with surgical treatment with right total knee arthroplasty, cemented  Will need to do preop testing to make sure that her A1c level is less than 7  Patient's BMI is 41 57 kg/m2 which she understands that there is increased complication rate associated with high BP MRI  Patient has been try to lose weight but has not been able to  She also has been try to do exercises but her knee pain has been to severe  We will schedule patient as a same-day admit surgery  All patient's questions were answered to her satisfaction  This note is created using dictation transcription  It may contain typographical errors, grammatical errors, improperly dictated words, background noise and other errors      Discussed with patient surgical risks and complications including but not limited to infection, persistent pain, nerve and vessel injury, complications associated with anesthesia, DVT, etc

## 2020-01-06 ENCOUNTER — TELEPHONE (OUTPATIENT)
Dept: OBGYN CLINIC | Facility: HOSPITAL | Age: 65
End: 2020-01-06

## 2020-01-06 NOTE — TELEPHONE ENCOUNTER
Asim   [phone]      Dr Trinh Jansen    Patient is calling to cancel surgery 1/20   just got off life support, so she will call to reschedule when everything settles down

## 2020-01-14 ENCOUNTER — TELEPHONE (OUTPATIENT)
Dept: OBGYN CLINIC | Facility: HOSPITAL | Age: 65
End: 2020-01-14

## 2020-01-14 NOTE — TELEPHONE ENCOUNTER
Patient contacted today as patient has previously postpone surgery for 1/20 due to personal reasons at home with the help of her   Patient's BMI is greater than 40 and patient was contacted today to 2 educated on our goal of a BMI of 40 and allowing her to do so before she decides to reschedule surgery  I did offer the patient a nonsurgical weight management referral   Patient refused that referral and states she is fine    Patient and I did discuss the goal weight of 211 lb would be a BMI of less than 40  Patient reports she is unaware if she will proceed to surgery at this point but was advised to contact me with any questions, concerns, or issues

## 2020-01-15 DIAGNOSIS — E11.9 TYPE 2 DIABETES MELLITUS WITHOUT COMPLICATION, WITHOUT LONG-TERM CURRENT USE OF INSULIN (HCC): ICD-10-CM

## 2020-01-15 RX ORDER — INSULIN GLARGINE 100 [IU]/ML
INJECTION, SOLUTION SUBCUTANEOUS
Qty: 15 ML | Refills: 0 | Status: SHIPPED | OUTPATIENT
Start: 2020-01-15 | End: 2020-02-25

## 2020-01-31 DIAGNOSIS — M17.0 PRIMARY OSTEOARTHRITIS OF BOTH KNEES: ICD-10-CM

## 2020-01-31 RX ORDER — TRAMADOL HYDROCHLORIDE 50 MG/1
50 TABLET ORAL 3 TIMES DAILY PRN
Qty: 90 TABLET | Refills: 0 | Status: SHIPPED | OUTPATIENT
Start: 2020-01-31 | End: 2020-12-21 | Stop reason: SDUPTHER

## 2020-01-31 NOTE — TELEPHONE ENCOUNTER
Everything needs to be done except PT/PTT-but good to get them in case surgery in near future   Most BW is for endocrine

## 2020-01-31 NOTE — TELEPHONE ENCOUNTER
Dr Vivian Foreman just wanted her to have endo blood work done  Labs re printed and given to [place]  She will call patient to let her know

## 2020-01-31 NOTE — TELEPHONE ENCOUNTER
HEATHER - CAN YOU PRINT OUT THE LABS EFRAIN NEEDS - I DON'T WANT TO GIVE HER THE WRONG LABS - LET ME KNOW AND I WILL TELL HER TO      [address]

## 2020-02-25 DIAGNOSIS — E11.9 TYPE 2 DIABETES MELLITUS WITHOUT COMPLICATION, WITHOUT LONG-TERM CURRENT USE OF INSULIN (HCC): ICD-10-CM

## 2020-02-25 RX ORDER — INSULIN GLARGINE 100 [IU]/ML
INJECTION, SOLUTION SUBCUTANEOUS
Qty: 15 ML | Refills: 0 | Status: SHIPPED | OUTPATIENT
Start: 2020-02-25 | End: 2020-03-30

## 2020-03-29 DIAGNOSIS — E11.9 TYPE 2 DIABETES MELLITUS WITHOUT COMPLICATION, WITHOUT LONG-TERM CURRENT USE OF INSULIN (HCC): ICD-10-CM

## 2020-03-30 RX ORDER — INSULIN GLARGINE 100 [IU]/ML
INJECTION, SOLUTION SUBCUTANEOUS
Qty: 15 ML | Refills: 0 | Status: SHIPPED | OUTPATIENT
Start: 2020-03-30 | End: 2020-04-27 | Stop reason: SDUPTHER

## 2020-04-09 DIAGNOSIS — I10 BENIGN ESSENTIAL HYPERTENSION: ICD-10-CM

## 2020-04-10 RX ORDER — AMLODIPINE BESYLATE 5 MG/1
TABLET ORAL
Qty: 30 TABLET | Refills: 11 | Status: SHIPPED | OUTPATIENT
Start: 2020-04-10 | End: 2020-05-12 | Stop reason: SDUPTHER

## 2020-04-26 DIAGNOSIS — E11.9 TYPE 2 DIABETES MELLITUS WITHOUT COMPLICATION, WITHOUT LONG-TERM CURRENT USE OF INSULIN (HCC): ICD-10-CM

## 2020-04-26 DIAGNOSIS — I10 BENIGN ESSENTIAL HYPERTENSION: ICD-10-CM

## 2020-04-26 RX ORDER — METOPROLOL TARTRATE 50 MG/1
TABLET, FILM COATED ORAL
Qty: 60 TABLET | Refills: 11 | Status: SHIPPED | OUTPATIENT
Start: 2020-04-26 | End: 2020-05-12 | Stop reason: SDUPTHER

## 2020-04-26 RX ORDER — VALSARTAN AND HYDROCHLOROTHIAZIDE 160; 25 MG/1; MG/1
TABLET ORAL
Qty: 30 TABLET | Refills: 11 | Status: SHIPPED | OUTPATIENT
Start: 2020-04-26 | End: 2020-05-12 | Stop reason: SDUPTHER

## 2020-04-26 RX ORDER — SIMVASTATIN 20 MG
TABLET ORAL
Qty: 30 TABLET | Refills: 11 | Status: SHIPPED | OUTPATIENT
Start: 2020-04-26 | End: 2020-05-12 | Stop reason: SDUPTHER

## 2020-04-27 DIAGNOSIS — E11.9 TYPE 2 DIABETES MELLITUS WITHOUT COMPLICATION, WITHOUT LONG-TERM CURRENT USE OF INSULIN (HCC): ICD-10-CM

## 2020-04-27 RX ORDER — INSULIN GLARGINE 100 [IU]/ML
INJECTION, SOLUTION SUBCUTANEOUS
Qty: 15 ML | Refills: 11 | Status: SHIPPED | OUTPATIENT
Start: 2020-04-27 | End: 2021-05-17 | Stop reason: SDUPTHER

## 2020-05-11 DIAGNOSIS — I10 BENIGN ESSENTIAL HYPERTENSION: Primary | ICD-10-CM

## 2020-05-11 RX ORDER — HYDROCHLOROTHIAZIDE 25 MG/1
25 TABLET ORAL DAILY
Qty: 30 TABLET | Refills: 3 | Status: SHIPPED | OUTPATIENT
Start: 2020-05-11 | End: 2020-12-21 | Stop reason: ALTCHOICE

## 2020-05-11 RX ORDER — VALSARTAN 160 MG/1
160 TABLET ORAL DAILY
Qty: 30 TABLET | Refills: 3 | Status: SHIPPED | OUTPATIENT
Start: 2020-05-11 | End: 2020-12-21 | Stop reason: ALTCHOICE

## 2020-05-12 DIAGNOSIS — E03.9 ACQUIRED HYPOTHYROIDISM: ICD-10-CM

## 2020-05-12 DIAGNOSIS — E11.9 TYPE 2 DIABETES MELLITUS WITHOUT COMPLICATION, WITHOUT LONG-TERM CURRENT USE OF INSULIN (HCC): ICD-10-CM

## 2020-05-12 DIAGNOSIS — I10 BENIGN ESSENTIAL HYPERTENSION: ICD-10-CM

## 2020-05-12 RX ORDER — VALSARTAN AND HYDROCHLOROTHIAZIDE 160; 25 MG/1; MG/1
1 TABLET ORAL DAILY
Qty: 90 TABLET | Refills: 1 | Status: SHIPPED | OUTPATIENT
Start: 2020-05-12 | End: 2020-11-19 | Stop reason: SDUPTHER

## 2020-05-12 RX ORDER — METOPROLOL TARTRATE 50 MG/1
50 TABLET, FILM COATED ORAL 2 TIMES DAILY
Qty: 180 TABLET | Refills: 1 | Status: SHIPPED | OUTPATIENT
Start: 2020-05-12 | End: 2021-09-07 | Stop reason: SDUPTHER

## 2020-05-12 RX ORDER — AMLODIPINE BESYLATE 5 MG/1
5 TABLET ORAL DAILY
Qty: 90 TABLET | Refills: 1 | Status: SHIPPED | OUTPATIENT
Start: 2020-05-12 | End: 2020-11-19 | Stop reason: SDUPTHER

## 2020-05-12 RX ORDER — LEVOTHYROXINE SODIUM 0.1 MG/1
100 TABLET ORAL DAILY
Qty: 90 TABLET | Refills: 1 | Status: SHIPPED | OUTPATIENT
Start: 2020-05-12 | End: 2020-11-19 | Stop reason: SDUPTHER

## 2020-05-12 RX ORDER — SIMVASTATIN 20 MG
20 TABLET ORAL DAILY
Qty: 90 TABLET | Refills: 1 | Status: SHIPPED | OUTPATIENT
Start: 2020-05-12 | End: 2020-11-19 | Stop reason: SDUPTHER

## 2020-05-14 DIAGNOSIS — M17.11 PRIMARY OSTEOARTHRITIS OF RIGHT KNEE: Primary | ICD-10-CM

## 2020-05-14 RX ORDER — DICLOFENAC POTASSIUM 50 MG/1
50 TABLET, FILM COATED ORAL 3 TIMES DAILY
Qty: 270 TABLET | Refills: 3 | Status: SHIPPED | OUTPATIENT
Start: 2020-05-14 | End: 2021-03-09 | Stop reason: SDUPTHER

## 2020-06-17 DIAGNOSIS — E11.8 TYPE 2 DIABETES MELLITUS WITH COMPLICATION, WITH LONG-TERM CURRENT USE OF INSULIN (HCC): ICD-10-CM

## 2020-06-17 DIAGNOSIS — Z79.4 TYPE 2 DIABETES MELLITUS WITH COMPLICATION, WITH LONG-TERM CURRENT USE OF INSULIN (HCC): ICD-10-CM

## 2020-09-17 DIAGNOSIS — E04.2 MULTIPLE THYROID NODULES: Primary | ICD-10-CM

## 2020-09-27 DIAGNOSIS — E11.9 TYPE 2 DIABETES MELLITUS WITHOUT COMPLICATION, WITHOUT LONG-TERM CURRENT USE OF INSULIN (HCC): ICD-10-CM

## 2020-09-27 DIAGNOSIS — I10 BENIGN ESSENTIAL HYPERTENSION: ICD-10-CM

## 2020-09-27 DIAGNOSIS — E03.9 ACQUIRED HYPOTHYROIDISM: ICD-10-CM

## 2020-09-28 RX ORDER — LEVOTHYROXINE SODIUM 0.1 MG/1
TABLET ORAL
Qty: 90 TABLET | Refills: 3 | OUTPATIENT
Start: 2020-09-28

## 2020-09-28 RX ORDER — VALSARTAN AND HYDROCHLOROTHIAZIDE 160; 25 MG/1; MG/1
1 TABLET ORAL DAILY
Qty: 90 TABLET | Refills: 3 | OUTPATIENT
Start: 2020-09-28

## 2020-09-28 RX ORDER — SIMVASTATIN 20 MG
TABLET ORAL
Qty: 90 TABLET | Refills: 3 | OUTPATIENT
Start: 2020-09-28

## 2020-09-28 RX ORDER — AMLODIPINE BESYLATE 5 MG/1
TABLET ORAL
Qty: 90 TABLET | Refills: 3 | OUTPATIENT
Start: 2020-09-28

## 2020-09-28 RX ORDER — METOPROLOL TARTRATE 50 MG/1
TABLET, FILM COATED ORAL
Qty: 180 TABLET | Refills: 3 | OUTPATIENT
Start: 2020-09-28

## 2020-10-12 ENCOUNTER — HOSPITAL ENCOUNTER (OUTPATIENT)
Dept: RADIOLOGY | Facility: HOSPITAL | Age: 65
Discharge: HOME | End: 2020-10-12
Attending: INTERNAL MEDICINE
Payer: COMMERCIAL

## 2020-10-12 ENCOUNTER — HOSPITAL ENCOUNTER (OUTPATIENT)
Dept: ULTRASOUND IMAGING | Facility: HOSPITAL | Age: 65
Discharge: HOME | End: 2020-10-12
Attending: INTERNAL MEDICINE
Payer: COMMERCIAL

## 2020-10-12 DIAGNOSIS — R13.10 DYSPHAGIA, UNSPECIFIED TYPE: ICD-10-CM

## 2020-10-12 DIAGNOSIS — E03.9 ACQUIRED HYPOTHYROIDISM: ICD-10-CM

## 2020-10-12 PROCEDURE — 76536 US EXAM OF HEAD AND NECK: CPT

## 2020-10-12 PROCEDURE — 74220 X-RAY XM ESOPHAGUS 1CNTRST: CPT

## 2020-10-13 DIAGNOSIS — E11.9 TYPE 2 DIABETES MELLITUS WITHOUT COMPLICATION, WITHOUT LONG-TERM CURRENT USE OF INSULIN (HCC): ICD-10-CM

## 2020-10-13 DIAGNOSIS — I10 BENIGN ESSENTIAL HYPERTENSION: ICD-10-CM

## 2020-10-13 DIAGNOSIS — E03.9 ACQUIRED HYPOTHYROIDISM: ICD-10-CM

## 2020-10-14 RX ORDER — VALSARTAN AND HYDROCHLOROTHIAZIDE 160; 25 MG/1; MG/1
1 TABLET ORAL DAILY
Qty: 90 TABLET | Refills: 3 | OUTPATIENT
Start: 2020-10-14

## 2020-10-14 RX ORDER — LEVOTHYROXINE SODIUM 0.1 MG/1
TABLET ORAL
Qty: 90 TABLET | Refills: 3 | OUTPATIENT
Start: 2020-10-14

## 2020-10-14 RX ORDER — AMLODIPINE BESYLATE 5 MG/1
TABLET ORAL
Qty: 90 TABLET | Refills: 3 | OUTPATIENT
Start: 2020-10-14

## 2020-10-14 RX ORDER — METOPROLOL TARTRATE 50 MG/1
TABLET, FILM COATED ORAL
Qty: 180 TABLET | Refills: 3 | OUTPATIENT
Start: 2020-10-14

## 2020-10-14 RX ORDER — SIMVASTATIN 20 MG
TABLET ORAL
Qty: 90 TABLET | Refills: 3 | OUTPATIENT
Start: 2020-10-14

## 2020-10-23 ENCOUNTER — TELEPHONE (OUTPATIENT)
Dept: ENDOCRINOLOGY | Facility: HOSPITAL | Age: 65
End: 2020-10-23

## 2020-10-25 LAB
CORTIS AM PEAK SERPL-MCNC: 1 UG/DL (ref 6.2–19.4)
DEXAMETHASONE SERPL-MCNC: 521 NG/DL
IGF-I SERPL-MCNC: 73 NG/ML (ref 57–202)
T4 FREE SERPL-MCNC: 1.3 NG/DL (ref 0.82–1.77)
TSH SERPL DL<=0.005 MIU/L-ACNC: 0.69 UIU/ML (ref 0.45–4.5)

## 2020-11-17 ENCOUNTER — TELEPHONE (OUTPATIENT)
Dept: FAMILY MEDICINE CLINIC | Facility: HOSPITAL | Age: 65
End: 2020-11-17

## 2020-11-17 DIAGNOSIS — Z12.31 ENCOUNTER FOR SCREENING MAMMOGRAM FOR BREAST CANCER: Primary | ICD-10-CM

## 2020-11-19 DIAGNOSIS — I10 BENIGN ESSENTIAL HYPERTENSION: ICD-10-CM

## 2020-11-19 DIAGNOSIS — E03.9 ACQUIRED HYPOTHYROIDISM: ICD-10-CM

## 2020-11-19 DIAGNOSIS — E11.9 TYPE 2 DIABETES MELLITUS WITHOUT COMPLICATION, WITHOUT LONG-TERM CURRENT USE OF INSULIN (HCC): ICD-10-CM

## 2020-11-19 RX ORDER — VALSARTAN AND HYDROCHLOROTHIAZIDE 160; 25 MG/1; MG/1
1 TABLET ORAL DAILY
Qty: 90 TABLET | Refills: 3 | Status: SHIPPED | OUTPATIENT
Start: 2020-11-19 | End: 2021-09-27 | Stop reason: SDUPTHER

## 2020-11-19 RX ORDER — LEVOTHYROXINE SODIUM 0.1 MG/1
100 TABLET ORAL DAILY
Qty: 90 TABLET | Refills: 3 | Status: SHIPPED | OUTPATIENT
Start: 2020-11-19 | End: 2021-09-27 | Stop reason: SDUPTHER

## 2020-11-19 RX ORDER — SIMVASTATIN 20 MG
20 TABLET ORAL DAILY
Qty: 90 TABLET | Refills: 3 | Status: SHIPPED | OUTPATIENT
Start: 2020-11-19 | End: 2021-09-27 | Stop reason: SDUPTHER

## 2020-11-19 RX ORDER — AMLODIPINE BESYLATE 5 MG/1
5 TABLET ORAL DAILY
Qty: 90 TABLET | Refills: 3 | Status: SHIPPED | OUTPATIENT
Start: 2020-11-19 | End: 2021-09-27 | Stop reason: SDUPTHER

## 2020-12-10 ENCOUNTER — HOSPITAL ENCOUNTER (OUTPATIENT)
Dept: BONE DENSITY | Facility: IMAGING CENTER | Age: 65
Discharge: HOME | End: 2020-12-10
Payer: COMMERCIAL

## 2020-12-10 VITALS — HEIGHT: 61 IN | WEIGHT: 210 LBS | BODY MASS INDEX: 39.65 KG/M2

## 2020-12-10 DIAGNOSIS — Z12.31 ENCOUNTER FOR SCREENING MAMMOGRAM FOR BREAST CANCER: ICD-10-CM

## 2020-12-10 PROCEDURE — 77063 BREAST TOMOSYNTHESIS BI: CPT

## 2020-12-10 PROCEDURE — 77067 SCR MAMMO BI INCL CAD: CPT

## 2020-12-21 ENCOUNTER — OFFICE VISIT (OUTPATIENT)
Dept: FAMILY MEDICINE CLINIC | Facility: HOSPITAL | Age: 65
End: 2020-12-21
Payer: COMMERCIAL

## 2020-12-21 VITALS
HEIGHT: 61 IN | OXYGEN SATURATION: 98 % | DIASTOLIC BLOOD PRESSURE: 82 MMHG | BODY MASS INDEX: 40.78 KG/M2 | SYSTOLIC BLOOD PRESSURE: 140 MMHG | HEART RATE: 73 BPM | WEIGHT: 216 LBS

## 2020-12-21 DIAGNOSIS — E03.9 ACQUIRED HYPOTHYROIDISM: ICD-10-CM

## 2020-12-21 DIAGNOSIS — M17.0 PRIMARY OSTEOARTHRITIS OF BOTH KNEES: ICD-10-CM

## 2020-12-21 DIAGNOSIS — I10 BENIGN ESSENTIAL HYPERTENSION: Primary | ICD-10-CM

## 2020-12-21 DIAGNOSIS — E11.9 TYPE 2 DIABETES MELLITUS WITHOUT COMPLICATION, WITHOUT LONG-TERM CURRENT USE OF INSULIN (HCC): ICD-10-CM

## 2020-12-21 DIAGNOSIS — E78.00 PURE HYPERCHOLESTEROLEMIA: ICD-10-CM

## 2020-12-21 DIAGNOSIS — K21.9 GASTROESOPHAGEAL REFLUX DISEASE WITHOUT ESOPHAGITIS: ICD-10-CM

## 2020-12-21 PROCEDURE — 3008F BODY MASS INDEX DOCD: CPT | Performed by: INTERNAL MEDICINE

## 2020-12-21 PROCEDURE — 99396 PREV VISIT EST AGE 40-64: CPT | Performed by: INTERNAL MEDICINE

## 2020-12-21 PROCEDURE — 3079F DIAST BP 80-89 MM HG: CPT | Performed by: INTERNAL MEDICINE

## 2020-12-21 PROCEDURE — 3077F SYST BP >= 140 MM HG: CPT | Performed by: INTERNAL MEDICINE

## 2020-12-21 RX ORDER — OMEPRAZOLE 20 MG/1
20 CAPSULE, DELAYED RELEASE ORAL DAILY
Qty: 90 CAPSULE | Refills: 3 | Status: SHIPPED | OUTPATIENT
Start: 2020-12-21 | End: 2022-04-05 | Stop reason: SDUPTHER

## 2020-12-21 RX ORDER — TRAMADOL HYDROCHLORIDE 50 MG/1
50 TABLET ORAL 3 TIMES DAILY PRN
Qty: 90 TABLET | Refills: 3 | Status: SHIPPED | OUTPATIENT
Start: 2020-12-21 | End: 2022-05-12 | Stop reason: SDUPTHER

## 2021-02-12 ENCOUNTER — IMMUNIZATIONS (OUTPATIENT)
Dept: FAMILY MEDICINE CLINIC | Facility: HOSPITAL | Age: 66
End: 2021-02-12

## 2021-02-12 DIAGNOSIS — Z23 ENCOUNTER FOR IMMUNIZATION: Primary | ICD-10-CM

## 2021-02-12 PROCEDURE — 91301 SARS-COV-2 / COVID-19 MRNA VACCINE (MODERNA) 100 MCG: CPT

## 2021-02-12 PROCEDURE — 0011A SARS-COV-2 / COVID-19 MRNA VACCINE (MODERNA) 100 MCG: CPT

## 2021-03-09 DIAGNOSIS — M17.11 PRIMARY OSTEOARTHRITIS OF RIGHT KNEE: ICD-10-CM

## 2021-03-09 RX ORDER — DICLOFENAC POTASSIUM 50 MG/1
50 TABLET, FILM COATED ORAL 3 TIMES DAILY
Qty: 270 TABLET | Refills: 3 | Status: SHIPPED | OUTPATIENT
Start: 2021-03-09 | End: 2022-03-02 | Stop reason: SDUPTHER

## 2021-03-11 ENCOUNTER — IMMUNIZATIONS (OUTPATIENT)
Dept: FAMILY MEDICINE CLINIC | Facility: HOSPITAL | Age: 66
End: 2021-03-11

## 2021-03-11 DIAGNOSIS — Z23 ENCOUNTER FOR IMMUNIZATION: Primary | ICD-10-CM

## 2021-03-11 PROCEDURE — 91301 SARS-COV-2 / COVID-19 MRNA VACCINE (MODERNA) 100 MCG: CPT

## 2021-03-11 PROCEDURE — 0012A SARS-COV-2 / COVID-19 MRNA VACCINE (MODERNA) 100 MCG: CPT

## 2021-03-23 LAB
LEFT EYE DIABETIC RETINOPATHY: NORMAL
RIGHT EYE DIABETIC RETINOPATHY: NORMAL

## 2021-05-17 DIAGNOSIS — E11.9 TYPE 2 DIABETES MELLITUS WITHOUT COMPLICATION, WITHOUT LONG-TERM CURRENT USE OF INSULIN (HCC): ICD-10-CM

## 2021-05-17 RX ORDER — INSULIN GLARGINE 100 [IU]/ML
INJECTION, SOLUTION SUBCUTANEOUS
Qty: 15 ML | Refills: 11 | Status: SHIPPED | OUTPATIENT
Start: 2021-05-17 | End: 2021-11-22

## 2021-05-19 LAB
ALBUMIN SERPL-MCNC: 4.4 G/DL (ref 3.8–4.8)
ALBUMIN/CREAT UR: 5 MG/G CREAT (ref 0–29)
ALBUMIN/GLOB SERPL: 1.6 {RATIO} (ref 1.2–2.2)
ALP SERPL-CCNC: 138 IU/L (ref 48–121)
ALT SERPL-CCNC: 14 IU/L (ref 0–32)
AST SERPL-CCNC: 17 IU/L (ref 0–40)
BILIRUB SERPL-MCNC: 0.5 MG/DL (ref 0–1.2)
BUN SERPL-MCNC: 13 MG/DL (ref 8–27)
BUN/CREAT SERPL: 15 (ref 12–28)
CALCIUM SERPL-MCNC: 9.3 MG/DL (ref 8.7–10.3)
CHLORIDE SERPL-SCNC: 99 MMOL/L (ref 96–106)
CHOLEST SERPL-MCNC: 143 MG/DL (ref 100–199)
CO2 SERPL-SCNC: 27 MMOL/L (ref 20–29)
CREAT SERPL-MCNC: 0.84 MG/DL (ref 0.57–1)
CREAT UR-MCNC: 111.9 MG/DL
ERYTHROCYTE [DISTWIDTH] IN BLOOD BY AUTOMATED COUNT: 12.9 % (ref 11.7–15.4)
EST. AVERAGE GLUCOSE BLD GHB EST-MCNC: 160 MG/DL
GLOBULIN SER-MCNC: 2.8 G/DL (ref 1.5–4.5)
GLUCOSE SERPL-MCNC: 209 MG/DL (ref 65–99)
HBA1C MFR BLD: 7.2 % (ref 4.8–5.6)
HCT VFR BLD AUTO: 38.4 % (ref 34–46.6)
HDLC SERPL-MCNC: 40 MG/DL
HGB BLD-MCNC: 12.7 G/DL (ref 11.1–15.9)
LDLC SERPL CALC-MCNC: 80 MG/DL (ref 0–99)
LDLC/HDLC SERPL: 2 RATIO (ref 0–3.2)
MCH RBC QN AUTO: 27.8 PG (ref 26.6–33)
MCHC RBC AUTO-ENTMCNC: 33.1 G/DL (ref 31.5–35.7)
MCV RBC AUTO: 84 FL (ref 79–97)
MICROALBUMIN UR-MCNC: 5.8 UG/ML
PLATELET # BLD AUTO: 271 X10E3/UL (ref 150–450)
POTASSIUM SERPL-SCNC: 4.2 MMOL/L (ref 3.5–5.2)
PROT SERPL-MCNC: 7.2 G/DL (ref 6–8.5)
RBC # BLD AUTO: 4.57 X10E6/UL (ref 3.77–5.28)
SL AMB EGFR AFRICAN AMERICAN: 85 ML/MIN/1.73
SL AMB EGFR NON AFRICAN AMERICAN: 74 ML/MIN/1.73
SL AMB VLDL CHOLESTEROL CALC: 23 MG/DL (ref 5–40)
SODIUM SERPL-SCNC: 138 MMOL/L (ref 134–144)
TRIGL SERPL-MCNC: 128 MG/DL (ref 0–149)
WBC # BLD AUTO: 14.7 X10E3/UL (ref 3.4–10.8)

## 2021-05-26 ENCOUNTER — TELEPHONE (OUTPATIENT)
Dept: FAMILY MEDICINE CLINIC | Facility: HOSPITAL | Age: 66
End: 2021-05-26

## 2021-06-21 ENCOUNTER — OFFICE VISIT (OUTPATIENT)
Dept: FAMILY MEDICINE CLINIC | Facility: HOSPITAL | Age: 66
End: 2021-06-21
Payer: COMMERCIAL

## 2021-06-21 VITALS
HEART RATE: 68 BPM | WEIGHT: 222 LBS | SYSTOLIC BLOOD PRESSURE: 130 MMHG | HEIGHT: 61 IN | BODY MASS INDEX: 41.91 KG/M2 | DIASTOLIC BLOOD PRESSURE: 78 MMHG

## 2021-06-21 DIAGNOSIS — E11.9 TYPE 2 DIABETES MELLITUS WITHOUT COMPLICATION, WITHOUT LONG-TERM CURRENT USE OF INSULIN (HCC): ICD-10-CM

## 2021-06-21 DIAGNOSIS — E78.00 PURE HYPERCHOLESTEROLEMIA: ICD-10-CM

## 2021-06-21 DIAGNOSIS — M35.00 H/O SJOGREN'S DISEASE (HCC): ICD-10-CM

## 2021-06-21 DIAGNOSIS — I10 BENIGN ESSENTIAL HYPERTENSION: Primary | ICD-10-CM

## 2021-06-21 DIAGNOSIS — E03.9 ACQUIRED HYPOTHYROIDISM: ICD-10-CM

## 2021-06-21 DIAGNOSIS — I73.9 PAD (PERIPHERAL ARTERY DISEASE) (HCC): ICD-10-CM

## 2021-06-21 PROCEDURE — 3008F BODY MASS INDEX DOCD: CPT | Performed by: INTERNAL MEDICINE

## 2021-06-21 PROCEDURE — 3075F SYST BP GE 130 - 139MM HG: CPT | Performed by: INTERNAL MEDICINE

## 2021-06-21 PROCEDURE — 3078F DIAST BP <80 MM HG: CPT | Performed by: INTERNAL MEDICINE

## 2021-06-21 PROCEDURE — 1036F TOBACCO NON-USER: CPT | Performed by: INTERNAL MEDICINE

## 2021-06-21 PROCEDURE — 3725F SCREEN DEPRESSION PERFORMED: CPT | Performed by: INTERNAL MEDICINE

## 2021-06-21 PROCEDURE — 99214 OFFICE O/P EST MOD 30 MIN: CPT | Performed by: INTERNAL MEDICINE

## 2021-06-21 NOTE — PROGRESS NOTES
BMI Counseling: Body mass index is 41 95 kg/m²  The BMI is above normal  Nutrition recommendations include reducing portion sizes  Assessment/Plan:       Diagnoses and all orders for this visit:    Benign essential hypertension    Acquired hypothyroidism    Type 2 diabetes mellitus without complication, without long-term current use of insulin ([facility])  -     insulin lispro (HumaLOG) 100 units/mL injection; Inject 20 Units under the skin 3 (three) times a day with meals    Pure hypercholesterolemia    PAD (peripheral artery disease) ([facility])  -     VAS lower limb arterial duplex, complete bilateral; Future    H/O Sjogren's disease ([facility] [address] )    Other orders  -     Discontinue: insulin lispro (HumaLOG) 100 units/mL injection; Inject under the skin          All of the above diagnoses have been assessed  Additional COMMENTS/PLAN: will see back in 6 month      Subjective:      Patient ID: Doc Michael is a 61 y o  female  HPI     DM-this is under reasonable control  Does not see endocrine  Leg pain-this is a combination of knee pain and and some calf pain  Sjogrens-this is stable  HTN-stable with the current regimen  The following portions of the patient's history were revised and updated as appropriate: Problem list, allergies, med list, FH, SH, Past medical and surgical histories      Current Outpatient Medications   Medication Sig Dispense Refill    amLODIPine (NORVASC) 5 mg tablet Take 1 tablet (5 mg total) by mouth daily 90 tablet 3    ascorbic acid (VITAMIN C) 500 mg tablet Take 1 tablet (500 mg total) by mouth 2 (two) times a day 60 tablet 0    aspirin 81 MG tablet Take 1 tablet by mouth daily      diclofenac potassium (CATAFLAM) 50 mg tablet Take 1 tablet (50 mg total) by mouth 3 (three) times a day 810 tablet 3    folic acid (FOLVITE) 1 mg tablet Take 1 tablet (1 mg total) by mouth daily 30 tablet 0    glucose blood (FREESTYLE LITE) test strip TEST FOUR TIMES A  each 5    glucose monitoring kit (FREESTYLE) monitoring kit Testing 4 times daily - DX- E11 9 1 each 0    insulin aspart (NOVOLOG FLEXPEN) 100 Units/mL injection pen Inject 20 Units under the skin 3 (three) times a day with meals Before meals or sliding 10 pen 5    insulin lispro (HumaLOG) 100 units/mL injection Inject 20 Units under the skin 3 (three) times a day with meals 15 mL 5    Insulin Syringe-Needle U-100 (B-D INS SYR ULTRAFINE  3CC/31G) 31G X 5/16" 0 3 ML MISC by Does not apply route      Lantus SoloStar 100 units/mL injection pen INJECT 36 UNITS UNDER THE SKIN TWO TIMES A DAY 15 mL 11    levothyroxine 100 mcg tablet Take 1 tablet (100 mcg total) by mouth daily 90 tablet 3    meclizine (ANTIVERT) 12 5 MG tablet Take 1 tablet (12 5 mg total) by mouth every 8 (eight) hours as needed for dizziness or nausea Take TID for three days then prn 20 tablet 0    metoprolol tartrate (LOPRESSOR) 50 mg tablet Take 1 tablet (50 mg total) by mouth 2 (two) times a day 180 tablet 1    omeprazole (PriLOSEC) 20 mg delayed release capsule Take 1 capsule (20 mg total) by mouth daily 90 capsule 3    simvastatin (ZOCOR) 20 mg tablet Take 1 tablet (20 mg total) by mouth daily 90 tablet 3    traMADol (ULTRAM) 50 mg tablet Take 1 tablet (50 mg total) by mouth 3 (three) times a day as needed for moderate pain 90 tablet 3    valsartan-hydrochlorothiazide (DIOVAN-HCT) 160-25 MG per tablet Take 1 tablet by mouth daily 90 tablet 3     No current facility-administered medications for this visit  Review of Systems   All other systems reviewed and are negative          Objective:    /78 (BP Location: Left arm, Patient Position: Sitting, Cuff Size: Standard)   Pulse 68   Ht 5' 1" (1 549 m)   Wt 101 kg (222 lb)   LMP  (LMP Unknown)   BMI 41 95 kg/m²     BP Readings from Last 3 Encounters:   06/21/21 130/78   12/21/20 140/82   12/26/19 130/70                  Wt Readings from Last 3 Encounters:   06/21/21 101 kg (222 lb)   12/21/20 98 kg (216 lb)   12/10/20 95 3 kg (210 lb)         Physical Exam  Vitals reviewed  Constitutional:       Appearance: Normal appearance  Neck:      Vascular: No carotid bruit  Cardiovascular:      Rate and Rhythm: Normal rate and regular rhythm  Heart sounds: Normal heart sounds  Pulmonary:      Effort: Pulmonary effort is normal       Breath sounds: Normal breath sounds  Abdominal:      General: Abdomen is flat  Bowel sounds are normal       Palpations: Abdomen is soft  Musculoskeletal:      Right lower leg: No edema  Left lower leg: No edema  Neurological:      Mental Status: She is alert  Comments: Dyskinesia of R hand           No visits with results within 2 Week(s) from this visit  Latest known visit with results is:   Orders Only on 03/23/2021   Component Date Value Ref Range Status    Right Eye Diabetic Retinopathy 03/23/2021 Mild   Final    Left Eye Diabetic Retinopathy 03/23/2021 Mild   Final         Maria Esther Chun MD    Some or all of this note was generated with a voice recognition dictation system and therefore my contain grammatical or spelling errors

## 2021-07-27 ENCOUNTER — TELEPHONE (OUTPATIENT)
Dept: FAMILY MEDICINE CLINIC | Facility: HOSPITAL | Age: 66
End: 2021-07-27

## 2021-09-07 DIAGNOSIS — I10 BENIGN ESSENTIAL HYPERTENSION: ICD-10-CM

## 2021-09-07 RX ORDER — METOPROLOL TARTRATE 50 MG/1
50 TABLET, FILM COATED ORAL 2 TIMES DAILY
Qty: 180 TABLET | Refills: 1 | Status: SHIPPED | OUTPATIENT
Start: 2021-09-07 | End: 2022-01-17

## 2021-09-27 DIAGNOSIS — E03.9 ACQUIRED HYPOTHYROIDISM: ICD-10-CM

## 2021-09-27 DIAGNOSIS — E11.9 TYPE 2 DIABETES MELLITUS WITHOUT COMPLICATION, WITHOUT LONG-TERM CURRENT USE OF INSULIN (HCC): ICD-10-CM

## 2021-09-27 DIAGNOSIS — I10 BENIGN ESSENTIAL HYPERTENSION: ICD-10-CM

## 2021-09-27 RX ORDER — VALSARTAN AND HYDROCHLOROTHIAZIDE 160; 25 MG/1; MG/1
1 TABLET ORAL DAILY
Qty: 90 TABLET | Refills: 3 | Status: SHIPPED | OUTPATIENT
Start: 2021-09-27 | End: 2022-03-02 | Stop reason: SDUPTHER

## 2021-09-27 RX ORDER — SIMVASTATIN 20 MG
20 TABLET ORAL DAILY
Qty: 90 TABLET | Refills: 3 | Status: SHIPPED | OUTPATIENT
Start: 2021-09-27 | End: 2022-03-02 | Stop reason: SDUPTHER

## 2021-09-27 RX ORDER — AMLODIPINE BESYLATE 5 MG/1
5 TABLET ORAL DAILY
Qty: 90 TABLET | Refills: 3 | Status: SHIPPED | OUTPATIENT
Start: 2021-09-27 | End: 2022-03-02 | Stop reason: SDUPTHER

## 2021-09-27 RX ORDER — LEVOTHYROXINE SODIUM 0.1 MG/1
100 TABLET ORAL DAILY
Qty: 90 TABLET | Refills: 3 | Status: SHIPPED | OUTPATIENT
Start: 2021-09-27 | End: 2022-03-02 | Stop reason: SDUPTHER

## 2021-11-09 ENCOUNTER — TELEPHONE (OUTPATIENT)
Dept: FAMILY MEDICINE CLINIC | Facility: HOSPITAL | Age: 66
End: 2021-11-09

## 2021-11-09 PROCEDURE — U0005 INFEC AGEN DETEC AMPLI PROBE: HCPCS | Performed by: INTERNAL MEDICINE

## 2021-11-09 PROCEDURE — U0003 INFECTIOUS AGENT DETECTION BY NUCLEIC ACID (DNA OR RNA); SEVERE ACUTE RESPIRATORY SYNDROME CORONAVIRUS 2 (SARS-COV-2) (CORONAVIRUS DISEASE [COVID-19]), AMPLIFIED PROBE TECHNIQUE, MAKING USE OF HIGH THROUGHPUT TECHNOLOGIES AS DESCRIBED BY CMS-2020-01-R: HCPCS | Performed by: INTERNAL MEDICINE

## 2021-11-10 ENCOUNTER — TELEPHONE (OUTPATIENT)
Dept: FAMILY MEDICINE CLINIC | Facility: HOSPITAL | Age: 66
End: 2021-11-10

## 2021-11-10 ENCOUNTER — TELEMEDICINE (OUTPATIENT)
Dept: FAMILY MEDICINE CLINIC | Facility: HOSPITAL | Age: 66
End: 2021-11-10
Payer: COMMERCIAL

## 2021-11-10 DIAGNOSIS — U07.1 COVID-19: Primary | ICD-10-CM

## 2021-11-10 PROCEDURE — 99441 PR PHYS/QHP TELEPHONE EVALUATION 5-10 MIN: CPT | Performed by: INTERNAL MEDICINE

## 2021-11-10 RX ORDER — ONDANSETRON 2 MG/ML
4 INJECTION INTRAMUSCULAR; INTRAVENOUS ONCE AS NEEDED
Status: CANCELLED | OUTPATIENT
Start: 2021-11-11

## 2021-11-10 RX ORDER — SODIUM CHLORIDE 9 MG/ML
20 INJECTION, SOLUTION INTRAVENOUS ONCE
Status: CANCELLED | OUTPATIENT
Start: 2021-11-11

## 2021-11-10 RX ORDER — ALBUTEROL SULFATE 90 UG/1
3 AEROSOL, METERED RESPIRATORY (INHALATION) ONCE AS NEEDED
Status: CANCELLED | OUTPATIENT
Start: 2021-11-11

## 2021-11-10 RX ORDER — ACETAMINOPHEN 325 MG/1
650 TABLET ORAL ONCE AS NEEDED
Status: CANCELLED | OUTPATIENT
Start: 2021-11-11

## 2021-11-11 ENCOUNTER — HOSPITAL ENCOUNTER (OUTPATIENT)
Dept: INFUSION CENTER | Facility: HOSPITAL | Age: 66
Discharge: HOME | End: 2021-11-11
Payer: COMMERCIAL

## 2021-11-11 VITALS
TEMPERATURE: 96.2 F | HEART RATE: 59 BPM | RESPIRATION RATE: 18 BRPM | DIASTOLIC BLOOD PRESSURE: 57 MMHG | SYSTOLIC BLOOD PRESSURE: 123 MMHG | OXYGEN SATURATION: 97 %

## 2021-11-11 DIAGNOSIS — U07.1 COVID-19: Primary | ICD-10-CM

## 2021-11-11 PROCEDURE — M0243 CASIRIVI AND IMDEVI INFUSION: HCPCS | Performed by: INTERNAL MEDICINE

## 2021-11-11 RX ORDER — ONDANSETRON 2 MG/ML
4 INJECTION INTRAMUSCULAR; INTRAVENOUS ONCE AS NEEDED
Status: CANCELLED | OUTPATIENT
Start: 2021-11-11

## 2021-11-11 RX ORDER — ALBUTEROL SULFATE 90 UG/1
3 AEROSOL, METERED RESPIRATORY (INHALATION) ONCE AS NEEDED
Status: DISCONTINUED | OUTPATIENT
Start: 2021-11-11 | End: 2021-11-14 | Stop reason: HOSPADM

## 2021-11-11 RX ORDER — ACETAMINOPHEN 325 MG/1
650 TABLET ORAL ONCE AS NEEDED
Status: CANCELLED | OUTPATIENT
Start: 2021-11-11

## 2021-11-11 RX ORDER — ONDANSETRON 2 MG/ML
4 INJECTION INTRAMUSCULAR; INTRAVENOUS ONCE AS NEEDED
Status: DISCONTINUED | OUTPATIENT
Start: 2021-11-11 | End: 2021-11-14 | Stop reason: HOSPADM

## 2021-11-11 RX ORDER — SODIUM CHLORIDE 9 MG/ML
20 INJECTION, SOLUTION INTRAVENOUS ONCE
Status: COMPLETED | OUTPATIENT
Start: 2021-11-11 | End: 2021-11-11

## 2021-11-11 RX ORDER — ACETAMINOPHEN 325 MG/1
650 TABLET ORAL ONCE AS NEEDED
Status: DISCONTINUED | OUTPATIENT
Start: 2021-11-11 | End: 2021-11-14 | Stop reason: HOSPADM

## 2021-11-11 RX ORDER — ALBUTEROL SULFATE 90 UG/1
3 AEROSOL, METERED RESPIRATORY (INHALATION) ONCE AS NEEDED
Status: CANCELLED | OUTPATIENT
Start: 2021-11-11

## 2021-11-11 RX ORDER — SODIUM CHLORIDE 9 MG/ML
20 INJECTION, SOLUTION INTRAVENOUS ONCE
Status: CANCELLED | OUTPATIENT
Start: 2021-11-11

## 2021-11-11 RX ADMIN — CASIRIVIMAB AND IMDEVIMAB 1200 MG COMBINED: 600; 600 INJECTION, SOLUTION, CONCENTRATE INTRAVENOUS at 17:29

## 2021-11-11 RX ADMIN — SODIUM CHLORIDE 20 ML/HR: 9 INJECTION, SOLUTION INTRAVENOUS at 17:14

## 2021-11-12 ENCOUNTER — TELEMEDICINE (OUTPATIENT)
Dept: FAMILY MEDICINE CLINIC | Facility: HOSPITAL | Age: 66
End: 2021-11-12
Payer: COMMERCIAL

## 2021-11-12 DIAGNOSIS — U07.1 COVID-19: Primary | ICD-10-CM

## 2021-11-12 PROCEDURE — 99441 PR PHYS/QHP TELEPHONE EVALUATION 5-10 MIN: CPT | Performed by: INTERNAL MEDICINE

## 2021-11-17 ENCOUNTER — TELEPHONE (OUTPATIENT)
Dept: FAMILY MEDICINE CLINIC | Facility: HOSPITAL | Age: 66
End: 2021-11-17

## 2021-11-22 ENCOUNTER — OFFICE VISIT (OUTPATIENT)
Dept: FAMILY MEDICINE CLINIC | Facility: HOSPITAL | Age: 66
End: 2021-11-22
Payer: COMMERCIAL

## 2021-11-22 VITALS
DIASTOLIC BLOOD PRESSURE: 78 MMHG | SYSTOLIC BLOOD PRESSURE: 128 MMHG | OXYGEN SATURATION: 97 % | HEART RATE: 58 BPM | BODY MASS INDEX: 37.6 KG/M2 | WEIGHT: 199 LBS

## 2021-11-22 DIAGNOSIS — R41.89 BRAIN FOG: ICD-10-CM

## 2021-11-22 DIAGNOSIS — E11.9 TYPE 2 DIABETES MELLITUS WITHOUT COMPLICATION, WITHOUT LONG-TERM CURRENT USE OF INSULIN (HCC): ICD-10-CM

## 2021-11-22 DIAGNOSIS — U09.9 POST-COVID SYNDROME: Primary | ICD-10-CM

## 2021-11-22 PROCEDURE — 3074F SYST BP LT 130 MM HG: CPT | Performed by: INTERNAL MEDICINE

## 2021-11-22 PROCEDURE — 3078F DIAST BP <80 MM HG: CPT | Performed by: INTERNAL MEDICINE

## 2021-11-22 PROCEDURE — 1036F TOBACCO NON-USER: CPT | Performed by: INTERNAL MEDICINE

## 2021-11-22 PROCEDURE — 99214 OFFICE O/P EST MOD 30 MIN: CPT | Performed by: INTERNAL MEDICINE

## 2021-11-22 RX ORDER — INSULIN GLARGINE 100 [IU]/ML
INJECTION, SOLUTION SUBCUTANEOUS
Qty: 15 ML | Refills: 11
Start: 2021-11-22 | End: 2021-12-21 | Stop reason: SDUPTHER

## 2021-12-21 DIAGNOSIS — E11.9 TYPE 2 DIABETES MELLITUS WITHOUT COMPLICATION, WITHOUT LONG-TERM CURRENT USE OF INSULIN (HCC): ICD-10-CM

## 2021-12-21 RX ORDER — INSULIN GLARGINE 100 [IU]/ML
INJECTION, SOLUTION SUBCUTANEOUS
Qty: 15 ML | Refills: 11 | Status: SHIPPED | OUTPATIENT
Start: 2021-12-21 | End: 2022-03-02 | Stop reason: SDUPTHER

## 2022-01-14 ENCOUNTER — OFFICE VISIT (OUTPATIENT)
Dept: FAMILY MEDICINE CLINIC | Facility: HOSPITAL | Age: 67
End: 2022-01-14
Payer: COMMERCIAL

## 2022-01-14 VITALS
SYSTOLIC BLOOD PRESSURE: 130 MMHG | WEIGHT: 188.4 LBS | DIASTOLIC BLOOD PRESSURE: 80 MMHG | HEART RATE: 66 BPM | BODY MASS INDEX: 35.6 KG/M2

## 2022-01-14 DIAGNOSIS — E11.9 TYPE 2 DIABETES MELLITUS WITHOUT COMPLICATION, WITHOUT LONG-TERM CURRENT USE OF INSULIN (HCC): ICD-10-CM

## 2022-01-14 DIAGNOSIS — E03.9 ACQUIRED HYPOTHYROIDISM: ICD-10-CM

## 2022-01-14 DIAGNOSIS — E78.00 PURE HYPERCHOLESTEROLEMIA: ICD-10-CM

## 2022-01-14 DIAGNOSIS — F43.21 GRIEF REACTION: ICD-10-CM

## 2022-01-14 DIAGNOSIS — M17.11 PRIMARY OSTEOARTHRITIS OF RIGHT KNEE: Primary | ICD-10-CM

## 2022-01-14 DIAGNOSIS — I10 BENIGN ESSENTIAL HYPERTENSION: ICD-10-CM

## 2022-01-14 PROBLEM — F43.20 GRIEF REACTION: Status: ACTIVE | Noted: 2022-01-14

## 2022-01-14 PROCEDURE — 3075F SYST BP GE 130 - 139MM HG: CPT | Performed by: INTERNAL MEDICINE

## 2022-01-14 PROCEDURE — 3079F DIAST BP 80-89 MM HG: CPT | Performed by: INTERNAL MEDICINE

## 2022-01-14 PROCEDURE — 99213 OFFICE O/P EST LOW 20 MIN: CPT | Performed by: INTERNAL MEDICINE

## 2022-01-14 NOTE — PROGRESS NOTES
Assessment/Plan:       Diagnoses and all orders for this visit:    Primary osteoarthritis of right knee  -     Ambulatory referral to Orthopedic Surgery; Future    Grief reaction    Type 2 diabetes mellitus without complication, without long-term current use of insulin (HCC)  -     CBC; Future  -     Comprehensive metabolic panel; Future  -     Hemoglobin A1C; Future  -     Microalbumin / creatinine urine ratio  -     CBC  -     Comprehensive metabolic panel  -     Hemoglobin A1C    Benign essential hypertension    Pure hypercholesterolemia  -     Lipid Panel with Direct LDL reflex; Future  -     Lipid Panel with Direct LDL reflex    Acquired hypothyroidism  -     TSH, 3rd generation; Future  -     TSH, 3rd generation          All of the above diagnoses have been assessed  Additional COMMENTS/PLAN:  I think she should have her knee replaced  Will see back in 3 months with BW before that  Subjective:      Patient ID: Sobeida Irby is a 61 y o  female  HPI     Originally sent for PT post covid  Was found to have cataracts  Generally this is better  Grief reaction  - not sleeping well  R knee pain-this is still active  The following portions of the patient's history were revised and updated as appropriate: Problem list, allergies, med list, FH, SH, Past medical and surgical histories      Current Outpatient Medications   Medication Sig Dispense Refill    amLODIPine (NORVASC) 5 mg tablet Take 1 tablet (5 mg total) by mouth daily 90 tablet 3    ascorbic acid (VITAMIN C) 500 mg tablet Take 1 tablet (500 mg total) by mouth 2 (two) times a day 60 tablet 0    aspirin 81 MG tablet Take 1 tablet by mouth daily      diclofenac potassium (CATAFLAM) 50 mg tablet Take 1 tablet (50 mg total) by mouth 3 (three) times a day 270 tablet 3    fluticasone (FLONASE) 50 mcg/act nasal spray 2 sprays into each nostril daily 1 g 5    glucose blood (FREESTYLE LITE) test strip TEST FOUR TIMES A  each 5    glucose monitoring kit (FREESTYLE) monitoring kit Testing 4 times daily - DX- E11 9 1 each 0    insulin aspart (NOVOLOG FLEXPEN) 100 Units/mL injection pen Inject 20 Units under the skin 3 (three) times a day with meals Before meals or sliding 10 pen 5    insulin lispro (HumaLOG) 100 units/mL injection Inject 20 Units under the skin 3 (three) times a day with meals 15 mL 5    Insulin Syringe-Needle U-100 (B-D INS SYR ULTRAFINE  3CC/31G) 31G X 5/16" 0 3 ML MISC by Does not apply route      Lantus SoloStar 100 units/mL injection pen 30 units hs 15 mL 11    levothyroxine 100 mcg tablet Take 1 tablet (100 mcg total) by mouth daily 90 tablet 3    meclizine (ANTIVERT) 12 5 MG tablet Take 1 tablet (12 5 mg total) by mouth every 8 (eight) hours as needed for dizziness or nausea Take TID for three days then prn 20 tablet 0    metoprolol tartrate (LOPRESSOR) 50 mg tablet Take 1 tablet (50 mg total) by mouth 2 (two) times a day 180 tablet 1    omeprazole (PriLOSEC) 20 mg delayed release capsule Take 1 capsule (20 mg total) by mouth daily 90 capsule 3    simvastatin (ZOCOR) 20 mg tablet Take 1 tablet (20 mg total) by mouth daily 90 tablet 3    traMADol (ULTRAM) 50 mg tablet Take 1 tablet (50 mg total) by mouth 3 (three) times a day as needed for moderate pain 90 tablet 3    valsartan-hydrochlorothiazide (DIOVAN-HCT) 160-25 MG per tablet Take 1 tablet by mouth daily 90 tablet 3     No current facility-administered medications for this visit         Review of Systems      Objective:    /80 (BP Location: Left arm, Patient Position: Sitting, Cuff Size: Standard)   Pulse 66   Wt 85 5 kg (188 lb 6 4 oz)   LMP  (LMP Unknown)   BMI 35 60 kg/m²     BP Readings from Last 3 Encounters:   01/14/22 130/80   11/22/21 128/78   11/11/21 123/57                  Wt Readings from Last 3 Encounters:   01/14/22 85 5 kg (188 lb 6 4 oz)   11/22/21 90 3 kg (199 lb)   06/21/21 101 kg (222 lb)         Physical Exam      No visits with results within 2 Week(s) from this visit  Latest known visit with results is:   Orders Only on 11/09/2021   Component Date Value Ref Range Status    SARS-CoV-2 11/09/2021 Positive* Negative Final         Maria Esther Chun MD    Some or all of this note was generated with a voice recognition dictation system and therefore my contain grammatical or spelling errors

## 2022-01-17 DIAGNOSIS — I10 BENIGN ESSENTIAL HYPERTENSION: ICD-10-CM

## 2022-01-17 RX ORDER — METOPROLOL TARTRATE 50 MG/1
TABLET, FILM COATED ORAL
Qty: 180 TABLET | Refills: 3 | Status: SHIPPED | OUTPATIENT
Start: 2022-01-17 | End: 2022-03-02 | Stop reason: SDUPTHER

## 2022-02-09 ENCOUNTER — TELEPHONE (OUTPATIENT)
Dept: FAMILY MEDICINE CLINIC | Facility: HOSPITAL | Age: 67
End: 2022-02-09

## 2022-02-09 DIAGNOSIS — Z12.39 SCREENING BREAST EXAMINATION: Primary | ICD-10-CM

## 2022-02-11 ENCOUNTER — OFFICE VISIT (OUTPATIENT)
Dept: FAMILY MEDICINE CLINIC | Facility: HOSPITAL | Age: 67
End: 2022-02-11
Payer: COMMERCIAL

## 2022-02-11 VITALS
HEIGHT: 61 IN | SYSTOLIC BLOOD PRESSURE: 136 MMHG | BODY MASS INDEX: 35.5 KG/M2 | HEART RATE: 62 BPM | WEIGHT: 188 LBS | OXYGEN SATURATION: 98 % | DIASTOLIC BLOOD PRESSURE: 80 MMHG

## 2022-02-11 DIAGNOSIS — G44.039 EPISODIC PAROXYSMAL HEMICRANIA, NOT INTRACTABLE: Primary | ICD-10-CM

## 2022-02-11 PROBLEM — U07.1 COVID-19: Status: RESOLVED | Noted: 2021-11-10 | Resolved: 2022-02-11

## 2022-02-11 PROCEDURE — 3008F BODY MASS INDEX DOCD: CPT | Performed by: INTERNAL MEDICINE

## 2022-02-11 PROCEDURE — 3079F DIAST BP 80-89 MM HG: CPT | Performed by: INTERNAL MEDICINE

## 2022-02-11 PROCEDURE — 99213 OFFICE O/P EST LOW 20 MIN: CPT | Performed by: INTERNAL MEDICINE

## 2022-02-11 PROCEDURE — 1036F TOBACCO NON-USER: CPT | Performed by: INTERNAL MEDICINE

## 2022-02-11 PROCEDURE — 3075F SYST BP GE 130 - 139MM HG: CPT | Performed by: INTERNAL MEDICINE

## 2022-02-11 RX ORDER — BUTALBITAL, ACETAMINOPHEN AND CAFFEINE 50; 325; 40 MG/1; MG/1; MG/1
1 TABLET ORAL EVERY 4 HOURS PRN
Qty: 30 TABLET | Refills: 2 | Status: SHIPPED | OUTPATIENT
Start: 2022-02-11 | End: 2022-05-12

## 2022-02-11 NOTE — PROGRESS NOTES
Assessment/Plan:       Diagnoses and all orders for this visit:    Episodic paroxysmal hemicrania, not intractable  -     Sedimentation rate, automated; Future  -     Cancel: Cyclic citrul peptide antibody, IgG; Future  -     Sedimentation rate, automated  -     Cyclic citrul peptide antibody, IgG  -     C-reactive protein; Future  -     butalbital-acetaminophen-caffeine (FIORICET,ESGIC) -40 mg per tablet; Take 1 tablet by mouth every 4 (four) hours as needed for headaches Take 2 tabs at onset of HA and q4h prn   -     C-reactive protein          All of the above diagnoses have been assessed  Additional COMMENTS/PLAN: will r/o TA--will treat as migraine  Subjective:      Patient ID: Sandhya Rivas is a 61 y o  female  HPI     Has pain in the left temple area for many months  This comes and goes  This may affect vision  This may be present for a whole day and then liz for a few days  Sometimes sharp  Has scalp tenderness  No pain with chewing  Has nausea with this  Has migraine in past          The following portions of the patient's history were revised and updated as appropriate: Problem list, allergies, med list, FH, SH, Past medical and surgical histories      Current Outpatient Medications   Medication Sig Dispense Refill    amLODIPine (NORVASC) 5 mg tablet Take 1 tablet (5 mg total) by mouth daily 90 tablet 3    ascorbic acid (VITAMIN C) 500 mg tablet Take 1 tablet (500 mg total) by mouth 2 (two) times a day 60 tablet 0    aspirin 81 MG tablet Take 1 tablet by mouth daily      diclofenac potassium (CATAFLAM) 50 mg tablet Take 1 tablet (50 mg total) by mouth 3 (three) times a day 270 tablet 3    fluticasone (FLONASE) 50 mcg/act nasal spray 2 sprays into each nostril daily 1 g 5    glucose blood (FREESTYLE LITE) test strip TEST FOUR TIMES A  each 5    glucose monitoring kit (FREESTYLE) monitoring kit Testing 4 times daily - DX- E11 9 1 each 0    insulin aspart (NOVOLOG FLEXPEN) 100 Units/mL injection pen Inject 20 Units under the skin 3 (three) times a day with meals Before meals or sliding 10 pen 5    Insulin Syringe-Needle U-100 (B-D INS SYR ULTRAFINE  3CC/31G) 31G X 5/16" 0 3 ML MISC by Does not apply route      Lantus SoloStar 100 units/mL injection pen 30 units hs 15 mL 11    levothyroxine 100 mcg tablet Take 1 tablet (100 mcg total) by mouth daily 90 tablet 3    metoprolol tartrate (LOPRESSOR) 50 mg tablet TAKE 1 TABLET BY MOUTH  TWICE DAILY 180 tablet 3    omeprazole (PriLOSEC) 20 mg delayed release capsule Take 1 capsule (20 mg total) by mouth daily 90 capsule 3    simvastatin (ZOCOR) 20 mg tablet Take 1 tablet (20 mg total) by mouth daily 90 tablet 3    traMADol (ULTRAM) 50 mg tablet Take 1 tablet (50 mg total) by mouth 3 (three) times a day as needed for moderate pain 90 tablet 3    valsartan-hydrochlorothiazide (DIOVAN-HCT) 160-25 MG per tablet Take 1 tablet by mouth daily 90 tablet 3    butalbital-acetaminophen-caffeine (FIORICET,ESGIC) -40 mg per tablet Take 1 tablet by mouth every 4 (four) hours as needed for headaches Take 2 tabs at onset of HA and q4h prn  30 tablet 2     No current facility-administered medications for this visit  Review of Systems      Objective:    /80 (BP Location: Left arm, Patient Position: Sitting, Cuff Size: Standard)   Pulse 62   Ht 5' 1" (1 549 m)   Wt 85 3 kg (188 lb)   LMP  (LMP Unknown)   SpO2 98%   BMI 35 52 kg/m²     BP Readings from Last 3 Encounters:   02/11/22 136/80   01/14/22 130/80   11/22/21 128/78                  Wt Readings from Last 3 Encounters:   02/11/22 85 3 kg (188 lb)   01/14/22 85 5 kg (188 lb 6 4 oz)   11/22/21 90 3 kg (199 lb)         Physical Exam  Vitals reviewed  HENT:      Head:      Comments: There is point tenderness of the parietal area of the scalp  ? TA area  No visits with results within 2 Week(s) from this visit     Latest known visit with results is:   Orders Only on 11/09/2021   Component Date Value Ref Range Status    SARS-CoV-2 11/09/2021 Positive* Negative Final         Wilfred Townsend MD    Some or all of this note was generated with a voice recognition dictation system and therefore my contain grammatical or spelling errors

## 2022-02-15 LAB
CRP SERPL-MCNC: 7 MG/L (ref 0–10)
ERYTHROCYTE [DISTWIDTH] IN BLOOD BY AUTOMATED COUNT: 13.1 % (ref 11.7–15.4)
ERYTHROCYTE [SEDIMENTATION RATE] IN BLOOD BY WESTERGREN METHOD: 34 MM/HR (ref 0–40)
HCT VFR BLD AUTO: 39.5 % (ref 34–46.6)
HGB BLD-MCNC: 12.8 G/DL (ref 11.1–15.9)
MCH RBC QN AUTO: 27.8 PG (ref 26.6–33)
MCHC RBC AUTO-ENTMCNC: 32.4 G/DL (ref 31.5–35.7)
MCV RBC AUTO: 86 FL (ref 79–97)
PLATELET # BLD AUTO: 294 X10E3/UL (ref 150–450)
RBC # BLD AUTO: 4.61 X10E6/UL (ref 3.77–5.28)
TSH SERPL DL<=0.005 MIU/L-ACNC: 0.04 UIU/ML (ref 0.45–4.5)
WBC # BLD AUTO: 14.4 X10E3/UL (ref 3.4–10.8)

## 2022-02-22 LAB
ALBUMIN SERPL-MCNC: 4.2 G/DL (ref 3.8–4.8)
ALBUMIN/CREAT UR: 3 MG/G CREAT (ref 0–29)
ALBUMIN/GLOB SERPL: 1.4 {RATIO} (ref 1.2–2.2)
ALP SERPL-CCNC: 123 IU/L (ref 44–121)
ALT SERPL-CCNC: 8 IU/L (ref 0–32)
AST SERPL-CCNC: 14 IU/L (ref 0–40)
BILIRUB SERPL-MCNC: 0.4 MG/DL (ref 0–1.2)
BUN SERPL-MCNC: 14 MG/DL (ref 8–27)
BUN/CREAT SERPL: 17 (ref 12–28)
CALCIUM SERPL-MCNC: 9.3 MG/DL (ref 8.7–10.3)
CHLORIDE SERPL-SCNC: 99 MMOL/L (ref 96–106)
CHOLEST SERPL-MCNC: 136 MG/DL (ref 100–199)
CO2 SERPL-SCNC: 27 MMOL/L (ref 20–29)
CREAT SERPL-MCNC: 0.81 MG/DL (ref 0.57–1)
CREAT UR-MCNC: 121.9 MG/DL
EST. AVERAGE GLUCOSE BLD GHB EST-MCNC: 128 MG/DL
GLOBULIN SER-MCNC: 3 G/DL (ref 1.5–4.5)
GLUCOSE SERPL-MCNC: 127 MG/DL (ref 65–99)
HBA1C MFR BLD: 6.1 % (ref 4.8–5.6)
HDLC SERPL-MCNC: 43 MG/DL
LDLC SERPL CALC-MCNC: 71 MG/DL (ref 0–99)
LDLC/HDLC SERPL: 1.7 RATIO (ref 0–3.2)
MICROALBUMIN UR-MCNC: 3.7 UG/ML
POTASSIUM SERPL-SCNC: 3.8 MMOL/L (ref 3.5–5.2)
PROT SERPL-MCNC: 7.2 G/DL (ref 6–8.5)
SL AMB EGFR AFRICAN AMERICAN: 89 ML/MIN/1.73
SL AMB EGFR NON AFRICAN AMERICAN: 77 ML/MIN/1.73
SL AMB VLDL CHOLESTEROL CALC: 22 MG/DL (ref 5–40)
SODIUM SERPL-SCNC: 139 MMOL/L (ref 134–144)
TRIGL SERPL-MCNC: 121 MG/DL (ref 0–149)

## 2022-02-22 PROCEDURE — 3061F NEG MICROALBUMINURIA REV: CPT | Performed by: INTERNAL MEDICINE

## 2022-02-22 PROCEDURE — 3044F HG A1C LEVEL LT 7.0%: CPT | Performed by: INTERNAL MEDICINE

## 2022-02-28 ENCOUNTER — TELEPHONE (OUTPATIENT)
Dept: FAMILY MEDICINE CLINIC | Facility: HOSPITAL | Age: 67
End: 2022-02-28

## 2022-02-28 DIAGNOSIS — R51.9 INTRACTABLE HEADACHE, UNSPECIFIED CHRONICITY PATTERN, UNSPECIFIED HEADACHE TYPE: Primary | ICD-10-CM

## 2022-02-28 NOTE — TELEPHONE ENCOUNTER
Patient states that she still has pain in the left side of her head everyday  She said it is not a headache, not the same kind of pain  She states that she sometimes has numbness and then  a pins and needles type feeling that radiates along her cheek to her mouth  She also reports sometimes feeling off balance  She said all of her blood work came back normal  So she is asking what the next step would be  MRI? Neurology? She did state that her daughter in law is going to see Neuro but there was a 6 month wait time and she does not think she can wait that long  Please advise

## 2022-03-01 ENCOUNTER — TELEPHONE (OUTPATIENT)
Dept: FAMILY MEDICINE CLINIC | Facility: HOSPITAL | Age: 67
End: 2022-03-01

## 2022-03-01 ENCOUNTER — HOSPITAL ENCOUNTER (OUTPATIENT)
Dept: MAMMOGRAPHY | Facility: IMAGING CENTER | Age: 67
Discharge: HOME | End: 2022-03-01

## 2022-03-01 VITALS — HEIGHT: 61 IN | WEIGHT: 187.39 LBS | BODY MASS INDEX: 35.38 KG/M2

## 2022-03-01 DIAGNOSIS — N63.0 LUMP IN FEMALE BREAST: Primary | ICD-10-CM

## 2022-03-01 DIAGNOSIS — Z12.39 SCREENING BREAST EXAMINATION: ICD-10-CM

## 2022-03-02 DIAGNOSIS — M17.11 PRIMARY OSTEOARTHRITIS OF RIGHT KNEE: ICD-10-CM

## 2022-03-02 DIAGNOSIS — E11.9 TYPE 2 DIABETES MELLITUS WITHOUT COMPLICATION, WITHOUT LONG-TERM CURRENT USE OF INSULIN (HCC): ICD-10-CM

## 2022-03-02 DIAGNOSIS — I10 BENIGN ESSENTIAL HYPERTENSION: ICD-10-CM

## 2022-03-02 DIAGNOSIS — E03.9 ACQUIRED HYPOTHYROIDISM: ICD-10-CM

## 2022-03-02 RX ORDER — LEVOTHYROXINE SODIUM 0.1 MG/1
100 TABLET ORAL DAILY
Qty: 90 TABLET | Refills: 3 | Status: SHIPPED | OUTPATIENT
Start: 2022-03-02

## 2022-03-02 RX ORDER — SIMVASTATIN 20 MG
20 TABLET ORAL DAILY
Qty: 90 TABLET | Refills: 3 | Status: SHIPPED | OUTPATIENT
Start: 2022-03-02

## 2022-03-02 RX ORDER — DICLOFENAC POTASSIUM 50 MG/1
50 TABLET, FILM COATED ORAL 3 TIMES DAILY
Qty: 270 TABLET | Refills: 3 | Status: SHIPPED | OUTPATIENT
Start: 2022-03-02

## 2022-03-02 RX ORDER — METOPROLOL TARTRATE 50 MG/1
50 TABLET, FILM COATED ORAL 2 TIMES DAILY
Qty: 180 TABLET | Refills: 3 | Status: SHIPPED | OUTPATIENT
Start: 2022-03-02

## 2022-03-02 RX ORDER — VALSARTAN AND HYDROCHLOROTHIAZIDE 160; 25 MG/1; MG/1
1 TABLET ORAL DAILY
Qty: 90 TABLET | Refills: 3 | Status: SHIPPED | OUTPATIENT
Start: 2022-03-02

## 2022-03-02 RX ORDER — AMLODIPINE BESYLATE 5 MG/1
5 TABLET ORAL DAILY
Qty: 90 TABLET | Refills: 3 | Status: SHIPPED | OUTPATIENT
Start: 2022-03-02

## 2022-03-02 RX ORDER — INSULIN GLARGINE 100 [IU]/ML
INJECTION, SOLUTION SUBCUTANEOUS
Qty: 15 ML | Refills: 11 | Status: SHIPPED | OUTPATIENT
Start: 2022-03-02

## 2022-03-03 ENCOUNTER — HOSPITAL ENCOUNTER (OUTPATIENT)
Dept: MAMMOGRAPHY | Facility: CLINIC | Age: 67
Discharge: HOME | End: 2022-03-03
Payer: MEDICARE

## 2022-03-03 ENCOUNTER — HOSPITAL ENCOUNTER (OUTPATIENT)
Dept: ULTRASOUND IMAGING | Facility: CLINIC | Age: 67
Discharge: HOME | End: 2022-03-03
Payer: MEDICARE

## 2022-03-03 VITALS — WEIGHT: 187 LBS | HEIGHT: 61 IN | BODY MASS INDEX: 35.3 KG/M2

## 2022-03-03 DIAGNOSIS — N63.0 LUMP IN FEMALE BREAST: ICD-10-CM

## 2022-03-03 DIAGNOSIS — N63.0 BREAST LUMP: ICD-10-CM

## 2022-03-03 PROCEDURE — G0279 TOMOSYNTHESIS, MAMMO: HCPCS

## 2022-03-03 PROCEDURE — 76642 ULTRASOUND BREAST LIMITED: CPT

## 2022-03-03 PROCEDURE — 77066 DX MAMMO INCL CAD BI: CPT

## 2022-03-07 DIAGNOSIS — E11.9 TYPE 2 DIABETES MELLITUS WITHOUT COMPLICATION, WITHOUT LONG-TERM CURRENT USE OF INSULIN (HCC): Primary | ICD-10-CM

## 2022-03-07 RX ORDER — INSULIN LISPRO 100 [IU]/ML
INJECTION, SOLUTION SUBCUTANEOUS
Qty: 15 ML | Refills: 3 | Status: SHIPPED | OUTPATIENT
Start: 2022-03-07 | End: 2022-04-05

## 2022-03-08 ENCOUNTER — TELEPHONE (OUTPATIENT)
Dept: FAMILY MEDICINE CLINIC | Facility: HOSPITAL | Age: 67
End: 2022-03-08

## 2022-03-08 ENCOUNTER — HOSPITAL ENCOUNTER (OUTPATIENT)
Dept: CT IMAGING | Facility: HOSPITAL | Age: 67
Discharge: HOME | End: 2022-03-08
Attending: INTERNAL MEDICINE
Payer: MEDICARE

## 2022-03-08 DIAGNOSIS — R51.9 INTRACTABLE HEADACHE, UNSPECIFIED CHRONICITY PATTERN, UNSPECIFIED HEADACHE TYPE: ICD-10-CM

## 2022-03-08 PROCEDURE — 70450 CT HEAD/BRAIN W/O DYE: CPT

## 2022-03-14 ENCOUNTER — TELEPHONE (OUTPATIENT)
Dept: FAMILY MEDICINE CLINIC | Facility: HOSPITAL | Age: 67
End: 2022-03-14

## 2022-03-14 NOTE — TELEPHONE ENCOUNTER
[facility] - since I had a request for Humalog - the [facility] pen is fine for Surendra to use - it has half dose on it  I then called Surendra and she will use this shipment - but requested I order the regular Humalog pen for her next refill  Will send in a new script and cancel the one on file for the [facility] pen

## 2022-04-05 ENCOUNTER — TELEPHONE (OUTPATIENT)
Dept: FAMILY MEDICINE CLINIC | Facility: HOSPITAL | Age: 67
End: 2022-04-05

## 2022-04-05 DIAGNOSIS — K21.9 GASTROESOPHAGEAL REFLUX DISEASE WITHOUT ESOPHAGITIS: ICD-10-CM

## 2022-04-05 DIAGNOSIS — E11.9 TYPE 2 DIABETES MELLITUS WITHOUT COMPLICATION, WITHOUT LONG-TERM CURRENT USE OF INSULIN (HCC): Primary | ICD-10-CM

## 2022-04-05 RX ORDER — OMEPRAZOLE 20 MG/1
20 CAPSULE, DELAYED RELEASE ORAL DAILY
Qty: 90 CAPSULE | Refills: 3 | Status: SHIPPED | OUTPATIENT
Start: 2022-04-05

## 2022-04-05 RX ORDER — INSULIN LISPRO 100 [IU]/ML
INJECTION, SOLUTION INTRAVENOUS; SUBCUTANEOUS
Qty: 15 ML | Refills: 3 | Status: SHIPPED | OUTPATIENT
Start: 2022-04-05

## 2022-04-05 NOTE — TELEPHONE ENCOUNTER
Pt left VM on machine stating PCP was going to prescribe the Humalog and   20 units a day under skin and not the Humalog Jr  Also states that medicate will pay for omeprazole now

## 2022-04-20 DIAGNOSIS — E11.9 TYPE 2 DIABETES MELLITUS WITHOUT COMPLICATION, WITHOUT LONG-TERM CURRENT USE OF INSULIN (HCC): Primary | ICD-10-CM

## 2022-04-21 RX ORDER — BLOOD-GLUCOSE METER
EACH MISCELLANEOUS 4 TIMES DAILY
Qty: 1 KIT | Refills: 0 | Status: SHIPPED | OUTPATIENT
Start: 2022-04-21

## 2022-04-27 ENCOUNTER — VBI (OUTPATIENT)
Dept: ADMINISTRATIVE | Facility: OTHER | Age: 67
End: 2022-04-27

## 2022-05-02 DIAGNOSIS — E11.9 TYPE 2 DIABETES MELLITUS WITHOUT COMPLICATION, WITHOUT LONG-TERM CURRENT USE OF INSULIN (HCC): Primary | ICD-10-CM

## 2022-05-02 RX ORDER — LANCETS 33 GAUGE
EACH MISCELLANEOUS
Qty: 200 EACH | Refills: 5 | Status: SHIPPED | OUTPATIENT
Start: 2022-05-02

## 2022-05-02 RX ORDER — BLOOD SUGAR DIAGNOSTIC
STRIP MISCELLANEOUS
Qty: 150 STRIP | Refills: 5 | Status: SHIPPED | OUTPATIENT
Start: 2022-05-02 | End: 2022-05-20 | Stop reason: SDUPTHER

## 2022-05-11 ENCOUNTER — TELEPHONE (OUTPATIENT)
Dept: FAMILY MEDICINE CLINIC | Facility: HOSPITAL | Age: 67
End: 2022-05-11

## 2022-05-12 ENCOUNTER — OFFICE VISIT (OUTPATIENT)
Dept: FAMILY MEDICINE CLINIC | Facility: HOSPITAL | Age: 67
End: 2022-05-12
Payer: MEDICARE

## 2022-05-12 VITALS
DIASTOLIC BLOOD PRESSURE: 80 MMHG | SYSTOLIC BLOOD PRESSURE: 140 MMHG | HEART RATE: 61 BPM | BODY MASS INDEX: 35.68 KG/M2 | HEIGHT: 61 IN | OXYGEN SATURATION: 98 % | WEIGHT: 189 LBS

## 2022-05-12 DIAGNOSIS — N60.02 CYST, BREAST SOLITARY, LEFT: Primary | ICD-10-CM

## 2022-05-12 DIAGNOSIS — M17.0 PRIMARY OSTEOARTHRITIS OF BOTH KNEES: ICD-10-CM

## 2022-05-12 DIAGNOSIS — F11.20 CONTINUOUS OPIOID DEPENDENCE (HCC): ICD-10-CM

## 2022-05-12 DIAGNOSIS — I73.9 PAD (PERIPHERAL ARTERY DISEASE) (HCC): ICD-10-CM

## 2022-05-12 PROCEDURE — 99213 OFFICE O/P EST LOW 20 MIN: CPT | Performed by: STUDENT IN AN ORGANIZED HEALTH CARE EDUCATION/TRAINING PROGRAM

## 2022-05-12 RX ORDER — TRAMADOL HYDROCHLORIDE 50 MG/1
50 TABLET ORAL 2 TIMES DAILY PRN
Qty: 60 TABLET | Refills: 0 | Status: SHIPPED | OUTPATIENT
Start: 2022-05-12 | End: 2022-06-13 | Stop reason: SDUPTHER

## 2022-05-12 NOTE — PROGRESS NOTES
[facility],     Assessment/Plan:      Diagnosis ICD-10-CM Associated Orders   1  Cyst, breast solitary, left  N60 02 Ambulatory Referral to Dermatology   2  Continuous opioid dependence ([facility] [address] )  F11 20    3  PAD (peripheral artery disease) (HCC)  I73 9    4  Primary osteoarthritis of both knees  M17 0 traMADol (ULTRAM) 50 mg tablet      Referral provided for dermatology for left breast cysts   Tramadol ordered as above  Return in 2 months (on 7/12/2022) for Annual Medicare Wellness   Patient may call or return to office with any questions or concerns  ______________________________________________________________________  Subjective:     Patient ID: Markell Oliver is a 74 y o  female  HPI  Markell Oliver  Chief Complaint   Patient presents with    cyst on breast     Just had mammo on 3/3/22 - normal except small cysts  Wanted to have them evaluated after having mammogram and ultrasound done  One has gotten slightly larger, not exquisitely painful and not leaking discharge  [facility] - just had vision exam & will be getting cataracts done  Also notes ongoing bilateral knee pain       The following portions of the patient's history were reviewed and updated as appropriate: allergies, current medications, past medical history and problem list     Review of Systems   Constitutional: Negative for chills, fever and unexpected weight change  Respiratory: Negative for cough and shortness of breath  Cardiovascular: Negative for chest pain and palpitations  Objective:      Vitals:    05/12/22 0953   BP: 140/80   Pulse: 61   SpO2: 98%      Physical Exam  Vitals reviewed  Constitutional:       General: She is not in acute distress  Appearance: Normal appearance  She is well-developed  She is obese  She is not ill-appearing  HENT:      Head: Normocephalic and atraumatic  Eyes:      General: No scleral icterus          Right eye: No discharge  Left eye: No discharge  Cardiovascular:      Rate and Rhythm: Normal rate and regular rhythm  Pulses: Normal pulses  Heart sounds: Normal heart sounds  No murmur heard  No friction rub  Pulmonary:      Effort: Pulmonary effort is normal  No respiratory distress  Breath sounds: Normal breath sounds  No stridor  No wheezing  Chest:      Chest wall: No lacerations or tenderness  Comments: No Erythema or rash  Musculoskeletal:      Cervical back: Normal range of motion  Skin:     General: Skin is warm  Neurological:      Mental Status: She is alert and oriented to person, place, and time  Psychiatric:         Thought Content: Thought content normal            Portions of the record may have been created with voice recognition software  Occasional wrong word or "sound alike" substitutions may have occurred due to the inherent limitations of voice recognition software  Please review the chart carefully and recognize, using context, where substitutions/typographical errors may have occurred

## 2022-05-20 DIAGNOSIS — E11.9 TYPE 2 DIABETES MELLITUS WITHOUT COMPLICATION, WITHOUT LONG-TERM CURRENT USE OF INSULIN (HCC): ICD-10-CM

## 2022-05-20 RX ORDER — BLOOD SUGAR DIAGNOSTIC
STRIP MISCELLANEOUS
Qty: 400 STRIP | Refills: 3 | Status: SHIPPED | OUTPATIENT
Start: 2022-05-20 | End: 2022-05-24 | Stop reason: SDUPTHER

## 2022-05-23 ENCOUNTER — TELEPHONE (OUTPATIENT)
Dept: ADMINISTRATIVE | Facility: OTHER | Age: 67
End: 2022-05-23

## 2022-05-23 NOTE — LETTER
Diabetic Eye Exam Form    Date Requested: 22  Patient: Nicanor Johnson  Patient : 1955   Referring Provider: Kaity Hammonds MD    2nd Request    DIABETIC Eye Exam Date _______________________________    Type of Exam MUST be documented for Diabetic Eye Exams  Please CHECK ONE  Retinal Exam       Dilated Retinal Exam       OCT       Optomap-Iris Exam      Fundus Photography     Left Eye - Please check Retinopathy AND Type or No Retinopathy      Exam did show retinopathy    Exam did not show retinopathy         Mild     Proliferative           Moderate    Severe            None         Right Eye - Please check Retinopathy AND Type or No Retinopathy     Exam did show retinopathy    Exam did not show retinopathy         Mild     Proliferative        Moderate    Severe        None       Comments __________________________________________________________    Practice Providing Exam ______________________________________________    Exam Performed By (print name) _______________________________________      Provider Signature ___________________________________________________    These reports are needed for  compliance  Please fax this completed form and a copy of the Diabetic Eye Exam report to our office located at [place] [address] as soon as possible via [phone] attention Vee Rey: Phone [phone]  We thank you for your assistance in treating our mutual patient

## 2022-05-23 NOTE — LETTER
Diabetic Eye Exam Form    Date Requested: 22  Patient: Markell Oliver  Patient : 1955   Referring Provider: Latrice Treadwell MD    DIABETIC Eye Exam Date _______________________________    Type of Exam MUST be documented for Diabetic Eye Exams  Please CHECK ONE  Retinal Exam       Dilated Retinal Exam       OCT       Optomap-Iris Exam      Fundus Photography     Left Eye - Please check Retinopathy AND Type or No Retinopathy      Exam did show retinopathy    Exam did not show retinopathy         Mild     Proliferative           Moderate    Severe            None         Right Eye - Please check Retinopathy AND Type or No Retinopathy     Exam did show retinopathy    Exam did not show retinopathy         Mild     Proliferative        Moderate    Severe        None       Comments __________________________________________________________    Practice Providing Exam ______________________________________________    Exam Performed By (print name) _______________________________________      Provider Signature ___________________________________________________    These reports are needed for  compliance  Please fax this completed form and a copy of the Diabetic Eye Exam report to our office located at [place] [address] as soon as possible via [phone] bhanu Hemphill: Phone [phone]  We thank you for your assistance in treating our mutual patient

## 2022-05-23 NOTE — TELEPHONE ENCOUNTER
----- Message from Dorothy Starkey DO sent at 5/20/2022  4:11 PM EDT -----  05/20/22 4:11 PM    Hello, our patient Sabino Cook has had Diabetic Eye Exam completed/performed   Please assist in updating the patient chart by:  [facility] Dr Deb Tenorio  The date of service is recent    Thank you,  Dorothy Starkey DO  [facility] [address]  [address]

## 2022-05-23 NOTE — TELEPHONE ENCOUNTER
Upon review of the In Basket request and the patient's chart, initial outreach has been made via fax, please see Contacts section for details       Thank you  Louann Lug[address] 23 Fax [phone]

## 2022-05-24 DIAGNOSIS — E11.9 TYPE 2 DIABETES MELLITUS WITHOUT COMPLICATION, WITHOUT LONG-TERM CURRENT USE OF INSULIN (HCC): ICD-10-CM

## 2022-05-24 RX ORDER — BLOOD SUGAR DIAGNOSTIC
STRIP MISCELLANEOUS
Qty: 150 STRIP | Refills: 3 | Status: SHIPPED | OUTPATIENT
Start: 2022-05-24

## 2022-05-24 NOTE — TELEPHONE ENCOUNTER
Patient reporting that her test strips can only be sent to [facility] [place]  They cannot be sent to [facility] - per her insurance [employer]  She just got 3 canisters of 50 each last week, so she should be good until June 20'kathleen  I made the change in her med list, as a no print, so that going forward it would be correct

## 2022-05-27 NOTE — TELEPHONE ENCOUNTER
As a follow-up, a second attempt has been made for outreach via fax, please see Contacts section for details      Thank you  Louann [address] Fax [phone]

## 2022-05-31 NOTE — TELEPHONE ENCOUNTER
As a final attempt, a third outreach has been made via telephone call  Please see Contacts section for details  This encounter will be closed and completed by end of day  Should we receive the requested information because of previous outreach attempts, the requested patient's chart will be updated appropriately       Thank you  Debbie Ybarra, [address]

## 2022-06-13 DIAGNOSIS — M17.0 PRIMARY OSTEOARTHRITIS OF BOTH KNEES: ICD-10-CM

## 2022-06-13 RX ORDER — TRAMADOL HYDROCHLORIDE 50 MG/1
50 TABLET ORAL 2 TIMES DAILY PRN
Qty: 60 TABLET | Refills: 3 | Status: SHIPPED | OUTPATIENT
Start: 2022-06-13 | End: 2022-07-11 | Stop reason: SDUPTHER

## 2022-07-11 DIAGNOSIS — M17.0 PRIMARY OSTEOARTHRITIS OF BOTH KNEES: ICD-10-CM

## 2022-07-11 RX ORDER — TRAMADOL HYDROCHLORIDE 50 MG/1
50 TABLET ORAL 2 TIMES DAILY PRN
Qty: 60 TABLET | Refills: 3 | Status: SHIPPED | OUTPATIENT
Start: 2022-07-11 | End: 2023-03-03 | Stop reason: SDUPTHER

## 2022-07-14 ENCOUNTER — RA CDI HCC (OUTPATIENT)
Dept: OTHER | Facility: HOSPITAL | Age: 67
End: 2022-07-14

## 2022-07-14 NOTE — PROGRESS NOTES
E11 51  [id number]  E66 01  [facility]  coding opportunities          Chart Reviewed number of suggestions sent to Provider: 3     Patients Insurance     Medicare Insurance: [employer]

## 2022-08-25 ENCOUNTER — OFFICE VISIT (OUTPATIENT)
Dept: FAMILY MEDICINE CLINIC | Facility: HOSPITAL | Age: 67
End: 2022-08-25
Payer: MEDICARE

## 2022-08-25 VITALS
HEART RATE: 64 BPM | DIASTOLIC BLOOD PRESSURE: 70 MMHG | OXYGEN SATURATION: 97 % | SYSTOLIC BLOOD PRESSURE: 132 MMHG | BODY MASS INDEX: 35.87 KG/M2 | WEIGHT: 190 LBS | HEIGHT: 61 IN

## 2022-08-25 DIAGNOSIS — E78.00 PURE HYPERCHOLESTEROLEMIA: ICD-10-CM

## 2022-08-25 DIAGNOSIS — R42 VERTIGO: ICD-10-CM

## 2022-08-25 DIAGNOSIS — E03.9 ACQUIRED HYPOTHYROIDISM: ICD-10-CM

## 2022-08-25 DIAGNOSIS — I10 BENIGN ESSENTIAL HYPERTENSION: ICD-10-CM

## 2022-08-25 DIAGNOSIS — Z23 NEED FOR PNEUMOCOCCAL VACCINATION: ICD-10-CM

## 2022-08-25 DIAGNOSIS — F33.9 DEPRESSION, RECURRENT (HCC): ICD-10-CM

## 2022-08-25 DIAGNOSIS — E11.9 TYPE 2 DIABETES MELLITUS WITHOUT COMPLICATION, WITHOUT LONG-TERM CURRENT USE OF INSULIN (HCC): Primary | ICD-10-CM

## 2022-08-25 PROBLEM — F11.20 CONTINUOUS OPIOID DEPENDENCE (HCC): Status: RESOLVED | Noted: 2022-05-12 | Resolved: 2022-08-25

## 2022-08-25 LAB — SL AMB POCT HEMOGLOBIN AIC: 6.2 (ref ?–6.5)

## 2022-08-25 PROCEDURE — 83036 HEMOGLOBIN GLYCOSYLATED A1C: CPT | Performed by: INTERNAL MEDICINE

## 2022-08-25 PROCEDURE — G0438 PPPS, INITIAL VISIT: HCPCS | Performed by: INTERNAL MEDICINE

## 2022-08-25 PROCEDURE — 90677 PCV20 VACCINE IM: CPT

## 2022-08-25 PROCEDURE — 99214 OFFICE O/P EST MOD 30 MIN: CPT | Performed by: INTERNAL MEDICINE

## 2022-08-25 PROCEDURE — G0009 ADMIN PNEUMOCOCCAL VACCINE: HCPCS

## 2022-08-25 PROCEDURE — G0402 INITIAL PREVENTIVE EXAM: HCPCS | Performed by: INTERNAL MEDICINE

## 2022-08-25 RX ORDER — MECLIZINE HCL 12.5 MG/1
12.5 TABLET ORAL EVERY 8 HOURS PRN
Qty: 20 TABLET | Refills: 1 | Status: SHIPPED | OUTPATIENT
Start: 2022-08-25

## 2022-08-25 NOTE — PROGRESS NOTES
Assessment/Plan:       Diagnoses and all orders for this visit:    Type 2 diabetes mellitus without complication, without long-term current use of insulin (HCC)  -     POCT hemoglobin A1c  -     Hemoglobin A1C; Future  -     Microalbumin / creatinine urine ratio    Acquired hypothyroidism  -     TSH, 3rd generation; Future    Benign essential hypertension  -     CBC; Future  -     Comprehensive metabolic panel; Future    Pure hypercholesterolemia  -     Lipid Panel with Direct LDL reflex; Future    Vertigo  -     meclizine (ANTIVERT) 12 5 MG tablet; Take 1 tablet (12 5 mg total) by mouth every 8 (eight) hours as needed for dizziness Take TID for three days then prn    Depression, recurrent ([facility] [address] )          All of the above diagnoses have been assessed  Additional COMMENTS/PLAN: apt in 6 months with bW before      Subjective:      Patient ID: Lexi Hernandez is a 74 y o  female  HPI     DM-this is under good control     Hypertension  Patient is here for follow-up of elevated blood pressure  Pt is compliant with medications and salt restriction  Denies lightheadedness  Monitors BP - yes    Has had some dizziness for 5 days  No sense of motion  Resolved by itself  No other assoc sxs  DJD-knees are bad  Uses rare tramadol prn  Retinopathy-has been followed by retinal specialist  Also needs cataract surgery  The following portions of the patient's history were revised and updated as appropriate: Problem list, allergies, med list, FH, SH, Past medical and surgical histories      Current Outpatient Medications   Medication Sig Dispense Refill    amLODIPine (NORVASC) 5 mg tablet Take 1 tablet (5 mg total) by mouth daily 90 tablet 3    aspirin 81 MG tablet Take 1 tablet by mouth daily      Blood Glucose Monitoring Suppl (OneTouch Verio IQ System) w/Device KIT Use 4 (four) times a day 1 kit 0    diclofenac potassium (CATAFLAM) 50 mg tablet Take 1 tablet (50 mg total) by mouth 3 (three) times a day 270 tablet 3    fluticasone (FLONASE) 50 mcg/act nasal spray 2 sprays into each nostril daily 1 g 5    glucose blood (OneTouch Verio) test strip Testing four times daily 150 strip 3    insulin lispro (HumaLOG KwikPen) 100 units/mL injection pen Inject 20 units under the skin -three times a day with meals  - sliding scale 15 mL 3    Insulin Syringe-Needle U-100 31G X 5/16" 0 3 ML MISC by Does not apply route      Lantus SoloStar 100 units/mL injection pen 30 units hs 15 mL 11    levothyroxine 100 mcg tablet Take 1 tablet (100 mcg total) by mouth daily 90 tablet 3    meclizine (ANTIVERT) 12 5 MG tablet Take 1 tablet (12 5 mg total) by mouth every 8 (eight) hours as needed for dizziness Take TID for three days then prn 20 tablet 1    metoprolol tartrate (LOPRESSOR) 50 mg tablet Take 1 tablet (50 mg total) by mouth 2 (two) times a day 180 tablet 3    omeprazole (PriLOSEC) 20 mg delayed release capsule Take 1 capsule (20 mg total) by mouth daily 90 capsule 3    OneTouch Delica Lancets 23X MISC Testing 4 times daily 200 each 5    simvastatin (ZOCOR) 20 mg tablet Take 1 tablet (20 mg total) by mouth daily 90 tablet 3    traMADol (ULTRAM) 50 mg tablet Take 1 tablet (50 mg total) by mouth 2 (two) times a day as needed for moderate pain 60 tablet 3    valsartan-hydrochlorothiazide (DIOVAN-HCT) 160-25 MG per tablet Take 1 tablet by mouth daily 90 tablet 3    glucose monitoring kit (FREESTYLE) monitoring kit Testing 4 times daily - DX- E11 9 1 each 0     No current facility-administered medications for this visit  Review of Systems   All other systems reviewed and are negative          Objective:    /70   Pulse 64   Ht 5' 1" (1 549 m)   Wt 86 2 kg (190 lb)   LMP  (LMP Unknown)   SpO2 97%   BMI 35 90 kg/m²     BP Readings from Last 3 Encounters:   08/25/22 132/70   05/12/22 140/80   02/11/22 136/80                  Wt Readings from Last 3 Encounters:   08/25/22 86 2 kg (190 lb)   05/12/22 85 7 kg (189 lb)   03/03/22 84 8 kg (187 lb)         Physical Exam  Vitals reviewed  Constitutional:       Appearance: Normal appearance  Neck:      Vascular: Carotid bruit present  Cardiovascular:      Rate and Rhythm: Normal rate and regular rhythm  Pulses: Pulses are weak  Dorsalis pedis pulses are 0 on the right side and 0 on the left side  Posterior tibial pulses are 1+ on the right side and 1+ on the left side  Pulmonary:      Effort: Pulmonary effort is normal       Breath sounds: Normal breath sounds  Abdominal:      General: Abdomen is flat  Bowel sounds are normal       Palpations: Abdomen is soft  Musculoskeletal:      Right lower leg: No edema  Left lower leg: No edema  Comments: djd changes   Feet:      Right foot:      Skin integrity: No ulcer, skin breakdown, erythema, warmth, callus or dry skin  Left foot:      Skin integrity: No ulcer, skin breakdown, erythema, warmth, callus or dry skin  Skin:     General: Skin is warm and dry  Neurological:      Mental Status: She is alert  Patient's shoes and socks removed  Right Foot/Ankle   Right Foot Inspection  Skin Exam: skin normal and skin intact  No dry skin, no warmth, no callus, no erythema, no maceration, no abnormal color, no pre-ulcer, no ulcer and no callus  Toe Exam: ROM and strength within normal limits  Sensory   Monofilament testing: intact    Vascular  The right DP pulse is 0  The right PT pulse is 1+  Left Foot/Ankle  Left Foot Inspection  Skin Exam: skin normal and skin intact  No dry skin, no warmth, no erythema, no maceration, normal color, no pre-ulcer, no ulcer and no callus  Toe Exam: ROM and strength within normal limits  Sensory   Monofilament testing: intact    Vascular  The left DP pulse is 0  The left PT pulse is 1+       Assign Risk Category  No deformity present  No loss of protective sensation  Weak pulses  Risk: 1      Office Visit on 08/25/2022   Component Date Value Ref Range Status    Hemoglobin A1C 08/25/2022 6 2  6 5 Final         Cristian Murray MD    Some or all of this note was generated with a voice recognition dictation system and therefore my contain grammatical or spelling errors

## 2022-08-25 NOTE — PROGRESS NOTES
Assessment and Plan:     Problem List Items Addressed This Visit    None     Visit Diagnoses     Medicare annual wellness visit, subsequent    -  Primary           Preventive health issues were discussed with patient, and age appropriate screening tests were ordered as noted in patient's After Visit Summary  Personalized health advice and appropriate referrals for health education or preventive services given if needed, as noted in patient's After Visit Summary       History of Present Illness:     Patient presents for a Medicare Wellness Visit    HPI   Patient Care Team:  Eriberto Hilliard MD as PCP - General  Luis Fernando Smith DO as PCP - Endocrinology (Endocrinology)  Eriberto Hilliard MD     Review of Systems:     Review of Systems     Problem List:     Patient Active Problem List   Diagnosis    Anxiety    Benign essential hypertension    Acquired hypothyroidism    Type 2 diabetes mellitus without complication, without long-term current use of insulin ([address] )    Lumbar stenosis with neurogenic claudication    Sjogrens syndrome ([address] )    Thyromegaly    Osteoarthritis of right knee    Pure hypercholesterolemia    Primary osteoarthritis of right knee    Gastroesophageal reflux disease without esophagitis    PAD (peripheral artery disease) ([address] )    Grief reaction    Continuous opioid dependence ([address] )      Past Medical and Surgical History:     Past Medical History:   Diagnosis Date    History of total left knee replacement (TKR) 2013    Obesity     TMJ (dislocation of temporomandibular joint)     Trigeminal neuralgia      Past Surgical History:   Procedure Laterality Date    BREAST BIOPSY Right 2018    CARDIAC CATHETERIZATION      HYSTERECTOMY      OOPHORECTOMY      TONSILLECTOMY      US GUIDED BREAST BIOPSY RIGHT COMPLETE Right 9/5/2018      Family History:     Family History   Problem Relation Age of Onset    Colon cancer Mother         63's    Dementia Father     Depression Father     Diabetes Father     Stroke Father     Parkinsonism Father     GI problems Family         GI malignancy    No Known Problems Sister     No Known Problems Maternal Grandmother     No Known Problems Maternal Grandfather     No Known Problems Paternal Grandmother     No Known Problems Paternal Grandfather     No Known Problems Sister     No Known Problems Sister     No Known Problems Maternal Aunt     No Known Problems Maternal Aunt     No Known Problems Maternal Aunt     No Known Problems Maternal Aunt     No Known Problems Maternal Aunt     Breast cancer Paternal Aunt         52's    No Known Problems Paternal Aunt     No Known Problems Paternal Aunt     No Known Problems Paternal Aunt     Endometrial cancer Neg Hx     Ovarian cancer Neg Hx       Social History:     Social History     Socioeconomic History    Marital status:      Spouse name: None    Number of children: None    Years of education: None    Highest education level: None   Occupational History    None   Tobacco Use    Smoking status: Never Smoker    Smokeless tobacco: Never Used   Vaping Use    Vaping Use: Never used   Substance and Sexual Activity    Alcohol use: No    Drug use: No    Sexual activity: Not Currently   Other Topics Concern    None   Social History Narrative    Caffeine use: 1-2 times per week    Dental care, regularly    Living situation: supportive and safe    No advance directive on file         Social Determinants of Health     Financial Resource Strain: Low Risk     Difficulty of Paying Living Expenses: Not hard at all   Food Insecurity: Not on file   Transportation Needs: No Transportation Needs    Lack of Transportation (Medical): No    Lack of Transportation (Non-Medical):  No   Physical Activity: Not on file   Stress: Not on file   Social Connections: Not on file   Intimate Partner Violence: Not on file   Housing Stability: Not on file      Medications and Allergies:     Current Outpatient Medications   Medication Sig Dispense Refill    amLODIPine (NORVASC) 5 mg tablet Take 1 tablet (5 mg total) by mouth daily 90 tablet 3    aspirin 81 MG tablet Take 1 tablet by mouth daily      Blood Glucose Monitoring Suppl (OneTouch Verio IQ System) w/Device KIT Use 4 (four) times a day 1 kit 0    diclofenac potassium (CATAFLAM) 50 mg tablet Take 1 tablet (50 mg total) by mouth 3 (three) times a day 270 tablet 3    fluticasone (FLONASE) 50 mcg/act nasal spray 2 sprays into each nostril daily 1 g 5    glucose blood (OneTouch Verio) test strip Testing four times daily 150 strip 3    insulin lispro (HumaLOG KwikPen) 100 units/mL injection pen Inject 20 units under the skin -three times a day with meals  - sliding scale 15 mL 3    Insulin Syringe-Needle U-100 31G X 5/16" 0 3 ML MISC by Does not apply route      Lantus SoloStar 100 units/mL injection pen 30 units hs 15 mL 11    levothyroxine 100 mcg tablet Take 1 tablet (100 mcg total) by mouth daily 90 tablet 3    metoprolol tartrate (LOPRESSOR) 50 mg tablet Take 1 tablet (50 mg total) by mouth 2 (two) times a day 180 tablet 3    omeprazole (PriLOSEC) 20 mg delayed release capsule Take 1 capsule (20 mg total) by mouth daily 90 capsule 3    OneTouch Delica Lancets 36I MISC Testing 4 times daily 200 each 5    simvastatin (ZOCOR) 20 mg tablet Take 1 tablet (20 mg total) by mouth daily 90 tablet 3    traMADol (ULTRAM) 50 mg tablet Take 1 tablet (50 mg total) by mouth 2 (two) times a day as needed for moderate pain 60 tablet 3    valsartan-hydrochlorothiazide (DIOVAN-HCT) 160-25 MG per tablet Take 1 tablet by mouth daily 90 tablet 3    glucose monitoring kit (FREESTYLE) monitoring kit Testing 4 times daily - DX- E11 9 1 each 0     No current facility-administered medications for this visit       Allergies   Allergen Reactions    Niacin Anaphylaxis     Annotation - [id number]: Flushing    Ciprofloxacin     Pioglitazone Hives    Cefprozil Rash    Cefuroxime Rash    Ramipril Rash      Immunizations:     Immunization History   Administered Date(s) Administered    COVID-19 MODERNA VACC 0 5 ML IM 02/12/2021, 03/11/2021    INFLUENZA 09/30/2018, 10/21/2021    Influenza Quadrivalent Preservative Free 3 years and older IM 10/16/2014    Influenza, recombinant, quadrivalent,injectable, preservative free 10/15/2019    Influenza, seasonal, injectable 10/15/2013    Influenza, seasonal, injectable, preservative free 10/06/2015    Pneumococcal Polysaccharide PPV23 01/01/2006    Tdap 10/15/2013    influenza, injectable, quadrivalent 10/08/2020      Health Maintenance:         Topic Date Due    HIV Screening  05/12/2024 (Originally 3/31/1970)    Breast Cancer Screening: Mammogram  03/03/2023    Colorectal Cancer Screening  05/02/2026    Hepatitis C Screening  Completed         Topic Date Due    Pneumococcal Vaccine: 65+ Years (2 - PCV) 01/01/2007    COVID-19 Vaccine (3 - Booster for Moderna series) 08/11/2021    Influenza Vaccine (1) 09/01/2022      Medicare Screening Tests and Risk Assessments:     Selam Wiley is here for her Initial Wellness visit  Health Risk Assessment:   Patient rates overall health as very good  Patient feels that their physical health rating is much better  Patient is satisfied with their life  Eyesight was rated as slightly worse  Hearing was rated as same  Patient feels that their emotional and mental health rating is much worse  Patients states they are sometimes angry  Patient states they are sometimes unusually tired/fatigued  Pain experienced in the last 7 days has been none  Patient states that she has experienced weight loss or gain in last 6 months  Depression Screening:   PHQ-2 Score: 6  PHQ-9 Score: 13      Fall Risk Screening:    In the past year, patient has experienced: history of falling in past year    Number of falls: 2 or more  Injured during fall?: No    Feels unsteady when standing or walking?: No    Worried about falling?: No      Urinary Incontinence Screening:   Patient has not leaked urine accidently in the last six months  Home Safety:  Patient does not have trouble with stairs inside or outside of their home  Patient has working smoke alarms and has working carbon monoxide detector  Home safety hazards include: none  Nutrition:   Current diet is Regular  Medications:   Patient is currently taking over-the-counter supplements  OTC medications include: see medication list  Patient is able to manage medications  Activities of Daily Living (ADLs)/Instrumental Activities of Daily Living (IADLs):   Walk and transfer into and out of bed and chair?: Yes  Dress and groom yourself?: Yes    Bathe or shower yourself?: Yes    Feed yourself? Yes  Do your laundry/housekeeping?: Yes  Manage your money, pay your bills and track your expenses?: Yes  Make your own meals?: Yes    Do your own shopping?: Yes    Previous Hospitalizations:   Any hospitalizations or ED visits within the last 12 months?: No      Advance Care Planning:   Living will: Yes    Durable POA for healthcare:  Yes    Advanced directive: Yes      Cognitive Screening:   Provider or family/friend/caregiver concerned regarding cognition?: No    PREVENTIVE SCREENINGS      Cardiovascular Screening:    General: Screening Not Indicated and History Lipid Disorder      Diabetes Screening:     General: Screening Not Indicated and History Diabetes      Colorectal Cancer Screening:     General: Screening Current      Breast Cancer Screening:     General: Screening Current      Cervical Cancer Screening:    General: Screening Not Indicated      Osteoporosis Screening:    General: Screening Not Indicated      Abdominal Aortic Aneurysm (AAA) Screening:        General: Screening Not Indicated      Lung Cancer Screening:     General: Screening Not Indicated      Hepatitis C Screening:    General: Screening Current      Preventive Screening Comments: Exercise-not much-some walking  vacines-should have Covid booster  Shingles shot    Screening, Brief Intervention, and Referral to Treatment (SBIRT)    Screening  Typical number of drinks in a day: 0    Single Item Drug Screening:  How often have you used an illegal drug (including marijuana) or a prescription medication for non-medical reasons in the past year? never    Single Item Drug Screen Score: 0  Interpretation: Negative screen for possible drug use disorder    Review of Current Opioid Use    Opioid Risk Tool (ORT) Interpretation: Complete Opioid Risk Tool (ORT)    No exam data present     Physical Exam:     /70   Pulse 64   Ht 5' 1" (1 549 m)   Wt 86 2 kg (190 lb)   LMP  (LMP Unknown)   SpO2 97%   BMI 35 90 kg/m²     Physical Exam     Mayo Antony MD

## 2022-10-31 ENCOUNTER — TELEPHONE (OUTPATIENT)
Dept: FAMILY MEDICINE CLINIC | Facility: HOSPITAL | Age: 67
End: 2022-10-31

## 2022-11-14 DIAGNOSIS — J31.0 CHRONIC RHINITIS: ICD-10-CM

## 2022-11-14 RX ORDER — FLUTICASONE PROPIONATE 50 MCG
2 SPRAY, SUSPENSION (ML) NASAL DAILY
Qty: 1 G | Refills: 5 | Status: SHIPPED | OUTPATIENT
Start: 2022-11-14

## 2022-11-21 ENCOUNTER — TELEPHONE (OUTPATIENT)
Dept: GASTROENTEROLOGY | Facility: CLINIC | Age: 67
End: 2022-11-21

## 2022-11-21 ENCOUNTER — PREP FOR PROCEDURE (OUTPATIENT)
Dept: GASTROENTEROLOGY | Facility: CLINIC | Age: 67
End: 2022-11-21

## 2022-11-21 DIAGNOSIS — Z86.010 HISTORY OF COLON POLYPS: Primary | ICD-10-CM

## 2022-11-21 NOTE — TELEPHONE ENCOUNTER
11/21/22  Screened by: Kassandra Cowan    Referring Provider Gasper Stoner    Pre- Screening: There is no height or weight on file to calculate BMI  Has patient been referred for a routine screening Colonoscopy? yes  Is the patient between 39-70 years old? yes      Previous Colonoscopy yes   If yes:    Date: 2016    Facility: [id number]    Reason:       SCHEDULING STAFF: If the patient is between 39yrs-47yrs, please advise patient to confirm benefits/coverage with their insurance company for a routine screening colonoscopy, some insurance carriers will only cover at [address] or older  If the patient is over 66years old, please schedule an office visit  Does the patient want to see a Gastroenterologist prior to their procedure OR are they having any GI symptoms? no    Has the patient been hospitalized or had abdominal surgery in the past 6 months? no    Does the patient use supplemental oxygen? no    Does the patient take Coumadin, Lovenox, Plavix, Elliquis, Xarelto, or other blood thinning medication? no    Has the patient had a stroke, cardiac event, or stent placed in the past year? no    SCHEDULING STAFF: If patient answers NO to above questions, then schedule procedure  If patient answers YES to above questions, then schedule office appointment  PASSED OA    If patient is between 45yrs - 49yrs, please advise patient that we will have to confirm benefits & coverage with their insurance company for a routine screening colonoscopy

## 2022-11-21 NOTE — TELEPHONE ENCOUNTER
[facility] Assessment    Name: Kaylie Paige  YOB: 1955  Last Height: 5' 1" (1 549 m)  Last weight: 180  BMI: 34  Procedure: colonoscopy  Diagnosis: Hx of Polyps  Date of procedure: 1/12/23  Prep: ?  Responsible : Apple/daughter in law  Phone#: [phone]  Name completing form: Yonas Maged  Date form completed: 11/21/22      If the patient answers yes to any of these questions, schedule in a hospital  Are you pregnant: No  Do you rely on a wheelchair for mobility: No  Have you been diagnosed with End Stage Renal Disease (ESRD): No  Do you need oxygen during the day: No  Have you had a heart attack or stroke within the past three months: No  Have you had a seizure within the past three months: No  Have you ever been informed by anesthesia that you have a difficult airway: No  Additional Questions  Have you had any cardiac testing or are under the care of a Cardiologist (see cardiac list): No  Cardiac list:   Do you have an implanted cardiac defibrillator: No (Comment:  This patient should be scheduled in the hospital)    Have any bleeding problems, such as anemia or hemophilia (If patient has H&H result below 8, schedule in hospital   H&H must be within 30 days of procedure): No    Had an organ transplant within the past 3 months: No    Do you have any present infections: No  Do you get short of breath when walking a few blocks: No  Have you been diagnosed with diabetes: Yes  Comments (provide cardiac provider information if applicable): Type 2 / Insulin

## 2022-11-21 NOTE — TELEPHONE ENCOUNTER
Scheduled date of colonoscopy (as of today): 1/16/23    Physician performing colonoscopy: Tena Fernandez    Location of colonoscopy: [facility]

## 2022-12-16 ENCOUNTER — TELEPHONE (OUTPATIENT)
Dept: FAMILY MEDICINE CLINIC | Facility: HOSPITAL | Age: 67
End: 2022-12-16

## 2023-01-17 DIAGNOSIS — E11.9 TYPE 2 DIABETES MELLITUS WITHOUT COMPLICATION, WITHOUT LONG-TERM CURRENT USE OF INSULIN (HCC): ICD-10-CM

## 2023-01-17 RX ORDER — BLOOD SUGAR DIAGNOSTIC
STRIP MISCELLANEOUS
Qty: 150 STRIP | Refills: 3 | Status: SHIPPED | OUTPATIENT
Start: 2023-01-17

## 2023-01-30 ENCOUNTER — TELEPHONE (OUTPATIENT)
Dept: GASTROENTEROLOGY | Facility: CLINIC | Age: 68
End: 2023-01-30

## 2023-01-30 DIAGNOSIS — Z12.11 COLON CANCER SCREENING: Primary | ICD-10-CM

## 2023-01-30 NOTE — TELEPHONE ENCOUNTER
Spoke with patient  Instructions emailed (she can't open MyChart)  [facility]ses [facility]

## 2023-01-30 NOTE — TELEPHONE ENCOUNTER
Patients GI provider:  Dr Aguirre Linear    Number to return call: [phone]    Reason for call: Pt calling stating bowel prep needs to be sent to pharmacy  Bowel prep instructions needed       Scheduled procedure/appointment date if applicable: Procedure 3/47/01

## 2023-02-06 ENCOUNTER — TELEPHONE (OUTPATIENT)
Dept: GASTROENTEROLOGY | Facility: CLINIC | Age: 68
End: 2023-02-06

## 2023-02-06 NOTE — TELEPHONE ENCOUNTER
[facility] Assessment    Name: Mary Leonard  YOB: 1955  Last Height: 5' 1" (1 549 m)  Last weight: 180 lb  BMI: 35 90 kg/m²  Procedure: Colon  Diagnosis: Hx of colon polyps  Date of procedure: 3/15/2023  Prep:   Responsible : Daughter in law Real Bud  Phone#: [phone]  Name completing form: Ileana Roca  Date form completed: 02/06/23      If the patient answers yes to any of these questions, schedule in a hospital  Are you pregnant: No  Do you rely on a wheelchair for mobility: No  Have you been diagnosed with End Stage Renal Disease (ESRD): No  Do you need oxygen during the day: No  Have you had a heart attack or stroke within the past three months: No  Have you had a seizure within the past three months: No  Have you ever been informed by anesthesia that you have a difficult airway: No  Additional Questions  Have you had any cardiac testing or are under the care of a Cardiologist (see cardiac list): No  Cardiac list:   Do you have an implanted cardiac defibrillator: No (Comment:  This patient should be scheduled in the hospital)    Have any bleeding problems, such as anemia or hemophilia (If patient has H&H result below 8, schedule in hospital   H&H must be within 30 days of procedure): No    Had an organ transplant within the past 3 months: No    Do you have any present infections: No  Do you get short of breath when walking a few blocks: No  Have you been diagnosed with diabetes: Yes  Comments (provide cardiac provider information if applicable):

## 2023-02-06 NOTE — TELEPHONE ENCOUNTER
Scheduled date of colonoscopy (as of today): 3/15/2023    Physician performing colonoscopy: Dr Royal Jacobo    Location of colonoscopy: [facility]    Clearances: N/A

## 2023-02-25 ENCOUNTER — OFFICE VISIT (OUTPATIENT)
Dept: URGENT CARE | Facility: CLINIC | Age: 68
End: 2023-02-25

## 2023-02-25 VITALS
HEART RATE: 78 BPM | RESPIRATION RATE: 16 BRPM | OXYGEN SATURATION: 98 % | TEMPERATURE: 98.7 F | SYSTOLIC BLOOD PRESSURE: 151 MMHG | DIASTOLIC BLOOD PRESSURE: 77 MMHG

## 2023-02-25 DIAGNOSIS — J02.9 ACUTE VIRAL PHARYNGITIS: Primary | ICD-10-CM

## 2023-02-25 RX ORDER — FLUTICASONE PROPIONATE 50 MCG
1 SPRAY, SUSPENSION (ML) NASAL DAILY
Qty: 11.1 ML | Refills: 0 | Status: SHIPPED | OUTPATIENT
Start: 2023-02-25

## 2023-02-25 RX ORDER — LIDOCAINE HYDROCHLORIDE 20 MG/ML
15 SOLUTION OROPHARYNGEAL 4 TIMES DAILY PRN
Qty: 100 ML | Refills: 0 | Status: SHIPPED | OUTPATIENT
Start: 2023-02-25 | End: 2023-03-03

## 2023-02-25 NOTE — PROGRESS NOTES
[facility]        NAME: Cooper Landry is a 74 y o  female  : 1955    MRN: [id number]  DATE: 2023  TIME: 6:18 PM    Assessment and Plan   Acute viral pharyngitis [J02 9]  1  Acute viral pharyngitis  Lidocaine Viscous HCl (XYLOCAINE) 2 % mucosal solution    fluticasone (FLONASE) 50 mcg/act nasal spray    Throat culture    Cov/Flu-Collected at [facility]            Patient Instructions     You have been prescribed Flonase and viscous lidocaine for symptom management  Your rapid strep test was negative  No antibiotics are needed at this time  Throat swab will be sent for definitive culture  You also have a Covid/Flu PCR test pending  Results take approximately 48-72 hours  For sore throat you can use Cepacol lozenges, do warm salt water gargles, drink warm water with lemon or herbal teas, or use an over-the-counter throat spray (Chloraseptic)  Follow up with your PCP in 3-5 days if symptoms persist     Go to the ER if symptoms significantly worsen  Chief Complaint     Chief Complaint   Patient presents with   • Sore Throat     Pt has been sick since Wednesday with a sore throat, fever, congestion, and right ear pain  History of Present Illness       This is a 72yo female who presents for evaluation of cold symptoms x4 days  Reports congestion, ear discomfort, sore throat, and subjective fevers  Decreased solid PO intake, has been able to drink fluids  She denies CP/SOB and N/V/D  Review of Systems   Review of Systems   Constitutional: Positive for chills and fever  HENT: Positive for congestion, ear pain and sore throat  Eyes: Negative for discharge and redness  Respiratory: Negative for cough and shortness of breath  Cardiovascular: Negative for chest pain  Gastrointestinal: Negative for diarrhea, nausea and vomiting  Genitourinary: Negative for difficulty urinating  Musculoskeletal: Positive for myalgias     Neurological: Negative for dizziness and headaches           Current Medications       Current Outpatient Medications:   •  amLODIPine (NORVASC) 5 mg tablet, TAKE 1 TABLET DAILY, Disp: 90 tablet, Rfl: 3  •  aspirin 81 MG tablet, Take 1 tablet by mouth daily, Disp: , Rfl:   •  Blood Glucose Monitoring Suppl (OneTouch Verio IQ System) w/Device KIT, Use 4 (four) times a day, Disp: 1 kit, Rfl: 0  •  diclofenac potassium (CATAFLAM) 50 mg tablet, Take 1 tablet (50 mg total) by mouth 3 (three) times a day, Disp: 270 tablet, Rfl: 3  •  fluticasone (FLONASE) 50 mcg/act nasal spray, 1 spray into each nostril daily, Disp: 11 1 mL, Rfl: 0  •  glucose blood (OneTouch Verio) test strip, TEST BLOOD SUGAR 4 TIMES A DAY, Disp: 150 strip, Rfl: 3  •  glucose monitoring kit (FREESTYLE) monitoring kit, Testing 4 times daily - DX- E11 9, Disp: 1 each, Rfl: 0  •  insulin lispro (HumaLOG KwikPen) 100 units/mL injection pen, Inject 20 units under the skin -three times a day with meals  - sliding scale, Disp: 15 mL, Rfl: 3  •  Insulin Syringe-Needle U-100 31G X 5/16" 0 3 ML MISC, by Does not apply route, Disp: , Rfl:   •  Lantus SoloStar 100 units/mL injection pen, 30 units hs, Disp: 15 mL, Rfl: 11  •  levothyroxine 100 mcg tablet, TAKE 1 TABLET DAILY, Disp: 90 tablet, Rfl: 3  •  Lidocaine Viscous HCl (XYLOCAINE) 2 % mucosal solution, Swish and spit 15 mL 4 (four) times a day as needed for mouth pain or discomfort, Disp: 100 mL, Rfl: 0  •  meclizine (ANTIVERT) 12 5 MG tablet, Take 1 tablet (12 5 mg total) by mouth every 8 (eight) hours as needed for dizziness Take TID for three days then prn, Disp: 20 tablet, Rfl: 1  •  metoprolol tartrate (LOPRESSOR) 50 mg tablet, Take 1 tablet (50 mg total) by mouth 2 (two) times a day, Disp: 180 tablet, Rfl: 3  •  omeprazole (PriLOSEC) 20 mg delayed release capsule, TAKE 1 CAPSULE DAILY, Disp: 90 capsule, Rfl: 1  •  OneTouch Delica Lancets 08N MISC, Testing 4 times daily, Disp: 200 each, Rfl: 5  •  polyethylene glycol (GOLYTELY) 4000 mL solution, As directed, Disp: 4000 mL, Rfl: 0  •  simvastatin (ZOCOR) 20 mg tablet, TAKE 1 TABLET DAILY, Disp: 90 tablet, Rfl: 3  •  traMADol (ULTRAM) 50 mg tablet, Take 1 tablet (50 mg total) by mouth 2 (two) times a day as needed for moderate pain, Disp: 60 tablet, Rfl: 3  •  valsartan-hydrochlorothiazide (DIOVAN-HCT) 160-25 MG per tablet, TAKE 1 TABLET DAILY, Disp: 90 tablet, Rfl: 3    Current Allergies     Allergies as of 02/25/2023 - Reviewed 02/25/2023   Allergen Reaction Noted   • Niacin Anaphylaxis 10/15/2014   • Ciprofloxacin  10/15/2014   • Pioglitazone Hives 10/15/2014   • Cefprozil Rash 10/15/2014   • Cefuroxime Rash 10/15/2014   • Ramipril Rash 10/15/2014            The following portions of the patient's history were reviewed and updated as appropriate: allergies, current medications, past family history, past medical history, past social history, past surgical history and problem list      Past Medical History:   Diagnosis Date   • History of total left knee replacement (TKR) 2013   • Obesity    • TMJ (dislocation of temporomandibular joint)    • Trigeminal neuralgia        Past Surgical History:   Procedure Laterality Date   • BREAST BIOPSY Right 2018   • CARDIAC CATHETERIZATION     • HYSTERECTOMY     • OOPHORECTOMY     • TONSILLECTOMY     • US GUIDED BREAST BIOPSY RIGHT COMPLETE Right 9/5/2018       Family History   Problem Relation Age of Onset   • Colon cancer Mother         63's   • Dementia Father    • Depression Father    • Diabetes Father    • Stroke Father    • Parkinsonism Father    • GI problems Family         GI malignancy   • No Known Problems Sister    • No Known Problems Maternal Grandmother    • No Known Problems Maternal Grandfather    • No Known Problems Paternal Grandmother    • No Known Problems Paternal Grandfather    • No Known Problems Sister    • No Known Problems Sister    • No Known Problems Maternal Aunt    • No Known Problems Maternal Aunt    • No Known Problems Maternal Aunt    • No Known Problems Maternal Aunt    • No Known Problems Maternal Aunt    • Breast cancer Paternal Aunt         46's   • No Known Problems Paternal Aunt    • No Known Problems Paternal Aunt    • No Known Problems Paternal Aunt    • Endometrial cancer Neg Hx    • Ovarian cancer Neg Hx          Medications have been verified  Objective   /77   Pulse 78   Temp 98 7 °F (37 1 °C)   Resp 16   LMP  (LMP Unknown)   SpO2 98%        Physical Exam     Physical Exam  Vitals and nursing note reviewed  Constitutional:       Appearance: She is ill-appearing  She is not toxic-appearing or diaphoretic  HENT:      Head: Normocephalic  Right Ear: Ear canal and external ear normal  A middle ear effusion is present  Left Ear: Ear canal and external ear normal  A middle ear effusion is present  Nose: Nose normal       Mouth/Throat:      Mouth: Mucous membranes are moist       Pharynx: Posterior oropharyngeal erythema present  No oropharyngeal exudate  Eyes:      Conjunctiva/sclera: Conjunctivae normal       Pupils: Pupils are equal, round, and reactive to light  Cardiovascular:      Rate and Rhythm: Normal rate and regular rhythm  Pulses: Normal pulses  Heart sounds: Normal heart sounds  Pulmonary:      Effort: Pulmonary effort is normal       Breath sounds: Normal breath sounds  Musculoskeletal:         General: Normal range of motion  Lymphadenopathy:      Cervical: Cervical adenopathy present  Skin:     General: Skin is warm and dry  Capillary Refill: Capillary refill takes less than 2 seconds  Neurological:      Mental Status: She is alert and oriented to person, place, and time     Psychiatric:         Mood and Affect: Mood normal

## 2023-02-25 NOTE — PATIENT INSTRUCTIONS
You have been prescribed Flonase and viscous lidocaine for symptom management  Your rapid strep test was negative  No antibiotics are needed at this time  Throat swab will be sent for definitive culture  You also have a Covid/Flu PCR test pending  Results take approximately 48-72 hours  For sore throat you can use Cepacol lozenges, do warm salt water gargles, drink warm water with lemon or herbal teas, or use an over-the-counter throat spray (Chloraseptic)  Follow up with your PCP in 3-5 days if symptoms persist     Go to the ER if symptoms significantly worsen

## 2023-02-27 ENCOUNTER — TELEPHONE (OUTPATIENT)
Dept: URGENT CARE | Facility: CLINIC | Age: 68
End: 2023-02-27

## 2023-02-27 ENCOUNTER — TELEPHONE (OUTPATIENT)
Dept: FAMILY MEDICINE CLINIC | Facility: HOSPITAL | Age: 68
End: 2023-02-27

## 2023-02-27 DIAGNOSIS — J02.0 STREP PHARYNGITIS: Primary | ICD-10-CM

## 2023-02-27 LAB
BACTERIA THROAT CULT: ABNORMAL
FLUAV RNA RESP QL NAA+PROBE: NEGATIVE
FLUBV RNA RESP QL NAA+PROBE: NEGATIVE
SARS-COV-2 RNA RESP QL NAA+PROBE: NEGATIVE

## 2023-02-27 RX ORDER — AZITHROMYCIN 500 MG/1
500 TABLET, FILM COATED ORAL DAILY
Qty: 5 TABLET | Refills: 0 | Status: SHIPPED | OUTPATIENT
Start: 2023-02-27 | End: 2023-03-03

## 2023-02-27 NOTE — TELEPHONE ENCOUNTER
Received throat culture result 3+ growth of beta hemolytic strep group A  Patient with allergy to cephalosporins  Prescription for azithromycin sent to pharmacy  Will notify patient of same

## 2023-02-27 NOTE — TELEPHONE ENCOUNTER
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

## 2023-03-01 ENCOUNTER — TELEPHONE (OUTPATIENT)
Dept: GASTROENTEROLOGY | Facility: CLINIC | Age: 68
End: 2023-03-01

## 2023-03-03 ENCOUNTER — APPOINTMENT (OUTPATIENT)
Dept: LAB | Facility: HOSPITAL | Age: 68
End: 2023-03-03

## 2023-03-03 ENCOUNTER — OFFICE VISIT (OUTPATIENT)
Dept: FAMILY MEDICINE CLINIC | Facility: HOSPITAL | Age: 68
End: 2023-03-03

## 2023-03-03 VITALS
OXYGEN SATURATION: 97 % | SYSTOLIC BLOOD PRESSURE: 180 MMHG | DIASTOLIC BLOOD PRESSURE: 62 MMHG | BODY MASS INDEX: 33.61 KG/M2 | HEART RATE: 75 BPM | HEIGHT: 61 IN | WEIGHT: 178 LBS

## 2023-03-03 DIAGNOSIS — E11.9 TYPE 2 DIABETES MELLITUS WITHOUT COMPLICATION, WITHOUT LONG-TERM CURRENT USE OF INSULIN (HCC): ICD-10-CM

## 2023-03-03 DIAGNOSIS — I10 BENIGN ESSENTIAL HYPERTENSION: ICD-10-CM

## 2023-03-03 DIAGNOSIS — E78.00 PURE HYPERCHOLESTEROLEMIA: ICD-10-CM

## 2023-03-03 DIAGNOSIS — M17.0 PRIMARY OSTEOARTHRITIS OF BOTH KNEES: ICD-10-CM

## 2023-03-03 DIAGNOSIS — Z12.31 ENCOUNTER FOR SCREENING MAMMOGRAM FOR BREAST CANCER: ICD-10-CM

## 2023-03-03 DIAGNOSIS — Z78.0 POSTMENOPAUSAL STATE: ICD-10-CM

## 2023-03-03 DIAGNOSIS — Z13.29 SCREENING FOR THYROID DISORDER: Primary | ICD-10-CM

## 2023-03-03 DIAGNOSIS — E11.3293 MILD NONPROLIFERATIVE DIABETIC RETINOPATHY OF BOTH EYES WITHOUT MACULAR EDEMA ASSOCIATED WITH TYPE 2 DIABETES MELLITUS (HCC): ICD-10-CM

## 2023-03-03 DIAGNOSIS — I73.9 PAD (PERIPHERAL ARTERY DISEASE) (HCC): ICD-10-CM

## 2023-03-03 DIAGNOSIS — M35.00 H/O SJOGREN'S DISEASE (HCC): ICD-10-CM

## 2023-03-03 DIAGNOSIS — E03.9 ACQUIRED HYPOTHYROIDISM: ICD-10-CM

## 2023-03-03 DIAGNOSIS — F33.9 DEPRESSION, RECURRENT (HCC): ICD-10-CM

## 2023-03-03 LAB
ALBUMIN SERPL BCP-MCNC: 3.8 G/DL (ref 3.5–5)
ALP SERPL-CCNC: 105 U/L (ref 46–116)
ALT SERPL W P-5'-P-CCNC: 15 U/L (ref 12–78)
ANION GAP SERPL CALCULATED.3IONS-SCNC: 4 MMOL/L (ref 4–13)
AST SERPL W P-5'-P-CCNC: 14 U/L (ref 5–45)
BILIRUB SERPL-MCNC: 0.54 MG/DL (ref 0.2–1)
BUN SERPL-MCNC: 12 MG/DL (ref 5–25)
CALCIUM SERPL-MCNC: 9.8 MG/DL (ref 8.3–10.1)
CHLORIDE SERPL-SCNC: 103 MMOL/L (ref 96–108)
CHOLEST SERPL-MCNC: 123 MG/DL
CO2 SERPL-SCNC: 32 MMOL/L (ref 21–32)
CREAT SERPL-MCNC: 0.8 MG/DL (ref 0.6–1.3)
CREAT UR-MCNC: 162 MG/DL
ERYTHROCYTE [DISTWIDTH] IN BLOOD BY AUTOMATED COUNT: 12.7 % (ref 11.6–15.1)
GFR SERPL CREATININE-BSD FRML MDRD: 77 ML/MIN/1.73SQ M
GLUCOSE P FAST SERPL-MCNC: 112 MG/DL (ref 65–99)
HCT VFR BLD AUTO: 41.3 % (ref 34.8–46.1)
HDLC SERPL-MCNC: 40 MG/DL
HGB BLD-MCNC: 12.9 G/DL (ref 11.5–15.4)
LDLC SERPL CALC-MCNC: 54 MG/DL (ref 0–100)
MCH RBC QN AUTO: 27.6 PG (ref 26.8–34.3)
MCHC RBC AUTO-ENTMCNC: 31.2 G/DL (ref 31.4–37.4)
MCV RBC AUTO: 88 FL (ref 82–98)
MICROALBUMIN UR-MCNC: 25.6 MG/L (ref 0–20)
MICROALBUMIN/CREAT 24H UR: 16 MG/G CREATININE (ref 0–30)
PLATELET # BLD AUTO: 388 THOUSANDS/UL (ref 149–390)
PMV BLD AUTO: 10.9 FL (ref 8.9–12.7)
POTASSIUM SERPL-SCNC: 3.1 MMOL/L (ref 3.5–5.3)
PROT SERPL-MCNC: 8 G/DL (ref 6.4–8.4)
RBC # BLD AUTO: 4.67 MILLION/UL (ref 3.81–5.12)
SODIUM SERPL-SCNC: 139 MMOL/L (ref 135–147)
TRIGL SERPL-MCNC: 143 MG/DL
TSH SERPL DL<=0.05 MIU/L-ACNC: 0.1 UIU/ML (ref 0.45–4.5)
WBC # BLD AUTO: 16.14 THOUSAND/UL (ref 4.31–10.16)

## 2023-03-03 RX ORDER — TRAMADOL HYDROCHLORIDE 50 MG/1
50 TABLET ORAL 2 TIMES DAILY PRN
Qty: 60 TABLET | Refills: 3 | Status: SHIPPED | OUTPATIENT
Start: 2023-03-03

## 2023-03-03 RX ORDER — METOPROLOL TARTRATE 50 MG/1
TABLET, FILM COATED ORAL
Qty: 180 TABLET | Refills: 3 | Status: SHIPPED | OUTPATIENT
Start: 2023-03-03

## 2023-03-03 RX ORDER — INSULIN GLARGINE 100 [IU]/ML
INJECTION, SOLUTION SUBCUTANEOUS
Qty: 15 ML | Refills: 6 | Status: SHIPPED | OUTPATIENT
Start: 2023-03-03

## 2023-03-03 NOTE — PROGRESS NOTES
[facility],     Assessment/Plan:      Diagnosis ICD-10-CM Associated Orders   1  Screening for thyroid disorder  Z13 29       2  Primary osteoarthritis of both knees  M17 0 traMADol (ULTRAM) 50 mg tablet      3  Encounter for screening mammogram for breast cancer  Z12 31 Mammo screening bilateral w 3d & cad      4  Postmenopausal state  Z78 0 DXA bone density spine hip and pelvis      5  Mild nonproliferative diabetic retinopathy of both eyes without macular edema associated with type 2 diabetes mellitus ([facility] [address] )  O03 0740       6  H/O Sjogren's disease ([facility] [address] )  M35 00       7  Depression, recurrent ([place] [address] )  F33 9       8  PAD (peripheral artery disease) ([facility])  I73 9       9  Type 2 diabetes mellitus without complication, without long-term current use of insulin ([facility])  E11 9         • Counseled on DXA & Mammo importance  Order placed  • Tramadol refilled  • Keep an eye on BP at home at times  • Fall handout given  F/U in 6 months or sooner as needed  • Patient may call or return to office with any questions or concerns  ______________________________________________________________________  Subjective:     Patient ID: Rylan Jamison is a 79 y o  female  HPI  Rylan Jamison  Chief Complaint   Patient presents with   • Follow-up     Sick last 2 weeks with strep  Did already have tonsils out  Starting to feel much better  Last day of Radha Quintanilla today  Ran out of the door this am with grandson, didn't yet take beds  Insulin 30 U qhs & meal time 20 U ISS  BS - achs - 100's mostly  Typically compliant with BP meds, and limits salt  Getting labs today  BMI Counseling: Body mass index is 33 63 kg/m²  The BMI is above normal  Nutrition recommendations include decreasing portion sizes and encouraging healthy choices of fruits and vegetables  Exercise recommendations include moderate physical activity 150 minutes/week and strength training exercises   Rationale for BMI follow-up plan is due to patient being overweight or obese  Falls Plan of Care: balance, strength, and gait training instructions were provided  Urinary Incontinence Plan of Care: counseling topics discussed: use restroom every 2 hours, limiting fluid intake 3-4 hours before bed and preventing constipation  The following portions of the patient's history were reviewed and updated as appropriate: allergies, current medications, past medical history, and problem list     Review of Systems   Constitutional: Negative for chills and fever  HENT: Positive for congestion, ear pain (resolving), postnasal drip, rhinorrhea, sinus pain and sore throat (improving)  Negative for trouble swallowing and voice change  Respiratory: Positive for cough (better)  Negative for chest tightness and shortness of breath  Cardiovascular: Positive for palpitations (occas)  Negative for chest pain  Can get light chest pressure with KATHY   Gastrointestinal: Negative for diarrhea, nausea and vomiting  Genitourinary: Negative for dysuria and frequency  Neurological: Negative for dizziness, light-headedness and headaches (resolved with URI)  Objective:      Vitals:    03/03/23 0915   BP: (!) 180/62   Pulse: 75   SpO2: 97%   Nervous at doctor's     Physical Exam  Vitals reviewed  Constitutional:       General: She is not in acute distress  Appearance: Normal appearance  She is well-developed  She is not ill-appearing  HENT:      Head: Normocephalic and atraumatic  Eyes:      General: No scleral icterus  Right eye: No discharge  Left eye: No discharge  Cardiovascular:      Rate and Rhythm: Normal rate and regular rhythm  Pulses: Normal pulses  Heart sounds: Normal heart sounds  No murmur heard  Pulmonary:      Effort: Pulmonary effort is normal  No respiratory distress  Breath sounds: Normal breath sounds  No stridor  No wheezing     Musculoskeletal:      Cervical "back: Normal range of motion  No rigidity  Right lower leg: No edema  Left lower leg: No edema  Skin:     General: Skin is warm  Neurological:      Mental Status: She is alert and oriented to person, place, and time  Gait: Gait normal    Psychiatric:         Mood and Affect: Mood normal          Behavior: Behavior normal          Thought Content: Thought content normal          Judgment: Judgment normal            Portions of the record may have been created with voice recognition software  Occasional wrong word or \"sound alike\" substitutions may have occurred due to the inherent limitations of voice recognition software  Please review the chart carefully and recognize, using context, where substitutions/typographical errors may have occurred       "

## 2023-03-04 LAB
EST. AVERAGE GLUCOSE BLD GHB EST-MCNC: 134 MG/DL
HBA1C MFR BLD: 6.3 %

## 2023-03-08 ENCOUNTER — TELEPHONE (OUTPATIENT)
Dept: FAMILY MEDICINE CLINIC | Facility: HOSPITAL | Age: 68
End: 2023-03-08

## 2023-03-08 DIAGNOSIS — E87.6 HYPOKALEMIA: Primary | ICD-10-CM

## 2023-03-15 ENCOUNTER — ANESTHESIA (OUTPATIENT)
Dept: GASTROENTEROLOGY | Facility: AMBULATORY SURGERY CENTER | Age: 68
End: 2023-03-15

## 2023-03-15 ENCOUNTER — ANESTHESIA EVENT (OUTPATIENT)
Dept: GASTROENTEROLOGY | Facility: AMBULATORY SURGERY CENTER | Age: 68
End: 2023-03-15

## 2023-03-15 ENCOUNTER — HOSPITAL ENCOUNTER (OUTPATIENT)
Dept: GASTROENTEROLOGY | Facility: AMBULATORY SURGERY CENTER | Age: 68
Discharge: HOME | End: 2023-03-15

## 2023-03-15 VITALS
SYSTOLIC BLOOD PRESSURE: 128 MMHG | WEIGHT: 175 LBS | TEMPERATURE: 98.2 F | RESPIRATION RATE: 19 BRPM | OXYGEN SATURATION: 97 % | BODY MASS INDEX: 33.04 KG/M2 | HEIGHT: 61 IN | DIASTOLIC BLOOD PRESSURE: 58 MMHG | HEART RATE: 70 BPM

## 2023-03-15 DIAGNOSIS — Z86.010 HISTORY OF COLON POLYPS: ICD-10-CM

## 2023-03-15 RX ORDER — LIDOCAINE HYDROCHLORIDE 10 MG/ML
INJECTION, SOLUTION EPIDURAL; INFILTRATION; INTRACAUDAL; PERINEURAL AS NEEDED
Status: DISCONTINUED | OUTPATIENT
Start: 2023-03-15 | End: 2023-03-15

## 2023-03-15 RX ORDER — SODIUM CHLORIDE, SODIUM LACTATE, POTASSIUM CHLORIDE, CALCIUM CHLORIDE 600; 310; 30; 20 MG/100ML; MG/100ML; MG/100ML; MG/100ML
50 INJECTION, SOLUTION INTRAVENOUS CONTINUOUS
Status: DISCONTINUED | OUTPATIENT
Start: 2023-03-15 | End: 2023-03-19 | Stop reason: HOSPADM

## 2023-03-15 RX ORDER — PROPOFOL 10 MG/ML
INJECTION, EMULSION INTRAVENOUS AS NEEDED
Status: DISCONTINUED | OUTPATIENT
Start: 2023-03-15 | End: 2023-03-15

## 2023-03-15 RX ADMIN — PROPOFOL 40 MG: 10 INJECTION, EMULSION INTRAVENOUS at 08:09

## 2023-03-15 RX ADMIN — PROPOFOL 40 MG: 10 INJECTION, EMULSION INTRAVENOUS at 08:12

## 2023-03-15 RX ADMIN — PROPOFOL 20 MG: 10 INJECTION, EMULSION INTRAVENOUS at 08:20

## 2023-03-15 RX ADMIN — SODIUM CHLORIDE, SODIUM LACTATE, POTASSIUM CHLORIDE, CALCIUM CHLORIDE 50 ML/HR: 600; 310; 30; 20 INJECTION, SOLUTION INTRAVENOUS at 07:57

## 2023-03-15 RX ADMIN — PROPOFOL 40 MG: 10 INJECTION, EMULSION INTRAVENOUS at 08:08

## 2023-03-15 RX ADMIN — PROPOFOL 40 MG: 10 INJECTION, EMULSION INTRAVENOUS at 08:15

## 2023-03-15 RX ADMIN — PROPOFOL 20 MG: 10 INJECTION, EMULSION INTRAVENOUS at 08:19

## 2023-03-15 RX ADMIN — LIDOCAINE HYDROCHLORIDE 50 MG: 10 INJECTION, SOLUTION EPIDURAL; INFILTRATION; INTRACAUDAL; PERINEURAL at 08:02

## 2023-03-15 RX ADMIN — PROPOFOL 40 MG: 10 INJECTION, EMULSION INTRAVENOUS at 08:07

## 2023-03-15 RX ADMIN — PROPOFOL 40 MG: 10 INJECTION, EMULSION INTRAVENOUS at 08:10

## 2023-03-15 RX ADMIN — PROPOFOL 120 MG: 10 INJECTION, EMULSION INTRAVENOUS at 08:04

## 2023-03-15 NOTE — H&P
History and Physical - [facility]  Kamron Tam 74 y o  female MRN: [id number]    HPI: Kamron Tam is a 74y o  year old female who presents for personal history of colon polyps    REVIEW OF SYSTEMS: Per the HPI, and otherwise unremarkable      Historical Information   Past Medical History:   Diagnosis Date   • Diabetes mellitus ([facility] )    • Disease of thyroid gland    • GERD (gastroesophageal reflux disease)    • History of total left knee replacement (TKR) 2013   • Hyperlipidemia    • Hypertension    • Lumbar stenosis    • Obesity    • Sjogren's syndrome ([facility] )    • TMJ (dislocation of temporomandibular joint)    • Trigeminal neuralgia      Past Surgical History:   Procedure Laterality Date   • BREAST BIOPSY Right 2018   • CARDIAC CATHETERIZATION     • COLONOSCOPY     • HYSTERECTOMY     • OOPHORECTOMY     • TONSILLECTOMY     • US GUIDED BREAST BIOPSY RIGHT COMPLETE Right 09/05/2018     Social History   Social History     Substance and Sexual Activity   Alcohol Use No     Social History     Substance and Sexual Activity   Drug Use No     Social History     Tobacco Use   Smoking Status Never   Smokeless Tobacco Never     Family History   Problem Relation Age of Onset   • Colon cancer Mother         63's   • Dementia Father    • Depression Father    • Diabetes Father    • Stroke Father    • Parkinsonism Father    • GI problems Family         GI malignancy   • No Known Problems Sister    • No Known Problems Maternal Grandmother    • No Known Problems Maternal Grandfather    • No Known Problems Paternal Grandmother    • No Known Problems Paternal Grandfather    • No Known Problems Sister    • No Known Problems Sister    • No Known Problems Maternal Aunt    • No Known Problems Maternal Aunt    • No Known Problems Maternal Aunt    • No Known Problems Maternal Aunt    • No Known Problems Maternal Aunt    • Breast cancer Paternal Aunt         52's   • No Known Problems Paternal Aunt    • No Known Problems Paternal Aunt    • No Known Problems Paternal Aunt    • Endometrial cancer Neg Hx    • Ovarian cancer Neg Hx        Meds/Allergies       Current Outpatient Medications:   •  amLODIPine (NORVASC) 5 mg tablet  •  aspirin 81 MG tablet  •  diclofenac potassium (CATAFLAM) 50 mg tablet  •  fluticasone (FLONASE) 50 mcg/act nasal spray  •  insulin lispro (HumaLOG KwikPen) 100 units/mL injection pen  •  Lantus SoloStar 100 units/mL SOPN  •  levothyroxine 100 mcg tablet  •  metoprolol tartrate (LOPRESSOR) 50 mg tablet  •  omeprazole (PriLOSEC) 20 mg delayed release capsule  •  simvastatin (ZOCOR) 20 mg tablet  •  traMADol (ULTRAM) 50 mg tablet  •  valsartan-hydrochlorothiazide (DIOVAN-HCT) 160-25 MG per tablet  •  Blood Glucose Monitoring Suppl (OneTouch Verio IQ System) w/Device KIT  •  glucose blood (OneTouch Verio) test strip  •  glucose monitoring kit (FREESTYLE) monitoring kit  •  Insulin Syringe-Needle U-100 31G X 5/16" 0 3 ML MISC  •  OneTouch Delica Lancets [address] [facility]    Current Facility-Administered Medications:   •  lactated ringers infusion, 50 mL/hr, Intravenous, Continuous, 50 mL/hr at 03/15/23 0757    Allergies   Allergen Reactions   • Niacin Anaphylaxis     Annotation - 16Oct2014: Flushing   • Ciprofloxacin    • Pioglitazone Hives   • Cefprozil Rash   • Cefuroxime Rash   • Ramipril Rash       Objective     /67   Pulse 77   Temp 98 2 °F (36 8 °C) (Temporal)   Resp (!) 23   Ht 5' 1" (1 549 m)   Wt 79 4 kg (175 lb)   LMP  (LMP Unknown)   SpO2 100%   BMI 33 07 kg/m²     PHYSICAL EXAM    Gen: NAD AAOx3  Head: Normocephalic, Atraumatic  CV: S1S2 RRR no m/r/g  CHEST: Clear b/l no c/r/w  ABD: soft, +BS NT/ND  EXT: no edema    ASSESSMENT/PLAN:  This is a 74y o  year old female here for colonoscopy, and she is stable and optimized for her procedure

## 2023-03-15 NOTE — ANESTHESIA POSTPROCEDURE EVALUATION
Post-Op Assessment Note    CV Status:  Stable  Pain Score: 0    Pain management: adequate     Mental Status:  Awake and sleepy   Hydration Status:  Euvolemic   PONV Controlled:  Controlled   Airway Patency:  Patent      Post Op Vitals Reviewed: Yes      Staff: Anesthesiologist, CRNA         No notable events documented      BP      Temp      Pulse     Resp     SpO2

## 2023-03-15 NOTE — ANESTHESIA PREPROCEDURE EVALUATION
Procedure:  COLONOSCOPY    Relevant Problems   CARDIO   (+) Benign essential hypertension   (+) PAD (peripheral artery disease) (HCC)   (+) Pure hypercholesterolemia      ENDO   (+) Acquired hypothyroidism   (+) Type 2 diabetes mellitus without complication, without long-term current use of insulin (HCC)      GI/HEPATIC   (+) Gastroesophageal reflux disease without esophagitis      NEURO/PSYCH   (+) Anxiety   (+) Depression, recurrent (HCC)      Other   (+) Sjogrens syndrome (HCC)        Physical Exam    Airway    Mallampati score: II  TM Distance: >3 FB  Neck ROM: full     Dental   Comment: None loose, No notable dental hx     Cardiovascular      Pulmonary      Other Findings        Anesthesia Plan  ASA Score- 2     Anesthesia Type- IV sedation with anesthesia with ASA Monitors  Additional Monitors:   Airway Plan:     Comment: Last of PO bowel prep: 02:00    Patient educated on the possibility for awareness under sedation and of the possibility of airway intervention in the event of an airway or procedural emergency    Plan Factors-Exercise comment: Patient does not exert herself  Chart reviewed  Patient summary reviewed  Patient is not a current smoker  Induction- intravenous  Postoperative Plan-     Informed Consent- Anesthetic plan and risks discussed with patient  I personally reviewed this patient with the CRNA  Discussed and agreed on the Anesthesia Plan with the CRNA  Cody Milligan

## 2023-03-15 NOTE — PERIOPERATIVE NURSING NOTE
Patient states she did a blood sugar this am at 0645 and the result was 138  Dr Grey First notified

## 2023-03-30 ENCOUNTER — TELEPHONE (OUTPATIENT)
Dept: FAMILY MEDICINE CLINIC | Facility: HOSPITAL | Age: 68
End: 2023-03-30

## 2023-03-30 NOTE — TELEPHONE ENCOUNTER
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

## 2023-03-31 DIAGNOSIS — R52 PAIN: Primary | ICD-10-CM

## 2023-04-03 ENCOUNTER — APPOINTMENT (OUTPATIENT)
Dept: LAB | Facility: HOSPITAL | Age: 68
End: 2023-04-03
Attending: STUDENT IN AN ORGANIZED HEALTH CARE EDUCATION/TRAINING PROGRAM

## 2023-04-03 ENCOUNTER — HOSPITAL ENCOUNTER (OUTPATIENT)
Dept: RADIOLOGY | Facility: HOSPITAL | Age: 68
Discharge: HOME | End: 2023-04-03
Attending: STUDENT IN AN ORGANIZED HEALTH CARE EDUCATION/TRAINING PROGRAM

## 2023-04-03 DIAGNOSIS — E87.6 HYPOKALEMIA: ICD-10-CM

## 2023-04-03 DIAGNOSIS — R52 PAIN: ICD-10-CM

## 2023-04-03 LAB
ANION GAP SERPL CALCULATED.3IONS-SCNC: 4 MMOL/L (ref 4–13)
BUN SERPL-MCNC: 13 MG/DL (ref 5–25)
CALCIUM SERPL-MCNC: 9.4 MG/DL (ref 8.3–10.1)
CHLORIDE SERPL-SCNC: 101 MMOL/L (ref 96–108)
CO2 SERPL-SCNC: 31 MMOL/L (ref 21–32)
CREAT SERPL-MCNC: 0.85 MG/DL (ref 0.6–1.3)
GFR SERPL CREATININE-BSD FRML MDRD: 71 ML/MIN/1.73SQ M
GLUCOSE P FAST SERPL-MCNC: 141 MG/DL (ref 65–99)
POTASSIUM SERPL-SCNC: 3.9 MMOL/L (ref 3.5–5.3)
SODIUM SERPL-SCNC: 136 MMOL/L (ref 135–147)

## 2023-04-04 ENCOUNTER — OFFICE VISIT (OUTPATIENT)
Dept: FAMILY MEDICINE CLINIC | Facility: HOSPITAL | Age: 68
End: 2023-04-04

## 2023-04-04 ENCOUNTER — TELEPHONE (OUTPATIENT)
Dept: FAMILY MEDICINE CLINIC | Facility: HOSPITAL | Age: 68
End: 2023-04-04

## 2023-04-04 VITALS
WEIGHT: 179 LBS | HEART RATE: 79 BPM | OXYGEN SATURATION: 99 % | DIASTOLIC BLOOD PRESSURE: 82 MMHG | HEIGHT: 61 IN | SYSTOLIC BLOOD PRESSURE: 138 MMHG | BODY MASS INDEX: 33.79 KG/M2

## 2023-04-04 DIAGNOSIS — M25.532 LEFT WRIST PAIN: Primary | ICD-10-CM

## 2023-04-04 DIAGNOSIS — M65.4 DE QUERVAIN'S TENOSYNOVITIS, LEFT: ICD-10-CM

## 2023-04-04 DIAGNOSIS — D72.829 LEUKOCYTOSIS, UNSPECIFIED TYPE: ICD-10-CM

## 2023-04-04 DIAGNOSIS — E03.9 ACQUIRED HYPOTHYROIDISM: ICD-10-CM

## 2023-04-04 DIAGNOSIS — F41.9 ANXIETY: ICD-10-CM

## 2023-04-04 DIAGNOSIS — I10 BENIGN ESSENTIAL HYPERTENSION: ICD-10-CM

## 2023-04-04 DIAGNOSIS — Z13.0 SCREENING FOR IRON DEFICIENCY ANEMIA: ICD-10-CM

## 2023-04-04 DIAGNOSIS — E11.9 TYPE 2 DIABETES MELLITUS WITHOUT COMPLICATION, WITHOUT LONG-TERM CURRENT USE OF INSULIN (HCC): ICD-10-CM

## 2023-04-04 NOTE — TELEPHONE ENCOUNTER
EFRAIN WAS LOOKING OVER HER COLONOSCOPY REPORT - ASKING IF A NURSE COULD GIVE HER A CALL AND GO OVER IT WITH HER

## 2023-04-04 NOTE — PROGRESS NOTES
[facility],     Assessment/Plan:      Diagnosis ICD-10-CM Associated Orders   1  Left wrist pain  M25 532       2  De Quervain's tenosynovitis, left  M65 4       3  Anxiety  F41 9       4  Type 2 diabetes mellitus without complication, without long-term current use of insulin (HCC)  E11 9 Comprehensive metabolic panel     HEMOGLOBIN A1C W/ EAG ESTIMATION      5  Benign essential hypertension  I10 Comprehensive metabolic panel      6  Acquired hypothyroidism  E03 9 TSH, 3rd generation      7  Screening for iron deficiency anemia  Z13 0 CBC and differential      8  Leukocytosis, unspecified type  D72 829 CBC and differential        • Increase cataflam to three times per day x1 week then back down to once or twice per day as needed  • XR result pending radiology  o XR L wrist 3vw- 4/3/23 -no occult fracture, but arthritic changes prevalent at 1st [facility] joint and radial styloid   • Ice to the area  Can consider PT/OT or hand exercises if interested  • Consider thumb spica if XR negative as well  • Will order labs for Sept visit  Labs reviewed in office  Return for Sept check up as scheduled  • Patient may call or return to office with any questions or concerns  ______________________________________________________________________  Subjective:     Patient ID: Darren Barry is a 79 y o  female  HPI  Darren Barry  Chief Complaint   Patient presents with   • Wrist Pain     Left wrist pain 2-3 weeks  XR yesterday  RHD  No prior surgery on hand  No prior trauma  When younger grandson grabber her arm, it throbbed  Does get shooting pain  Rare tramadol use for pain, needed it today  Cataflam typically daily  BMP yesterday K+ resolved, unknown etiology  No HCTZ dose change  Glucose elevated, non fasting because we already had UTD A1c    TSH low, btu no interest in dose change to 88 mcg at this time       Depression Screening and Follow-up Plan: Patient "advised to follow-up with PCP for further management  The following portions of the patient's history were reviewed and updated as appropriate: allergies, current medications, past medical history, and problem list     Review of Systems   Musculoskeletal: Positive for arthralgias and joint swelling  Psychiatric/Behavioral: Positive for dysphoric mood  The patient is not nervous/anxious  Objective:      Vitals:    04/04/23 1123   BP: 138/82   Pulse: 79   SpO2: 99%      Physical Exam  Vitals reviewed  Constitutional:       Appearance: Normal appearance  She is well-developed  She is obese  HENT:      Head: Normocephalic and atraumatic  Eyes:      General: No scleral icterus  Right eye: No discharge  Left eye: No discharge  Cardiovascular:      Rate and Rhythm: Normal rate  Pulmonary:      Effort: Pulmonary effort is normal  No respiratory distress  Breath sounds: No stridor  Musculoskeletal:         General: Swelling (Left radial wrist) and tenderness (Left first dorsal compartment) present  Normal range of motion  Cervical back: Normal range of motion  Comments: Positive Finkelstein test on left, negative on right     Skin:     General: Skin is warm  Neurological:      Mental Status: She is alert and oriented to person, place, and time  Psychiatric:         Mood and Affect: Mood normal          Behavior: Behavior normal          Thought Content: Thought content normal          Judgment: Judgment normal            Portions of the record may have been created with voice recognition software  Occasional wrong word or \"sound alike\" substitutions may have occurred due to the inherent limitations of voice recognition software  Please review the chart carefully and recognize, using context, where substitutions/typographical errors may have occurred       "

## 2023-04-07 ENCOUNTER — TELEPHONE (OUTPATIENT)
Dept: FAMILY MEDICINE CLINIC | Facility: HOSPITAL | Age: 68
End: 2023-04-07

## 2023-04-07 DIAGNOSIS — M25.532 LEFT WRIST PAIN: Primary | ICD-10-CM

## 2023-04-12 LAB
LEFT EYE DIABETIC RETINOPATHY: POSITIVE
RIGHT EYE DIABETIC RETINOPATHY: POSITIVE

## 2023-04-26 DIAGNOSIS — E11.9 TYPE 2 DIABETES MELLITUS WITHOUT COMPLICATION, WITHOUT LONG-TERM CURRENT USE OF INSULIN (HCC): ICD-10-CM

## 2023-04-27 RX ORDER — INSULIN LISPRO 100 [IU]/ML
INJECTION, SOLUTION INTRAVENOUS; SUBCUTANEOUS
Qty: 60 ML | Refills: 2 | Status: SHIPPED | OUTPATIENT
Start: 2023-04-27

## 2023-06-26 ENCOUNTER — RA CDI HCC (OUTPATIENT)
Dept: OTHER | Facility: HOSPITAL | Age: 68
End: 2023-06-26

## 2023-06-26 NOTE — PROGRESS NOTES
E11 51   Z79,4   [facility]  coding opportunities          Chart Reviewed number of suggestions sent to Provider: 2     Patients Insurance     Medicare Insurance: Medicare

## 2023-07-03 ENCOUNTER — OFFICE VISIT (OUTPATIENT)
Dept: FAMILY MEDICINE CLINIC | Facility: HOSPITAL | Age: 68
End: 2023-07-03
Payer: MEDICARE

## 2023-07-03 VITALS
WEIGHT: 187 LBS | SYSTOLIC BLOOD PRESSURE: 138 MMHG | DIASTOLIC BLOOD PRESSURE: 80 MMHG | OXYGEN SATURATION: 97 % | HEIGHT: 61 IN | BODY MASS INDEX: 35.3 KG/M2 | HEART RATE: 64 BPM

## 2023-07-03 DIAGNOSIS — E78.00 PURE HYPERCHOLESTEROLEMIA: ICD-10-CM

## 2023-07-03 DIAGNOSIS — I10 BENIGN ESSENTIAL HYPERTENSION: ICD-10-CM

## 2023-07-03 DIAGNOSIS — F41.9 ANXIETY: ICD-10-CM

## 2023-07-03 DIAGNOSIS — F11.20 CONTINUOUS OPIOID DEPENDENCE (HCC): ICD-10-CM

## 2023-07-03 DIAGNOSIS — Z01.818 PREOP EXAMINATION: ICD-10-CM

## 2023-07-03 DIAGNOSIS — E66.01 OBESITY, MORBID (HCC): ICD-10-CM

## 2023-07-03 DIAGNOSIS — E11.9 TYPE 2 DIABETES MELLITUS WITHOUT COMPLICATION, WITHOUT LONG-TERM CURRENT USE OF INSULIN (HCC): Primary | ICD-10-CM

## 2023-07-03 DIAGNOSIS — E03.9 ACQUIRED HYPOTHYROIDISM: ICD-10-CM

## 2023-07-03 DIAGNOSIS — H25.9 SENILE CATARACT OF RIGHT EYE, UNSPECIFIED AGE-RELATED CATARACT TYPE: ICD-10-CM

## 2023-07-03 PROCEDURE — 99214 OFFICE O/P EST MOD 30 MIN: CPT | Performed by: STUDENT IN AN ORGANIZED HEALTH CARE EDUCATION/TRAINING PROGRAM

## 2023-07-03 RX ORDER — BLOOD SUGAR DIAGNOSTIC
STRIP MISCELLANEOUS 4 TIMES DAILY
Qty: 100 EACH | Refills: 0 | Status: SHIPPED | OUTPATIENT
Start: 2023-07-03

## 2023-07-03 NOTE — PROGRESS NOTES
FAMILY PRACTICE PRE-OPERATIVE EVALUATION  [facility] PHYSICIAN GROUP - [facility] CARE [address]  NAME: Olu Fall  AGE: 79 y.o. SEX: female  : 1955   DATE: 7/3/2023  History of Present Illness:     Olu Fall is a 79 y.o. female who presents to the office today for a preoperative consultation at the request of surgeon, Dr. Julius Maynard, who plans on performing R eye cataract with IOL on 23. Planned anesthesia is local. Patient has a bleeding risk of: no recent abnormal bleeding. Patient does not have objections to receiving blood products if needed. Current anti-platelet/anti-coagulation medications that the patient is prescribed includes: Aspirin. Assessment of Chronic Conditions:   - Diabetes Mellitus: well controlled on dual insulins   - Hypertension: well controlled on 3 meds     Assessment of Cardiac Risk:  · Denies unstable or severe angina or MI in the last 6 weeks or history of stent placement in the last year   · Denies decompensated heart failure (e.g. New onset heart failure, NYHA functional class IV heart failure, or worsening existing heart failure)  · Denies significant arrhythmias such as high grade AV block, symptomatic ventricular arrhythmia, newly recognized ventricular tachycardia, supraventricular tachycardia with resting heart rate >100, or symptomatic bradycardia  · Denies severe heart valve disease including aortic stenosis or symptomatic mitral stenosis     Exercise Capacity:  · Able to walk 4 blocks without symptoms?: Yes  · Able to walk 2 flights without symptoms?: Yes    Prior Anesthesia Reactions: No  Personal history of venous thromboembolic disease? No  History of steroid use for >2 weeks within last year? No    Depression Screening and Follow-up Plan: Patient advised to follow-up with PCP for further management. Review of Systems:     Review of Systems   Constitutional: Positive for fatigue (tired).  Negative for chills, diaphoresis and fever. HENT:        Hair loss, dry skin    Respiratory: Negative for cough and shortness of breath. Cardiovascular: Negative for chest pain and palpitations. Gastrointestinal: Negative for constipation and diarrhea. Endocrine: Negative for cold intolerance and heat intolerance. Neurological: Positive for headaches (more recently). Negative for dizziness and light-headedness. Current Problem List:     Patient Active Problem List   Diagnosis   • Anxiety   • Benign essential hypertension   • Acquired hypothyroidism   • Type 2 diabetes mellitus without complication, without long-term current use of insulin ([address])   • Lumbar stenosis with neurogenic claudication   • Sjogrens syndrome ([address])   • Thyromegaly   • Osteoarthritis of right knee   • Pure hypercholesterolemia   • Primary osteoarthritis of right knee   • Gastroesophageal reflux disease without esophagitis   • PAD (peripheral artery disease) ([facility])   • Grief reaction   • Continuous opioid dependence ([address])   • Depression, recurrent ([facility])   • Mild nonproliferative diabetic retinopathy of both eyes without macular edema associated with type 2 diabetes mellitus ([address])   • Left wrist pain   • Obesity, morbid ([facility])       Allergies:      Allergies   Allergen Reactions   • Niacin Anaphylaxis     [facility] [id number]: Flushing   • Ciprofloxacin    • Pioglitazone Hives   • Cefprozil Rash   • Cefuroxime Rash   • Ramipril Rash       Current Medications:       Current Outpatient Medications:   •  amLODIPine (NORVASC) 5 mg tablet, TAKE 1 TABLET DAILY, Disp: 90 tablet, Rfl: 3  •  aspirin 81 MG tablet, Take 1 tablet by mouth daily, Disp: , Rfl:   •  diclofenac potassium (CATAFLAM) 50 mg tablet, Take 1 tablet (50 mg total) by mouth 3 (three) times a day, Disp: 270 tablet, Rfl: 3  •  fluticasone (FLONASE) 50 mcg/act nasal spray, 1 spray into each nostril daily, Disp: 11.1 mL, Rfl: 0  •  glucose blood (OneTouch Verio) test strip, TEST BLOOD SUGAR 4 TIMES A DAY, Disp: 150 strip, Rfl: 3  •  insulin lispro (HumaLOG KwikPen) 100 units/mL injection pen, INJECT 20 UNITS UNDER THE SKIN THREE TIMES A DAY WITH MEALS PER SLIDING SCALE, Disp: 60 mL, Rfl: 2  •  Insulin Syringe-Needle U-100 31G X 5/16" 0.3 ML MISC, Use 4 (four) times a day, Disp: 100 each, Rfl: 0  •  Lantus SoloStar 100 units/mL SOPN, INJECT 30 UNITS AT BEDTIME, Disp: 15 mL, Rfl: 6  •  levothyroxine 100 mcg tablet, TAKE 1 TABLET DAILY, Disp: 90 tablet, Rfl: 3  •  metoprolol tartrate (LOPRESSOR) 50 mg tablet, TAKE 1 TABLET TWICE A DAY, Disp: 180 tablet, Rfl: 3  •  omeprazole (PriLOSEC) 20 mg delayed release capsule, TAKE 1 CAPSULE DAILY, Disp: 90 capsule, Rfl: 1  •  OneTouch Delica Lancets 06Y MISC, Testing 4 times daily, Disp: 200 each, Rfl: 5  •  simvastatin (ZOCOR) 20 mg tablet, TAKE 1 TABLET DAILY, Disp: 90 tablet, Rfl: 3  •  traMADol (ULTRAM) 50 mg tablet, Take 1 tablet (50 mg total) by mouth 2 (two) times a day as needed for moderate pain, Disp: 60 tablet, Rfl: 3  •  valsartan-hydrochlorothiazide (DIOVAN-HCT) 160-25 MG per tablet, TAKE 1 TABLET DAILY, Disp: 90 tablet, Rfl: 3    Past Medical History:       Past Medical History:   Diagnosis Date   • Anxiety    • Diabetes mellitus (HCC)    • GERD (gastroesophageal reflux disease)    • History of total left knee replacement (TKR)    • Hyperlipidemia    • Hypertension    • Lumbar stenosis    • Obesity    • Sjogren's syndrome (HCC)    • TMJ (dislocation of temporomandibular joint)    • Trigeminal neuralgia         Past Surgical History:   Procedure Laterality Date   • APPENDECTOMY     • BREAST BIOPSY Right 2018   • CARDIAC CATHETERIZATION     •  SECTION  78, 84, 85, 89   • COLONOSCOPY     • HYSTERECTOMY     • JOINT REPLACEMENT     • KNEE SURGERY     • LYMPH NODE BIOPSY     • OOPHORECTOMY     • TONSILLECTOMY     • US GUIDED BREAST BIOPSY RIGHT COMPLETE Right 2018        Family History   Problem Relation Age of Onset   • Colon cancer Mother 63's   • Dementia Father    • Depression Father    • Diabetes Father    • Stroke Father    • Parkinsonism Father    • GI problems Family         GI malignancy   • No Known Problems Sister    • No Known Problems Maternal Grandmother    • No Known Problems Maternal Grandfather    • No Known Problems Paternal Grandmother    • No Known Problems Paternal Grandfather    • No Known Problems Sister    • No Known Problems Sister    • No Known Problems Maternal Aunt    • No Known Problems Maternal Aunt    • No Known Problems Maternal Aunt    • No Known Problems Maternal Aunt    • No Known Problems Maternal Aunt    • Breast cancer Paternal Aunt         46's   • No Known Problems Paternal Aunt    • No Known Problems Paternal Aunt    • No Known Problems Paternal Aunt    • Endometrial cancer Neg Hx    • Ovarian cancer Neg Hx         Social History     Socioeconomic History   • Marital status:      Spouse name: Not on file   • Number of children: Not on file   • Years of education: Not on file   • Highest education level: Not on file   Occupational History   • Not on file   Tobacco Use   • Smoking status: Never   • Smokeless tobacco: Never   Vaping Use   • Vaping Use: Never used   Substance and Sexual Activity   • Alcohol use: No   • Drug use: No   • Sexual activity: Not Currently   Other Topics Concern   • Not on file   Social History Narrative    Caffeine use: 1-2 times per week    Dental care, regularly    Living situation: supportive and safe    No advance directive on file         Social Determinants of Health     Financial Resource Strain: Low Risk  (8/25/2022)    Overall Financial Resource Strain (CARDIA)    • Difficulty of Paying Living Expenses: Not hard at all   Food Insecurity: Not on file   Transportation Needs: No Transportation Needs (8/25/2022)    PRAPARE - Transportation    • Lack of Transportation (Medical): No    • Lack of Transportation (Non-Medical):  No   Physical Activity: Not on file   Stress: Not on file   Social Connections: Not on file   Intimate Partner Violence: Not on file   Housing Stability: Not on file        Physical Exam:     /80   Pulse 64   Ht 5' 1" (1.549 m)   Wt 84.8 kg (187 lb)   LMP  (LMP Unknown)   SpO2 97%   BMI 35.33 kg/m²     Physical Exam     No known reason for new onset weight gain. Wt Readings from Last 3 Encounters:   07/03/23 84.8 kg (187 lb)   04/04/23 81.2 kg (179 lb)   03/15/23 79.4 kg (175 lb)      Data:     Pre-operative work-up  Laboratory Results: I have personally reviewed the pertinent laboratory results/reports   March CBC normal, April BMP normal      Home glucose 105, Never above 200. Assessment & Recommendations:     1. Type 2 diabetes mellitus without complication, without long-term current use of insulin (HCC)  Insulin Syringe-Needle U-100 31G X 5/16" 0.3 ML MISC      2. Preop examination        3. Senile cataract of right eye, unspecified age-related cataract type        4. Anxiety        5. Benign essential hypertension        6. Acquired hypothyroidism        7. Pure hypercholesterolemia        8. Obesity, morbid ([address])        9. Continuous opioid dependence ([address])          Pre-Op Evaluation Assessment  79 y.o. female with planned surgery: R cataract & IOL  Known risk factors for perioperative complications: None. Current medications which may produce withdrawal symptoms if withheld perioperatively: rare use tramadol. Pre-Op Evaluation Plan  1. Further preoperative workup as follows:   - None; no further preoperative work-up is required    2. Medication Management/Recommendations:   - None, continue medication regimen including morning of surgery, with sip of water  - Patient has been instructed to avoid herbs or non-directed vitamins the week prior to surgery to ensure no drug interactions with perioperative surgical and anesthetic medications.   - Patient should continue antihypertensive medications up through and including the day of surgery. - Patient should continue beta-blocker medication up through and including the day of surgery. - Insulin Management: Hold nighttime dose before    3. Prophylaxis for cardiac events with perioperative beta-blockers: is on .    4. Patient requires further consultation with: None    Clearance  Patient is CLEARED for surgery without any additional cardiac testing.      Jules Anne DO  [facility] [address] [address]  Phone#  [phone]  Fax#  [phone]

## 2023-07-10 ENCOUNTER — APPOINTMENT (OUTPATIENT)
Dept: LAB | Facility: HOSPITAL | Age: 68
End: 2023-07-10
Payer: MEDICARE

## 2023-07-10 DIAGNOSIS — E03.9 ACQUIRED HYPOTHYROIDISM: ICD-10-CM

## 2023-07-10 DIAGNOSIS — E11.9 TYPE 2 DIABETES MELLITUS WITHOUT COMPLICATION, WITHOUT LONG-TERM CURRENT USE OF INSULIN (HCC): ICD-10-CM

## 2023-07-10 DIAGNOSIS — Z13.0 SCREENING FOR IRON DEFICIENCY ANEMIA: ICD-10-CM

## 2023-07-10 DIAGNOSIS — D72.829 LEUKOCYTOSIS, UNSPECIFIED TYPE: ICD-10-CM

## 2023-07-10 DIAGNOSIS — I10 BENIGN ESSENTIAL HYPERTENSION: ICD-10-CM

## 2023-07-10 LAB
ALBUMIN SERPL BCP-MCNC: 3.9 G/DL (ref 3.5–5)
ALP SERPL-CCNC: 114 U/L (ref 46–116)
ALT SERPL W P-5'-P-CCNC: 14 U/L (ref 12–78)
ANION GAP SERPL CALCULATED.3IONS-SCNC: 5 MMOL/L
AST SERPL W P-5'-P-CCNC: 10 U/L (ref 5–45)
BASOPHILS # BLD AUTO: 0.05 THOUSANDS/ÂΜL (ref 0–0.1)
BASOPHILS NFR BLD AUTO: 0 % (ref 0–1)
BILIRUB SERPL-MCNC: 0.54 MG/DL (ref 0.2–1)
BUN SERPL-MCNC: 16 MG/DL (ref 5–25)
CALCIUM SERPL-MCNC: 9.2 MG/DL (ref 8.3–10.1)
CHLORIDE SERPL-SCNC: 105 MMOL/L (ref 96–108)
CO2 SERPL-SCNC: 31 MMOL/L (ref 21–32)
CREAT SERPL-MCNC: 0.96 MG/DL (ref 0.6–1.3)
EOSINOPHIL # BLD AUTO: 0.43 THOUSAND/ÂΜL (ref 0–0.61)
EOSINOPHIL NFR BLD AUTO: 3 % (ref 0–6)
ERYTHROCYTE [DISTWIDTH] IN BLOOD BY AUTOMATED COUNT: 12.7 % (ref 11.6–15.1)
GFR SERPL CREATININE-BSD FRML MDRD: 61 ML/MIN/1.73SQ M
GLUCOSE P FAST SERPL-MCNC: 125 MG/DL (ref 65–99)
HCT VFR BLD AUTO: 40.5 % (ref 34.8–46.1)
HGB BLD-MCNC: 13.4 G/DL (ref 11.5–15.4)
IMM GRANULOCYTES # BLD AUTO: 0.08 THOUSAND/UL (ref 0–0.2)
IMM GRANULOCYTES NFR BLD AUTO: 1 % (ref 0–2)
LYMPHOCYTES # BLD AUTO: 4.07 THOUSANDS/ÂΜL (ref 0.6–4.47)
LYMPHOCYTES NFR BLD AUTO: 25 % (ref 14–44)
MCH RBC QN AUTO: 28.9 PG (ref 26.8–34.3)
MCHC RBC AUTO-ENTMCNC: 33.1 G/DL (ref 31.4–37.4)
MCV RBC AUTO: 88 FL (ref 82–98)
MONOCYTES # BLD AUTO: 0.99 THOUSAND/ÂΜL (ref 0.17–1.22)
MONOCYTES NFR BLD AUTO: 6 % (ref 4–12)
NEUTROPHILS # BLD AUTO: 10.45 THOUSANDS/ÂΜL (ref 1.85–7.62)
NEUTS SEG NFR BLD AUTO: 65 % (ref 43–75)
NRBC BLD AUTO-RTO: 0 /100 WBCS
PLATELET # BLD AUTO: 292 THOUSANDS/UL (ref 149–390)
PMV BLD AUTO: 11.4 FL (ref 8.9–12.7)
POTASSIUM SERPL-SCNC: 4.1 MMOL/L (ref 3.5–5.3)
PROT SERPL-MCNC: 7.9 G/DL (ref 6.4–8.4)
RBC # BLD AUTO: 4.63 MILLION/UL (ref 3.81–5.12)
SODIUM SERPL-SCNC: 141 MMOL/L (ref 135–147)
TSH SERPL DL<=0.05 MIU/L-ACNC: 0.06 UIU/ML (ref 0.45–4.5)
WBC # BLD AUTO: 16.07 THOUSAND/UL (ref 4.31–10.16)

## 2023-07-10 PROCEDURE — 36415 COLL VENOUS BLD VENIPUNCTURE: CPT

## 2023-07-10 PROCEDURE — 84443 ASSAY THYROID STIM HORMONE: CPT

## 2023-07-10 PROCEDURE — 83036 HEMOGLOBIN GLYCOSYLATED A1C: CPT

## 2023-07-10 PROCEDURE — 85025 COMPLETE CBC W/AUTO DIFF WBC: CPT

## 2023-07-10 PROCEDURE — 80053 COMPREHEN METABOLIC PANEL: CPT

## 2023-07-11 DIAGNOSIS — E11.9 TYPE 2 DIABETES MELLITUS WITHOUT COMPLICATION, WITHOUT LONG-TERM CURRENT USE OF INSULIN (HCC): Primary | ICD-10-CM

## 2023-07-11 NOTE — TELEPHONE ENCOUNTER
Got insulin syringes and she uses the pens. Would you please do a new script for the pens? She is completely out. Are you able to call this in today? Do you know of anyone that could use the syringes? She has a whole box.

## 2023-07-12 LAB
EST. AVERAGE GLUCOSE BLD GHB EST-MCNC: 143 MG/DL
HBA1C MFR BLD: 6.6 %

## 2023-08-08 DIAGNOSIS — K21.9 GASTROESOPHAGEAL REFLUX DISEASE WITHOUT ESOPHAGITIS: ICD-10-CM

## 2023-08-08 RX ORDER — OMEPRAZOLE 20 MG/1
20 CAPSULE, DELAYED RELEASE ORAL DAILY
Qty: 90 CAPSULE | Refills: 3 | Status: SHIPPED | OUTPATIENT
Start: 2023-08-08

## 2023-08-09 DIAGNOSIS — E11.9 TYPE 2 DIABETES MELLITUS WITHOUT COMPLICATION, WITHOUT LONG-TERM CURRENT USE OF INSULIN (HCC): ICD-10-CM

## 2023-08-09 LAB
LEFT EYE DIABETIC RETINOPATHY: POSITIVE
RIGHT EYE DIABETIC RETINOPATHY: POSITIVE

## 2023-08-09 RX ORDER — BLOOD SUGAR DIAGNOSTIC
STRIP MISCELLANEOUS
Qty: 150 STRIP | Refills: 3 | Status: SHIPPED | OUTPATIENT
Start: 2023-08-09

## 2023-09-06 ENCOUNTER — HOSPITAL ENCOUNTER (OUTPATIENT)
Dept: MAMMOGRAPHY | Facility: IMAGING CENTER | Age: 68
Discharge: HOME | End: 2023-09-06
Payer: MEDICARE

## 2023-09-06 ENCOUNTER — HOSPITAL ENCOUNTER (OUTPATIENT)
Dept: BONE DENSITY | Facility: IMAGING CENTER | Age: 68
Discharge: HOME | End: 2023-09-06
Payer: MEDICARE

## 2023-09-06 VITALS — HEIGHT: 61 IN | WEIGHT: 187 LBS | BODY MASS INDEX: 35.3 KG/M2

## 2023-09-06 VITALS — BODY MASS INDEX: 36.79 KG/M2 | WEIGHT: 187.4 LBS | HEIGHT: 60 IN

## 2023-09-06 DIAGNOSIS — Z12.31 ENCOUNTER FOR SCREENING MAMMOGRAM FOR BREAST CANCER: ICD-10-CM

## 2023-09-06 DIAGNOSIS — Z78.0 POSTMENOPAUSAL STATE: ICD-10-CM

## 2023-09-06 PROCEDURE — 77080 DXA BONE DENSITY AXIAL: CPT

## 2023-09-06 PROCEDURE — 77067 SCR MAMMO BI INCL CAD: CPT

## 2023-09-06 PROCEDURE — 77063 BREAST TOMOSYNTHESIS BI: CPT

## 2023-09-22 DIAGNOSIS — M85.852 OSTEOPENIA OF LEFT HIP: Primary | ICD-10-CM

## 2023-09-22 PROBLEM — M85.859 OSTEOPENIA OF HIP: Status: ACTIVE | Noted: 2023-09-22

## 2023-09-22 RX ORDER — B-COMPLEX WITH VITAMIN C
1 TABLET ORAL 2 TIMES DAILY WITH MEALS
Qty: 180 TABLET | Refills: 3 | Status: SHIPPED | OUTPATIENT
Start: 2023-09-22

## 2023-10-03 LAB
LEFT EYE DIABETIC RETINOPATHY: POSITIVE
RIGHT EYE DIABETIC RETINOPATHY: POSITIVE

## 2023-10-20 ENCOUNTER — RA CDI HCC (OUTPATIENT)
Dept: OTHER | Facility: HOSPITAL | Age: 68
End: 2023-10-20

## 2023-10-20 NOTE — PROGRESS NOTES
E11.51  Z79.4   [facility] coding opportunities          Chart Reviewed number of suggestions sent to Provider: 2     Patients Insurance     Medicare Insurance: [employer]

## 2023-10-24 ENCOUNTER — OFFICE VISIT (OUTPATIENT)
Dept: FAMILY MEDICINE CLINIC | Facility: HOSPITAL | Age: 68
End: 2023-10-24
Payer: MEDICARE

## 2023-10-24 VITALS
WEIGHT: 188 LBS | DIASTOLIC BLOOD PRESSURE: 60 MMHG | HEIGHT: 60 IN | HEART RATE: 70 BPM | SYSTOLIC BLOOD PRESSURE: 122 MMHG | OXYGEN SATURATION: 98 % | BODY MASS INDEX: 36.91 KG/M2

## 2023-10-24 DIAGNOSIS — E11.9 TYPE 2 DIABETES MELLITUS WITHOUT COMPLICATION, WITHOUT LONG-TERM CURRENT USE OF INSULIN (HCC): ICD-10-CM

## 2023-10-24 DIAGNOSIS — H25.9 SENILE CATARACT OF LEFT EYE, UNSPECIFIED AGE-RELATED CATARACT TYPE: ICD-10-CM

## 2023-10-24 DIAGNOSIS — E03.9 ACQUIRED HYPOTHYROIDISM: ICD-10-CM

## 2023-10-24 DIAGNOSIS — Z01.818 PRE-OP EXAM: Primary | ICD-10-CM

## 2023-10-24 DIAGNOSIS — Z23 ENCOUNTER FOR IMMUNIZATION: ICD-10-CM

## 2023-10-24 DIAGNOSIS — I10 BENIGN ESSENTIAL HYPERTENSION: ICD-10-CM

## 2023-10-24 LAB — SL AMB POCT HEMOGLOBIN AIC: 6.1 (ref ?–6.5)

## 2023-10-24 PROCEDURE — 90662 IIV NO PRSV INCREASED AG IM: CPT

## 2023-10-24 PROCEDURE — 99214 OFFICE O/P EST MOD 30 MIN: CPT | Performed by: STUDENT IN AN ORGANIZED HEALTH CARE EDUCATION/TRAINING PROGRAM

## 2023-10-24 PROCEDURE — G0008 ADMIN INFLUENZA VIRUS VAC: HCPCS

## 2023-10-24 PROCEDURE — 83036 HEMOGLOBIN GLYCOSYLATED A1C: CPT | Performed by: STUDENT IN AN ORGANIZED HEALTH CARE EDUCATION/TRAINING PROGRAM

## 2023-10-24 NOTE — PROGRESS NOTES
[facility] PRACTICE PRE-OPERATIVE EVALUATION  [facility] PHYSICIAN GROUP    [facility] PRIMARY CARE [address]  NAME: Amy Angel  AGE: 79 y.o. SEX: female  : 1955   DATE: 10/24/2023     History of Present Illness:     Amy Angel is a 79 y.o. female who presents to the office today for a preoperative consultation at the request of surgeon, Dr. Amy Henderson, who plans on performing L cataract extraction with IOL on . Planned anesthesia is local. Patient has a bleeding risk of: no recent abnormal bleeding and no remote history of abnormal bleeding. Patient does not have objections to receiving blood products if needed. Current anti-platelet/anti-coagulation medications that the patient is prescribed includes: ASA 81 mg. Assessment of Chronic Conditions:   - Diabetes Mellitus: controlled on dual insulins A1c 6.8 today   - Hypertension: stable on meds  - Hypothyroidism: on levothyroxine     Assessment of Cardiac Risk:  Denies unstable or severe angina or MI in the last 6 weeks or history of stent placement in the last year   Denies decompensated heart failure (e.g. New onset heart failure, NYHA functional class IV heart failure, or worsening existing heart failure)  Denies significant arrhythmias such as high grade AV block, symptomatic ventricular arrhythmia, newly recognized ventricular tachycardia, supraventricular tachycardia with resting heart rate >100, or symptomatic bradycardia  Denies severe heart valve disease including aortic stenosis or symptomatic mitral stenosis     Exercise Capacity:  Able to walk 4 blocks without symptoms?: Yes  Prior Anesthesia Reactions: No  Personal history of venous thromboembolic disease? No  History of steroid use for >2 weeks within last year? No    Depression Screening and Follow-up Plan: Patient assessed for underlying major depression. Brief counseling provided and recommend additional follow-up/re-evaluation next office visit.  Mostly grief more than anything else    Falls Plan of Care: balance, strength, and gait training instructions were provided. Review of Systems:     Review of Systems   Constitutional:  Negative for chills and fever. Respiratory:  Negative for cough and shortness of breath. Cardiovascular:  Negative for chest pain and palpitations. Neurological:  Positive for headaches (from vision being off due to cataract). Negative for light-headedness. Current Problem List:     Patient Active Problem List   Diagnosis    Anxiety    Benign essential hypertension    Acquired hypothyroidism    Type 2 diabetes mellitus without complication, without long-term current use of insulin ([facility])    Lumbar stenosis with neurogenic claudication    Sjogrens syndrome ([facility])    Thyromegaly    Osteoarthritis of right knee    Pure hypercholesterolemia    Primary osteoarthritis of right knee    Gastroesophageal reflux disease without esophagitis    PAD (peripheral artery disease) ([facility])    Grief reaction    Continuous opioid dependence ([facility])    Depression, recurrent ([facility])    Mild nonproliferative diabetic retinopathy of both eyes without macular edema associated with type 2 diabetes mellitus ([facility])    Left wrist pain    Obesity, morbid ([facility])    Osteopenia of hip     Allergies:      Allergies   Allergen Reactions    Niacin Anaphylaxis     [facility] - [id number]: Flushing    Ciprofloxacin     Pioglitazone Hives    Cefprozil Rash    Cefuroxime Rash    Ramipril Rash       Current Medications:       Current Outpatient Medications:     amLODIPine (NORVASC) 5 mg tablet, TAKE 1 TABLET DAILY, Disp: 90 tablet, Rfl: 3    aspirin 81 MG tablet, Take 1 tablet by mouth daily, Disp: , Rfl:     calcium carbonate-vitamin D 500 mg-5 mcg per tablet, Take 1 tablet by mouth 2 (two) times a day with meals, Disp: 180 tablet, Rfl: 3    diclofenac potassium (CATAFLAM) 50 mg tablet, Take 1 tablet (50 mg total) by mouth 3 (three) times a day, Disp: 270 tablet, Rfl: 3    fluticasone (FLONASE) 50 mcg/act nasal spray, 1 spray into each nostril daily, Disp: 11.1 mL, Rfl: 0    glucose blood (OneTouch Verio) test strip, USE TO TEST BLOOD SUGAR FOUR TIMES A DAY, Disp: 150 strip, Rfl: 3    insulin lispro (HumaLOG KwikPen) 100 units/mL injection pen, INJECT 20 UNITS UNDER THE SKIN THREE TIMES A DAY WITH MEALS PER SLIDING SCALE, Disp: 60 mL, Rfl: 2    Insulin Pen Needle 31G X 8 MM MISC, USE 3 PEN NEEDLES DAILY, Disp: 100 each, Rfl: 5    Insulin Syringe-Needle U-100 31G X 5/16" 0.3 ML MISC, Use 4 (four) times a day, Disp: 100 each, Rfl: 0    Lantus SoloStar 100 units/mL SOPN, INJECT 30 UNITS AT BEDTIME, Disp: 15 mL, Rfl: 6    levothyroxine 100 mcg tablet, TAKE 1 TABLET DAILY, Disp: 90 tablet, Rfl: 3    metoprolol tartrate (LOPRESSOR) 50 mg tablet, TAKE 1 TABLET TWICE A DAY, Disp: 180 tablet, Rfl: 3    omeprazole (PriLOSEC) 20 mg delayed release capsule, Take 1 capsule (20 mg total) by mouth daily, Disp: 90 capsule, Rfl: 3    OneTouch Delica Lancets 57T MISC, Testing 4 times daily, Disp: 200 each, Rfl: 5    simvastatin (ZOCOR) 20 mg tablet, TAKE 1 TABLET DAILY, Disp: 90 tablet, Rfl: 3    traMADol (ULTRAM) 50 mg tablet, Take 1 tablet (50 mg total) by mouth 2 (two) times a day as needed for moderate pain, Disp: 60 tablet, Rfl: 3    valsartan-hydrochlorothiazide (DIOVAN-HCT) 160-25 MG per tablet, TAKE 1 TABLET DAILY, Disp: 90 tablet, Rfl: 3    Past Medical History:       Past Medical History:   Diagnosis Date    Anxiety     Diabetes mellitus (HCC)     GERD (gastroesophageal reflux disease)     History of total left knee replacement (TKR) 2013    Hyperlipidemia     Hypertension     Lumbar stenosis     Obesity     Sjogren's syndrome (HCC)     TMJ (dislocation of temporomandibular joint)     Trigeminal neuralgia         Past Surgical History:   Procedure Laterality Date    APPENDECTOMY      BREAST BIOPSY Right 2018    Benign    CARDIAC CATHETERIZATION       SECTION  78, 84, 85, 89    COLONOSCOPY HYSTERECTOMY  1990    JOINT REPLACEMENT      KNEE SURGERY      LYMPH NODE BIOPSY      OOPHORECTOMY Bilateral 1990    TONSILLECTOMY      US GUIDED BREAST BIOPSY RIGHT COMPLETE Right 09/05/2018        Family History   Problem Relation Age of Onset    Colon cancer Mother         63's    Dementia Father     Depression Father     Diabetes Father     Stroke Father     Parkinsonism Father     No Known Problems Sister     No Known Problems Sister     No Known Problems Sister     No Known Problems Maternal Grandmother     No Known Problems Maternal Grandfather     No Known Problems Paternal Grandmother     No Known Problems Paternal Grandfather     No Known Problems Maternal Aunt     No Known Problems Maternal Aunt     No Known Problems Maternal Aunt     No Known Problems Maternal Aunt     No Known Problems Maternal Aunt     Breast cancer Paternal Aunt         52's    No Known Problems Paternal Aunt     No Known Problems Paternal Aunt     No Known Problems Paternal Aunt     GI problems Family         GI malignancy    Endometrial cancer Neg Hx     Ovarian cancer Neg Hx         Social History     Socioeconomic History    Marital status:       Spouse name: Not on file    Number of children: Not on file    Years of education: Not on file    Highest education level: Not on file   Occupational History    Not on file   Tobacco Use    Smoking status: Never    Smokeless tobacco: Never   Vaping Use    Vaping Use: Never used   Substance and Sexual Activity    Alcohol use: No    Drug use: No    Sexual activity: Not Currently   Other Topics Concern    Not on file   Social History Narrative    Caffeine use: 1-2 times per week    Dental care, regularly    Living situation: supportive and safe    No advance directive on file         Social Determinants of Health     Financial Resource Strain: Low Risk  (8/25/2022)    Overall Financial Resource Strain (CARDIA)     Difficulty of Paying Living Expenses: Not hard at all   Food Insecurity: Not on file   Transportation Needs: No Transportation Needs (8/25/2022)    PRAPARE - Transportation     Lack of Transportation (Medical): No     Lack of Transportation (Non-Medical): No   Physical Activity: Not on file   Stress: Not on file   Social Connections: Not on file   Intimate Partner Violence: Not on file   Housing Stability: Not on file        Physical Exam:     /60   Pulse 70   Ht 5' (1.524 m)   Wt 85.3 kg (188 lb)   LMP  (LMP Unknown)   SpO2 98%   BMI 36.72 kg/m²     Physical Exam  Vitals and nursing note reviewed. Constitutional:       General: She is not in acute distress. Appearance: Normal appearance. She is obese. She is not ill-appearing. HENT:      Head: Normocephalic and atraumatic. Eyes:      General: No scleral icterus. Right eye: No discharge. Left eye: No discharge. Cardiovascular:      Rate and Rhythm: Normal rate and regular rhythm. Pulses: Normal pulses. Heart sounds: Normal heart sounds. No murmur heard. Pulmonary:      Effort: Pulmonary effort is normal. No respiratory distress. Breath sounds: Normal breath sounds. No stridor. No wheezing. Musculoskeletal:      Cervical back: Normal range of motion and neck supple. No rigidity. Right lower leg: No edema. Left lower leg: No edema. Neurological:      Mental Status: She is alert and oriented to person, place, and time. Gait: Gait normal.   Psychiatric:         Mood and Affect: Mood normal.         Behavior: Behavior normal.         Thought Content: Thought content normal.         Judgment: Judgment normal.          Data:     Pre-operative work-up    Laboratory Results: I have personally reviewed the pertinent laboratory results/reports    Labs in July - grossly normal.         Assessment & Recommendations:     1. Pre-op exam        2. Senile cataract of left eye, unspecified age-related cataract type        3.  Type 2 diabetes mellitus without complication, without long-term current use of insulin (HCC)  POCT hemoglobin A1c      4. Acquired hypothyroidism        5. Benign essential hypertension        6. Encounter for immunization  influenza vaccine, high-dose, PF 0.7 mL (FLUZONE HIGH-DOSE)        Pre-Op Evaluation Assessment  79 y.o. female with planned surgery: L cataract repair with IOL   Known risk factors for perioperative complications: None. Current meds which may produce withdrawal symptoms if withheld perioperatively: None    Pre-Op Evaluation Plan  1. Further preoperative workup as follows:   - None; no further preoperative work-up is required    2. Medication Management/Recommendations:   - None, continue medication regimen including morning of surgery, with sip of water  - Does not need to hold ASA, but can if she would like. 3. Prophylaxis for cardiac events with perioperative beta-blockers: not indicated. 4. Patient requires further consultation with: None    Clearance  Patient is CLEARED for surgery without any additional cardiac testing.      Gareth Dunaway DO  [facility]ARE [address] [place] [address]  Phone#  [phone]  Fax#  [phone]

## 2023-10-24 NOTE — PATIENT INSTRUCTIONS
Fall Prevention for Older Adults   WHAT YOU NEED TO KNOW:   What is fall prevention? Fall prevention includes ways to make your home and other areas safer. Prevention also includes ways you can move more carefully to prevent a fall. What increases my risk for falls? As you age, your muscles weaken and your risk for falls increases. Your risk also increases if you take medicines that make you sleepy or dizzy. You may also be at risk if you have vision or joint problems, have low blood pressure, or are not active. How can I help protect myself from falls? Falls are a common cause of injury for older adults. Do the following to help protect yourself:  Stay active. Exercise can help strengthen your muscles and improve your balance. Your healthcare provider may recommend water aerobics, walking, or [employer]. He or she may also recommend physical therapy to improve your coordination. Never start an exercise program without asking your healthcare provider first.            Wear shoes that fit well and have soles that . Wear shoes both inside and outside. Use slippers with good . Avoid shoes with high heels. Use assistive devices as directed. Your healthcare provider may suggest that you use a cane or walker to help you keep your balance. You may need to have grab bars put in your bathroom near the toilet or in the shower. Stand or sit up slowly. This may help you keep your balance and prevent falls. Manage your medical conditions. Keep all appointments with your healthcare providers. Visit your eye doctor as directed. How can I make my home safer? Add items to prevent falls in the bathroom. Put nonslip strips on your bath or shower floor to prevent you from slipping. Use a bath mat if you do not have carpet in the bathroom. This will prevent you from falling when you step out of the bath or shower. Use a shower seat so you do not need to stand while you shower.  Sit on the toilet or a chair in your bathroom to dry yourself and put on clothing. This will prevent you from losing your balance from drying or dressing yourself while you are standing. Keep paths clear. Remove books, shoes, and other objects from walkways and stairs. Place cords for telephones and lamps out of the way so that you do not need to walk over them. Tape them down if you cannot move them. Remove small rugs. If you cannot remove a rug, secure it with double-sided tape. This will prevent you from tripping. Install bright lights in your home. Use night lights to help light paths to the bathroom or kitchen. Always turn on the light before you start walking. Keep items you use often on shelves within reach. Do not use a step stool to help you reach an item. Paint or place reflective tape on the edges of your stairs. This will help you see the stairs better. How do I plan ahead in case I do fall? Talk with family members, friends, and neighbors to create a fall plan. Someone will need to call for emergency help if you are injured or found unconscious. If possible, keep a mobile phone with you at all times, or wear an emergency alert device. You can contact emergency services by pressing a button on the device. Ask your healthcare provider for more information. Arrange to have someone call your local emergency number (911 in the [address]) if:   You have fallen and are found unconscious. You have fallen and cannot move part of your body. When should I call my doctor? You have fallen and have pain or a headache. You have questions or concerns about your condition or care. CARE AGREEMENT:   You have the right to help plan your care. Learn about your health condition and how it may be treated. Discuss treatment options with your healthcare providers to decide what care you want to receive. You always have the right to refuse treatment. The above information is an  only.  It is not intended as medical advice for individual conditions or treatments. Talk to your doctor, nurse or pharmacist before following any medical regimen to see if it is safe and effective for you. © Copyright Mitchell Romero 2023 Information is for End User's use only and may not be sold, redistributed or otherwise used for commercial purposes. Fall Prevention for Older Adults   WHAT YOU NEED TO KNOW:   What is fall prevention? Fall prevention includes ways to make your home and other areas safer. Prevention also includes ways you can move more carefully to prevent a fall. What increases my risk for falls? As you age, your muscles weaken and your risk for falls increases. Your risk also increases if you take medicines that make you sleepy or dizzy. You may also be at risk if you have vision or joint problems, have low blood pressure, or are not active. How can I help protect myself from falls? Falls are a common cause of injury for older adults. Do the following to help protect yourself:  Stay active. Exercise can help strengthen your muscles and improve your balance. Your healthcare provider may recommend water aerobics, walking, or Od[employer]. He or she may also recommend physical therapy to improve your coordination. Never start an exercise program without asking your healthcare provider first.            Wear shoes that fit well and have soles that . Wear shoes both inside and outside. Use slippers with good . Avoid shoes with high heels. Use assistive devices as directed. Your healthcare provider may suggest that you use a cane or walker to help you keep your balance. You may need to have grab bars put in your bathroom near the toilet or in the shower. Stand or sit up slowly. This may help you keep your balance and prevent falls. Manage your medical conditions. Keep all appointments with your healthcare providers. Visit your eye doctor as directed. How can I make my home safer?        Add items to prevent falls in the bathroom. Put nonslip strips on your bath or shower floor to prevent you from slipping. Use a bath mat if you do not have carpet in the bathroom. This will prevent you from falling when you step out of the bath or shower. Use a shower seat so you do not need to stand while you shower. Sit on the toilet or a chair in your bathroom to dry yourself and put on clothing. This will prevent you from losing your balance from drying or dressing yourself while you are standing. Keep paths clear. Remove books, shoes, and other objects from walkways and stairs. Place cords for telephones and lamps out of the way so that you do not need to walk over them. Tape them down if you cannot move them. Remove small rugs. If you cannot remove a rug, secure it with double-sided tape. This will prevent you from tripping. Install bright lights in your home. Use night lights to help light paths to the bathroom or kitchen. Always turn on the light before you start walking. Keep items you use often on shelves within reach. Do not use a step stool to help you reach an item. Paint or place reflective tape on the edges of your stairs. This will help you see the stairs better. How do I plan ahead in case I do fall? Talk with family members, friends, and neighbors to create a fall plan. Someone will need to call for emergency help if you are injured or found unconscious. If possible, keep a mobile phone with you at all times, or wear an emergency alert device. You can contact emergency services by pressing a button on the device. Ask your healthcare provider for more information. Arrange to have someone call your local emergency number (911 in the [address]) if:   You have fallen and are found unconscious. You have fallen and cannot move part of your body. When should I call my doctor? You have fallen and have pain or a headache. You have questions or concerns about your condition or care.     CARE AGREEMENT: You have the right to help plan your care. Learn about your health condition and how it may be treated. Discuss treatment options with your healthcare providers to decide what care you want to receive. You always have the right to refuse treatment. The above information is an  only. It is not intended as medical advice for individual conditions or treatments. Talk to your doctor, nurse or pharmacist before following any medical regimen to see if it is safe and effective for you. © Copyright Dajuan Aquino 2023 Information is for End User's use only and may not be sold, redistributed or otherwise used for commercial purposes. Fall Prevention for Older Adults   WHAT YOU NEED TO KNOW:   What is fall prevention? Fall prevention includes ways to make your home and other areas safer. Prevention also includes ways you can move more carefully to prevent a fall. What increases my risk for falls? As you age, your muscles weaken and your risk for falls increases. Your risk also increases if you take medicines that make you sleepy or dizzy. You may also be at risk if you have vision or joint problems, have low blood pressure, or are not active. How can I help protect myself from falls? Falls are a common cause of injury for older adults. Do the following to help protect yourself:  Stay active. Exercise can help strengthen your muscles and improve your balance. Your healthcare provider may recommend water aerobics, walking, or [employer]. He or she may also recommend physical therapy to improve your coordination. Never start an exercise program without asking your healthcare provider first.            Wear shoes that fit well and have soles that . Wear shoes both inside and outside. Use slippers with good . Avoid shoes with high heels. Use assistive devices as directed. Your healthcare provider may suggest that you use a cane or walker to help you keep your balance.  You may need to have grab bars put in your bathroom near the toilet or in the shower. Stand or sit up slowly. This may help you keep your balance and prevent falls. Manage your medical conditions. Keep all appointments with your healthcare providers. Visit your eye doctor as directed. How can I make my home safer? Add items to prevent falls in the bathroom. Put nonslip strips on your bath or shower floor to prevent you from slipping. Use a bath mat if you do not have carpet in the bathroom. This will prevent you from falling when you step out of the bath or shower. Use a shower seat so you do not need to stand while you shower. Sit on the toilet or a chair in your bathroom to dry yourself and put on clothing. This will prevent you from losing your balance from drying or dressing yourself while you are standing. Keep paths clear. Remove books, shoes, and other objects from walkways and stairs. Place cords for telephones and lamps out of the way so that you do not need to walk over them. Tape them down if you cannot move them. Remove small rugs. If you cannot remove a rug, secure it with double-sided tape. This will prevent you from tripping. Install bright lights in your home. Use night lights to help light paths to the bathroom or kitchen. Always turn on the light before you start walking. Keep items you use often on shelves within reach. Do not use a step stool to help you reach an item. Paint or place reflective tape on the edges of your stairs. This will help you see the stairs better. How do I plan ahead in case I do fall? Talk with family members, friends, and neighbors to create a fall plan. Someone will need to call for emergency help if you are injured or found unconscious. If possible, keep a mobile phone with you at all times, or wear an emergency alert device. You can contact emergency services by pressing a button on the device. Ask your healthcare provider for more information.   Arrange to have someone call your local emergency number (911 in the [address]) if:   You have fallen and are found unconscious. You have fallen and cannot move part of your body. When should I call my doctor? You have fallen and have pain or a headache. You have questions or concerns about your condition or care. CARE AGREEMENT:   You have the right to help plan your care. Learn about your health condition and how it may be treated. Discuss treatment options with your healthcare providers to decide what care you want to receive. You always have the right to refuse treatment. The above information is an  only. It is not intended as medical advice for individual conditions or treatments. Talk to your doctor, nurse or pharmacist before following any medical regimen to see if it is safe and effective for you. © Copyright Avery Norton 2023 Information is for End User's use only and may not be sold, redistributed or otherwise used for commercial purposes.

## 2023-11-01 DIAGNOSIS — E11.9 TYPE 2 DIABETES MELLITUS WITHOUT COMPLICATION, WITHOUT LONG-TERM CURRENT USE OF INSULIN (HCC): ICD-10-CM

## 2023-11-01 RX ORDER — INSULIN GLARGINE 100 [IU]/ML
INJECTION, SOLUTION SUBCUTANEOUS
Qty: 45 ML | Refills: 3 | Status: SHIPPED | OUTPATIENT
Start: 2023-11-01

## 2023-11-01 RX ORDER — INSULIN GLARGINE 100 [IU]/ML
INJECTION, SOLUTION SUBCUTANEOUS
Qty: 45 ML | Refills: 3 | Status: SHIPPED | OUTPATIENT
Start: 2023-11-01 | End: 2023-11-01 | Stop reason: SDUPTHER

## 2023-11-29 ENCOUNTER — TELEPHONE (OUTPATIENT)
Dept: FAMILY MEDICINE CLINIC | Facility: HOSPITAL | Age: 68
End: 2023-11-29

## 2023-11-29 NOTE — TELEPHONE ENCOUNTER
Osmar Arrieta hasn't been feeling well - ears hurt but not earache, dry heaving was dizzy - asking if there is a test for sjogrens?       She is not able to come in to the office

## 2023-12-04 NOTE — TELEPHONE ENCOUNTER
Pt notified. States she is feeling better. Has AWV 12/11, and will discuss at that time if any further concerns.  CR

## 2023-12-05 DIAGNOSIS — J02.9 ACUTE VIRAL PHARYNGITIS: ICD-10-CM

## 2023-12-05 RX ORDER — FLUTICASONE PROPIONATE 50 MCG
1 SPRAY, SUSPENSION (ML) NASAL DAILY
Qty: 11.1 ML | Refills: 0 | Status: SHIPPED | OUTPATIENT
Start: 2023-12-05

## 2023-12-13 ENCOUNTER — TELEPHONE (OUTPATIENT)
Dept: FAMILY MEDICINE CLINIC | Facility: HOSPITAL | Age: 68
End: 2023-12-13

## 2023-12-13 NOTE — TELEPHONE ENCOUNTER
Suyapa Thorntonty has an appt.  On 1/16 w/ Dr. Alex Arthur but she now has a terrible sinus infection - head pounding above her eye - blowing green -facial pressure - asking for a Z-jennifer to be called into [facility]

## 2023-12-14 NOTE — TELEPHONE ENCOUNTER
Asked Kathy Haynes if she could call this in - she said right now that is how RSV is showing up - wanted Avel Enamorado to be seen Avel Enamorado isn't able she will try to treat w/ OTC medications

## 2023-12-19 ENCOUNTER — OFFICE VISIT (OUTPATIENT)
Dept: FAMILY MEDICINE CLINIC | Facility: HOSPITAL | Age: 68
End: 2023-12-19
Payer: MEDICARE

## 2023-12-19 ENCOUNTER — TELEPHONE (OUTPATIENT)
Dept: FAMILY MEDICINE CLINIC | Facility: HOSPITAL | Age: 68
End: 2023-12-19

## 2023-12-19 VITALS
TEMPERATURE: 97.9 F | OXYGEN SATURATION: 99 % | DIASTOLIC BLOOD PRESSURE: 70 MMHG | WEIGHT: 178 LBS | HEART RATE: 76 BPM | BODY MASS INDEX: 34.76 KG/M2 | SYSTOLIC BLOOD PRESSURE: 112 MMHG

## 2023-12-19 DIAGNOSIS — I10 BENIGN ESSENTIAL HYPERTENSION: ICD-10-CM

## 2023-12-19 DIAGNOSIS — B96.89 ACUTE BACTERIAL SINUSITIS: Primary | ICD-10-CM

## 2023-12-19 DIAGNOSIS — J01.90 ACUTE BACTERIAL SINUSITIS: Primary | ICD-10-CM

## 2023-12-19 PROCEDURE — 99213 OFFICE O/P EST LOW 20 MIN: CPT | Performed by: NURSE PRACTITIONER

## 2023-12-19 RX ORDER — AMOXICILLIN AND CLAVULANATE POTASSIUM 875; 125 MG/1; MG/1
1 TABLET, FILM COATED ORAL EVERY 12 HOURS SCHEDULED
Qty: 14 TABLET | Refills: 0 | Status: SHIPPED | OUTPATIENT
Start: 2023-12-19 | End: 2023-12-26

## 2023-12-19 NOTE — PROGRESS NOTES
[facility]   Clary GARCIA    Assessment/Plan:      Diagnosis ICD-10-CM Associated Orders   1. Acute bacterial sinusitis  J01.90 amoxicillin-clavulanate (AUGMENTIN) 875-125 mg per tablet    B96.89       2. Benign essential hypertension  I10         Augmentin as prescribed.  Continue to optimize hydration, nutrition, rest  No follow-ups on file.  Patient may call or return to office with any questions or concerns.     ______________________________________________________________________  Subjective:     Patient ID: Beronica Villlapando is a 68 y.o. female.  Beronica Villalpando  Chief Complaint   Patient presents with    Sinusitis       Here with c/o sinus pressure/pain with HA for over a week.  Reports rhinorrhea with purulent drainage.  Recurrent, happened last year as well.  Notes she got a real tree last year and this year prior to symptom onset.  Using tylenol and Flonase without relief.      Has taken augmentin/PCN in past without s/e               The following portions of the patient's history were reviewed and updated as appropriate: allergies, current medications, past family history, past medical history, past social history, past surgical history, and problem list.    Review of Systems   HENT:  Positive for congestion, rhinorrhea, sinus pressure and sinus pain.    Neurological:  Positive for headaches.         Objective:      Vitals:    12/19/23 1615   BP: 112/70   Pulse: 76   Temp: 97.9 °F (36.6 °C)   SpO2: 99%      Physical Exam  Vitals and nursing note reviewed.   Constitutional:       General: She is awake.      Appearance: Normal appearance. She is obese.   HENT:      Head: Normocephalic and atraumatic.      Right Ear: Tympanic membrane, ear canal and external ear normal.      Left Ear: Tympanic membrane, ear canal and external ear normal.      Nose: Congestion and rhinorrhea present. Rhinorrhea is purulent.      Right Sinus: Maxillary sinus tenderness and frontal sinus tenderness  "present.      Left Sinus: Maxillary sinus tenderness and frontal sinus tenderness present.      Mouth/Throat:      Mouth: Mucous membranes are moist.      Pharynx: Oropharynx is clear. Posterior oropharyngeal erythema present. No oropharyngeal exudate or uvula swelling.   Eyes:      Extraocular Movements: Extraocular movements intact.      Conjunctiva/sclera: Conjunctivae normal.      Pupils: Pupils are equal, round, and reactive to light.   Cardiovascular:      Rate and Rhythm: Normal rate and regular rhythm.      Pulses: Normal pulses.      Heart sounds: Normal heart sounds.   Pulmonary:      Effort: Pulmonary effort is normal.      Breath sounds: Normal breath sounds.   Abdominal:      General: Bowel sounds are normal.      Palpations: Abdomen is soft.   Musculoskeletal:         General: Normal range of motion.      Cervical back: Normal range of motion and neck supple.   Skin:     General: Skin is warm and dry.   Neurological:      General: No focal deficit present.      Mental Status: She is alert and oriented to person, place, and time.   Psychiatric:         Mood and Affect: Mood normal.         Behavior: Behavior normal. Behavior is cooperative.         Thought Content: Thought content normal.         Judgment: Judgment normal.           Portions of the record may have been created with voice recognition software. Occasional wrong word or \"sound alike\" substitutions may have occurred due to the inherent limitations of voice recognition software. Please review the chart carefully and recognize, using context, where substitutions/typographical errors may have occurred.     "

## 2024-01-08 ENCOUNTER — EVALUATION (OUTPATIENT)
Dept: PHYSICAL THERAPY | Facility: CLINIC | Age: 69
End: 2024-01-08
Payer: MEDICARE

## 2024-01-08 DIAGNOSIS — G89.29 CHRONIC PAIN OF RIGHT KNEE: ICD-10-CM

## 2024-01-08 DIAGNOSIS — M25.561 CHRONIC PAIN OF RIGHT KNEE: ICD-10-CM

## 2024-01-08 DIAGNOSIS — Z01.818 PRE-OP TESTING: Primary | ICD-10-CM

## 2024-01-08 PROCEDURE — 97140 MANUAL THERAPY 1/> REGIONS: CPT | Performed by: PHYSICAL THERAPIST

## 2024-01-08 PROCEDURE — 97161 PT EVAL LOW COMPLEX 20 MIN: CPT | Performed by: PHYSICAL THERAPIST

## 2024-01-08 NOTE — PROGRESS NOTES
PT Evaluation     Today's date: 2024  Patient name: Beronica Villalpando  : 1955  MRN: [id number]  Referring provider: Kai Lombardo MD  Dx:   Encounter Diagnosis     ICD-10-CM    1. Pre-op testing  Z01.818       2. Chronic pain of right knee  M25.561     G89.29           Start Time: 1030          Assessment  Assessment details: Beronica Villalpando is a 68 y.o. female presenting to outpatient physical therapy at [facility] with complaints of R knee pain, TKA pre-op testing.  They present with decreased range of motion, decreased strength, limited flexibility, poor postural awareness, poor body mechanics, poor balance, decreased tolerance to activity and decreased functional mobility due to Pre-op testing  (primary encounter diagnosis)  Chronic pain of right knee.  They would benefit from skilled physical therapy to address noted impairments in order to allow for full functional return to work and ADL-related activities. Thank you for the referral!  Impairments: abnormal muscle firing, abnormal or restricted ROM, activity intolerance, impaired balance, impaired physical strength, lacks appropriate home exercise program, pain with function and poor body mechanics  Barriers to therapy: n/a  Understanding of Dx/Px/POC: excellent  Goals  ST.  Independent with HEP - met    Plan  Plan details: D/c to HEP, f/u post-operatively  Patient would benefit from: skilled physical therapy  Planned modality interventions: cryotherapy, electrical stimulation/Russian stimulation and thermotherapy: hydrocollator packs  Planned therapy interventions: abdominal trunk stabilization, manual therapy, neuromuscular re-education, therapeutic activities, therapeutic exercise, body mechanics training and home exercise program  Treatment plan discussed with: patient      Subjective Evaluation    History of Present Illness  Mechanism of injury: Pt presents to PT for pre-operative testing for R TKA scheduled for 24 by Dr. Lombardo  secondary to R knee OA.    Pain is located deep anterior knee with occasional posterior knee pain. Pt reports she has 10/10 R knee pain while walking. Her pain at rest is minimal. Initiating movement after prolonged sitting is painful. Eased with rest, tramadol PRN. Positive for occasional LLE N/T. Denies knee weakness, falls, instability, sleep disturbance.    Pt lives in an in-law suite connected to her children's home. She spends her time with her grandson. She also enjoys shopping in stores. No stairs in the house or to enter. She is independent with ADLs and driving. She does have a h/o L TKA done about 10 years ago. She has a walker and cane, though has not used them recently. Several years ago she was an avid walker and would like to get back to this.  Patient Goals  Patient goals for therapy: decreased edema, decreased pain, improved balance, increased motion, return to work, return to sport/leisure activities, independence with ADLs/IADLs and increased strength        Objective     Observations     Additional Observation Details  Mild edema noted bilat knees    Tenderness     Additional Tenderness Details  TTP bilat adductor, gastroc, R medial HS, R medial knee joint line    SLR to 90+ deg bilat; HS length fair    Neurological Testing     Sensation     Knee   Left Knee   Intact: Light touch    Right Knee   Intact: light touch     Reflexes   Left   Patellar (L4): trace (1+)  Achilles (S1): trace (1+)  Clonus sign: negative    Right   Patellar (L4): trace (1+)  Achilles (S1): trace (1+)  Clonus sign: negative    Active Range of Motion   Left Knee   Flexion: 80 degrees   Extension: 6 degrees     Right Knee   Flexion: 89 degrees   Extension: 0 degrees     Strength/Myotome Testing     Left Hip   Planes of Motion   Flexion: 5  Extension: 4+  Abduction: 4+  Adduction: 5  External rotation: 4+  Internal rotation: 4+    Right Hip   Planes of Motion   Flexion: 5  Extension: 4+  Abduction: 4+  Adduction: 5  External  "rotation: 4+  Internal rotation: 4+    Left Knee   Flexion: 4+  Extension: 4+    Right Knee   Flexion: 4+  Extension: 4+    Left Ankle/Foot   Dorsiflexion: 5    Right Ankle/Foot   Dorsiflexion: 4+    Functional Assessment        Comments  Gait: antalgic  STS: antalgic             Precautions: pre-op R TKA      HEP:  Access Code: [email or web address]  URL: [email or web address]  Date: 01/08/2024  Prepared by: Lisa Dumont    Exercises  - Side Lunge Adductor Stretch  - 10 reps - 5 sec hold  - Gastroc Stretch on Wall  - 1 min hold  - Standing Heel Raise with Support  - 20 reps      Manuals 1/8            R adductor STM MOSES                                                   Neuro Re-Ed                                                                                                        Ther Ex             Adductor stretch in standing 5\"x10 ea            Calf stretch Slant 90\"            HR x20                                                                             Ther Activity                                       Gait Training                                       Modalities                                            "

## 2024-01-08 NOTE — LETTER
2024    Kai Lombardo MD  [address] [facility] [address]    Patient: Beronica Villalpando   YOB: 1955   Date of Visit: 2024     Encounter Diagnosis     ICD-10-CM    1. Pre-op testing  Z01.818       2. Chronic pain of right knee  M25.561     G89.29           Dear Dr. Lombardo:    Thank you for your recent referral of Beronica Villalpando. Please review the attached evaluation summary from Beronica's recent visit.     Please verify that you agree with the plan of care by signing the attached order.     If you have any questions or concerns, please do not hesitate to call.     I sincerely appreciate the opportunity to share in the care of one of your patients and hope to have another opportunity to work with you in the near future.       Sincerely,    Lisa Dumont, PT      Referring Provider:      I certify that I have read the below Plan of Care and certify the need for these services furnished under this plan of treatment while under my care.                    Kai Lombardo MD  [address] [facility] [address]  Via Fax: [phone]          PT Evaluation     Today's date: 2024  Patient name: Beronica Villalpando  : 1955  MRN: [id number]  Referring provider: Kai Lombardo MD  Dx:   Encounter Diagnosis     ICD-10-CM    1. Pre-op testing  Z01.818       2. Chronic pain of right knee  M25.561     G89.29           Start Time: 1030          Assessment  Assessment details: Beronica Villalpando is a 68 y.o. female presenting to outpatient physical therapy at [facility] with complaints of R knee pain, TKA pre-op testing.  They present with decreased range of motion, decreased strength, limited flexibility, poor postural awareness, poor body mechanics, poor balance, decreased tolerance to activity and decreased functional mobility due to Pre-op testing  (primary encounter diagnosis)  Chronic pain of right knee.  They would benefit from skilled physical therapy to address noted  impairments in order to allow for full functional return to work and ADL-related activities. Thank you for the referral!  Impairments: abnormal muscle firing, abnormal or restricted ROM, activity intolerance, impaired balance, impaired physical strength, lacks appropriate home exercise program, pain with function and poor body mechanics  Barriers to therapy: n/a  Understanding of Dx/Px/POC: excellent  Goals  ST.  Independent with HEP - met    Plan  Plan details: D/c to HEP, f/u post-operatively  Patient would benefit from: skilled physical therapy  Planned modality interventions: cryotherapy, electrical stimulation/Russian stimulation and thermotherapy: hydrocollator packs  Planned therapy interventions: abdominal trunk stabilization, manual therapy, neuromuscular re-education, therapeutic activities, therapeutic exercise, body mechanics training and home exercise program  Treatment plan discussed with: patient      Subjective Evaluation    History of Present Illness  Mechanism of injury: Pt presents to PT for pre-operative testing for R TKA scheduled for 24 by Dr. Lombardo secondary to R knee OA.    Pain is located deep anterior knee with occasional posterior knee pain. Pt reports she has 10/10 R knee pain while walking. Her pain at rest is minimal. Initiating movement after prolonged sitting is painful. Eased with rest, tramadol PRN. Positive for occasional LLE N/T. Denies knee weakness, falls, instability, sleep disturbance.    Pt lives in an in-law suite connected to her children's home. She spends her time with her grandson. She also enjoys shopping in stores. No stairs in the house or to enter. She is independent with ADLs and driving. She does have a h/o L TKA done about 10 years ago. She has a walker and cane, though has not used them recently. Several years ago she was an avid walker and would like to get back to this.  Patient Goals  Patient goals for therapy: decreased edema, decreased pain,  improved balance, increased motion, return to work, return to sport/leisure activities, independence with ADLs/IADLs and increased strength        Objective     Observations     Additional Observation Details  Mild edema noted bilat knees    Tenderness     Additional Tenderness Details  TTP bilat adductor, gastroc, R medial HS, R medial knee joint line    SLR to 90+ deg bilat; HS length fair    Neurological Testing     Sensation     Knee   Left Knee   Intact: Light touch    Right Knee   Intact: light touch     Reflexes   Left   Patellar (L4): trace (1+)  Achilles (S1): trace (1+)  Clonus sign: negative    Right   Patellar (L4): trace (1+)  Achilles (S1): trace (1+)  Clonus sign: negative    Active Range of Motion   Left Knee   Flexion: 80 degrees   Extension: 6 degrees     Right Knee   Flexion: 89 degrees   Extension: 0 degrees     Strength/Myotome Testing     Left Hip   Planes of Motion   Flexion: 5  Extension: 4+  Abduction: 4+  Adduction: 5  External rotation: 4+  Internal rotation: 4+    Right Hip   Planes of Motion   Flexion: 5  Extension: 4+  Abduction: 4+  Adduction: 5  External rotation: 4+  Internal rotation: 4+    Left Knee   Flexion: 4+  Extension: 4+    Right Knee   Flexion: 4+  Extension: 4+    Left Ankle/Foot   Dorsiflexion: 5    Right Ankle/Foot   Dorsiflexion: 4+    Functional Assessment        Comments  Gait: antalgic  STS: antalgic             Precautions: pre-op R TKA      HEP:  Access Code: [email or web address]  URL: [email or web address]  Date: 01/08/2024  Prepared by: Lisa Dumont    Exercises  - Side Lunge Adductor Stretch  - 10 reps - 5 sec hold  - Gastroc Stretch on Wall  - 1 min hold  - Standing Heel Raise with Support  - 20 reps      Manuals 1/8            R adductor STM MOSES                                                   Neuro Re-Ed                                                                                                        Ther Ex             Adductor stretch in standing  "5\"x10 ea            Calf stretch Slant 90\"            HR x20                                                                             Ther Activity                                       Gait Training                                       Modalities                                                          "

## 2024-01-12 ENCOUNTER — RA CDI HCC (OUTPATIENT)
Dept: OTHER | Facility: HOSPITAL | Age: 69
End: 2024-01-12

## 2024-01-12 DIAGNOSIS — E11.9 TYPE 2 DIABETES MELLITUS WITHOUT COMPLICATION, WITHOUT LONG-TERM CURRENT USE OF INSULIN (HCC): ICD-10-CM

## 2024-01-12 DIAGNOSIS — I10 BENIGN ESSENTIAL HYPERTENSION: ICD-10-CM

## 2024-01-12 DIAGNOSIS — E03.9 ACQUIRED HYPOTHYROIDISM: ICD-10-CM

## 2024-01-12 RX ORDER — AMLODIPINE BESYLATE 5 MG/1
TABLET ORAL
Qty: 90 TABLET | Refills: 3 | Status: SHIPPED | OUTPATIENT
Start: 2024-01-12

## 2024-01-12 RX ORDER — LEVOTHYROXINE SODIUM 0.1 MG/1
TABLET ORAL
Qty: 90 TABLET | Refills: 3 | Status: SHIPPED | OUTPATIENT
Start: 2024-01-12

## 2024-01-12 RX ORDER — VALSARTAN AND HYDROCHLOROTHIAZIDE 160; 25 MG/1; MG/1
TABLET ORAL
Qty: 90 TABLET | Refills: 3 | Status: SHIPPED | OUTPATIENT
Start: 2024-01-12

## 2024-01-12 RX ORDER — SIMVASTATIN 20 MG
TABLET ORAL
Qty: 90 TABLET | Refills: 3 | Status: SHIPPED | OUTPATIENT
Start: 2024-01-12

## 2024-01-12 NOTE — PROGRESS NOTES
E11.51  Z79.4  HCC coding opportunities          Chart Reviewed number of suggestions sent to Provider: 2     Patients Insurance     Medicare Insurance: Medicare

## 2024-01-16 ENCOUNTER — OFFICE VISIT (OUTPATIENT)
Dept: FAMILY MEDICINE CLINIC | Facility: HOSPITAL | Age: 69
End: 2024-01-16
Payer: MEDICARE

## 2024-01-16 VITALS
WEIGHT: 182 LBS | DIASTOLIC BLOOD PRESSURE: 78 MMHG | HEART RATE: 58 BPM | BODY MASS INDEX: 35.54 KG/M2 | OXYGEN SATURATION: 98 % | SYSTOLIC BLOOD PRESSURE: 130 MMHG

## 2024-01-16 DIAGNOSIS — J31.0 CHRONIC RHINITIS: ICD-10-CM

## 2024-01-16 DIAGNOSIS — M17.11 PRIMARY OSTEOARTHRITIS OF RIGHT KNEE: ICD-10-CM

## 2024-01-16 DIAGNOSIS — F11.20 CONTINUOUS OPIOID DEPENDENCE (HCC): ICD-10-CM

## 2024-01-16 DIAGNOSIS — E11.3293 MILD NONPROLIFERATIVE DIABETIC RETINOPATHY OF BOTH EYES WITHOUT MACULAR EDEMA ASSOCIATED WITH TYPE 2 DIABETES MELLITUS (HCC): ICD-10-CM

## 2024-01-16 DIAGNOSIS — I10 BENIGN ESSENTIAL HYPERTENSION: ICD-10-CM

## 2024-01-16 DIAGNOSIS — I73.9 PAD (PERIPHERAL ARTERY DISEASE) (HCC): ICD-10-CM

## 2024-01-16 DIAGNOSIS — F33.9 DEPRESSION, RECURRENT (HCC): ICD-10-CM

## 2024-01-16 DIAGNOSIS — K21.9 GASTROESOPHAGEAL REFLUX DISEASE WITHOUT ESOPHAGITIS: ICD-10-CM

## 2024-01-16 DIAGNOSIS — E66.01 OBESITY, MORBID (HCC): ICD-10-CM

## 2024-01-16 DIAGNOSIS — E11.9 TYPE 2 DIABETES MELLITUS WITHOUT COMPLICATION, WITHOUT LONG-TERM CURRENT USE OF INSULIN (HCC): ICD-10-CM

## 2024-01-16 DIAGNOSIS — J30.81 ALLERGIC RHINITIS DUE TO ANIMAL (CAT) (DOG) HAIR AND DANDER: ICD-10-CM

## 2024-01-16 DIAGNOSIS — Z01.810 PREOP CARDIOVASCULAR EXAM: Primary | ICD-10-CM

## 2024-01-16 DIAGNOSIS — M35.00 H/O SJOGREN'S DISEASE (HCC): ICD-10-CM

## 2024-01-16 DIAGNOSIS — E03.9 ACQUIRED HYPOTHYROIDISM: ICD-10-CM

## 2024-01-16 DIAGNOSIS — R09.81 SINUS CONGESTION: ICD-10-CM

## 2024-01-16 PROCEDURE — 93000 ELECTROCARDIOGRAM COMPLETE: CPT | Performed by: STUDENT IN AN ORGANIZED HEALTH CARE EDUCATION/TRAINING PROGRAM

## 2024-01-16 PROCEDURE — 99214 OFFICE O/P EST MOD 30 MIN: CPT | Performed by: STUDENT IN AN ORGANIZED HEALTH CARE EDUCATION/TRAINING PROGRAM

## 2024-01-16 NOTE — PROGRESS NOTES
[facility] PRE-OPERATIVE EVALUATION  [facility] - [facility] PRIMARY CARE [address]  NAME: Beronica Villalpando  AGE: 68 y.o. SEX: female  : 1955   DATE: 2024     History of Present Illness:     Beronica Villalpando is a 68 y.o. female who presents to the office today for a preoperative consultation at the request of surgeon, Kai Lombardo MD, who plans on performing R TKR robotic assisted on 24. Planned anesthesia is regional and general. Patient has a bleeding risk of: no recent abnormal bleeding and no remote history of abnormal bleeding. Patient does not have objections to receiving blood products if needed. Current anti-platelet/anti-coagulation medications that the patient is prescribed includes: Aspirin, Diclofenac when used.      Assessment of Chronic Conditions:   - Diabetes Mellitus: lantus in evening, some mealtime sliding scale humalog   - Hypertension: controlled on diovan HCT, lopressor & norvasc   - Hypothyroidism - on levothyroxine 100 mcg   - Knee pain - tramadol rare use/diclofenac          - PDMP reviewed.      Assessment of Cardiac Risk:  Denies unstable or severe angina or MI in the last 6 weeks or history of stent placement in the last year   Denies decompensated heart failure (e.g. New onset heart failure, NYHA functional class IV heart failure, or worsening existing heart failure)  Denies significant arrhythmias such as high grade AV block, symptomatic ventricular arrhythmia, newly recognized ventricular tachycardia, supraventricular tachycardia with resting heart rate >100, or symptomatic bradycardia  Denies severe heart valve disease including aortic stenosis or symptomatic mitral stenosis     Exercise Capacity:  Able to walk 4 blocks without symptoms?: Yes  Able to walk 2 flights without symptoms?: Yes    Prior Anesthesia Reactions: No  Personal history of venous thromboembolic disease? No  History of steroid use for >2 weeks within last year?  No    Depression Screening and Follow-up Plan: Patient was screened for depression during today's encounter. They screened negative with a PHQ-9 score of 4.      Review of Systems:     Review of Systems   Constitutional:  Negative for chills and fever.   HENT:  Positive for congestion (thinks she was allergic to her Xmas tree, maybe the dog. Stuffy and never going away).    Respiratory:  Negative for cough and shortness of breath.    Cardiovascular:  Negative for chest pain and palpitations.   Neurological:  Negative for dizziness, light-headedness and headaches.     Current Problem List:     Patient Active Problem List   Diagnosis    Anxiety    Benign essential hypertension    Acquired hypothyroidism    Type 2 diabetes mellitus without complication, without long-term current use of insulin ([facility])    Lumbar stenosis with neurogenic claudication    Sjogrens syndrome ([facility])    Thyromegaly    Osteoarthritis of right knee    Pure hypercholesterolemia    Primary osteoarthritis of right knee    Gastroesophageal reflux disease without esophagitis    PAD (peripheral artery disease) ([facility])    Grief reaction    Continuous opioid dependence ([facility])    Depression, recurrent ([facility])    Mild nonproliferative diabetic retinopathy of both eyes without macular edema associated with type 2 diabetes mellitus ([facility])    Left wrist pain    Obesity, morbid ([facility])    Osteopenia of hip     Allergies:     Allergies   Allergen Reactions    Niacin Anaphylaxis     [facility] - [id number]: [place]    Ciprofloxacin     Pioglitazone Hives    Cefprozil Rash    Cefuroxime Rash    Ramipril Rash       Current Medications:       Current Outpatient Medications:     amLODIPine (NORVASC) 5 mg tablet, TAKE 1 TABLET DAILY, Disp: 90 tablet, Rfl: 3    aspirin 81 MG tablet, Take 1 tablet by mouth daily, Disp: , Rfl:     calcium carbonate-vitamin D 500 mg-5 mcg per tablet, Take 1 tablet by mouth 2 (two) times a day with meals, Disp: 180 tablet, Rfl: 3    diclofenac  "potassium (CATAFLAM) 50 mg tablet, Take 1 tablet (50 mg total) by mouth 3 (three) times a day, Disp: 270 tablet, Rfl: 3    fluticasone (FLONASE) 50 mcg/act nasal spray, 1 spray into each nostril daily, Disp: 11.1 mL, Rfl: 0    glucose blood (OneTouch Verio) test strip, USE TO TEST BLOOD SUGAR FOUR TIMES A DAY, Disp: 150 strip, Rfl: 3    insulin lispro (HumaLOG KwikPen) 100 units/mL injection pen, INJECT 20 UNITS UNDER THE SKIN THREE TIMES A DAY WITH MEALS PER SLIDING SCALE, Disp: 60 mL, Rfl: 2    Insulin Pen Needle 31G X 8 MM MISC, USE 3 PEN NEEDLES DAILY, Disp: 100 each, Rfl: 5    Insulin Syringe-Needle U-100 31G X 5/16\" 0.3 ML MISC, Use 4 (four) times a day, Disp: 100 each, Rfl: 0    Lantus SoloStar 100 units/mL SOPN, INJECT 30 UNITS AT BEDTIME - PT would like a 90 day supply, Disp: 45 mL, Rfl: 3    levothyroxine 100 mcg tablet, TAKE 1 TABLET DAILY, Disp: 90 tablet, Rfl: 3    metoprolol tartrate (LOPRESSOR) 50 mg tablet, TAKE 1 TABLET TWICE A DAY, Disp: 180 tablet, Rfl: 3    omeprazole (PriLOSEC) 20 mg delayed release capsule, Take 1 capsule (20 mg total) by mouth daily, Disp: 90 capsule, Rfl: 3    OneTouch Delica Lancets 33G MISC, Testing 4 times daily, Disp: 200 each, Rfl: 5    simvastatin (ZOCOR) 20 mg tablet, TAKE 1 TABLET DAILY, Disp: 90 tablet, Rfl: 3    traMADol (ULTRAM) 50 mg tablet, Take 1 tablet (50 mg total) by mouth 2 (two) times a day as needed for moderate pain, Disp: 60 tablet, Rfl: 3    valsartan-hydrochlorothiazide (DIOVAN-HCT) 160-25 MG per tablet, TAKE 1 TABLET DAILY, Disp: 90 tablet, Rfl: 3    Past Medical History:       Past Medical History:   Diagnosis Date    Anxiety     Diabetes mellitus (HCC)     GERD (gastroesophageal reflux disease)     History of total left knee replacement (TKR) 2013    Hyperlipidemia     Hypertension     Lumbar stenosis     Obesity     Sjogren's syndrome (HCC)     TMJ (dislocation of temporomandibular joint)     Trigeminal neuralgia         Past Surgical History: "   Procedure Laterality Date    APPENDECTOMY      BREAST BIOPSY Right 2018    Benign    CARDIAC CATHETERIZATION       SECTION  78, 84, 85, 89    COLONOSCOPY      HYSTERECTOMY      JOINT REPLACEMENT      KNEE SURGERY      LYMPH NODE BIOPSY      OOPHORECTOMY Bilateral     TONSILLECTOMY      US GUIDED BREAST BIOPSY RIGHT COMPLETE Right 2018        Family History   Problem Relation Age of Onset    Colon cancer Mother         60's    Dementia Father     Depression Father     Diabetes Father     Stroke Father     Parkinsonism Father     No Known Problems Sister     No Known Problems Sister     No Known Problems Sister     No Known Problems Maternal Grandmother     No Known Problems Maternal Grandfather     No Known Problems Paternal Grandmother     No Known Problems Paternal Grandfather     No Known Problems Maternal Aunt     No Known Problems Maternal Aunt     No Known Problems Maternal Aunt     No Known Problems Maternal Aunt     No Known Problems Maternal Aunt     Breast cancer Paternal Aunt         50's    No Known Problems Paternal Aunt     No Known Problems Paternal Aunt     No Known Problems Paternal Aunt     GI problems Family         GI malignancy    Endometrial cancer Neg Hx     Ovarian cancer Neg Hx         Social History     Socioeconomic History    Marital status:      Spouse name: Not on file    Number of children: Not on file    Years of education: Not on file    Highest education level: Not on file   Occupational History    Not on file   Tobacco Use    Smoking status: Never    Smokeless tobacco: Never   Vaping Use    Vaping status: Never Used   Substance and Sexual Activity    Alcohol use: No    Drug use: No    Sexual activity: Not Currently   Other Topics Concern    Not on file   Social History Narrative    Caffeine use: 1-2 times per week    Dental care, regularly    Living situation: supportive and safe    No advance directive on file         Social Determinants of Health      Financial Resource Strain: Low Risk  (8/25/2022)    Overall Financial Resource Strain (CARDIA)     Difficulty of Paying Living Expenses: Not hard at all   Food Insecurity: Not on file   Transportation Needs: No Transportation Needs (8/25/2022)    PRAPARE - Transportation     Lack of Transportation (Medical): No     Lack of Transportation (Non-Medical): No   Physical Activity: Not on file   Stress: Not on file   Social Connections: Not on file   Intimate Partner Violence: Not on file   Housing Stability: Not on file        Physical Exam:     /78   Pulse 58   Wt 82.6 kg (182 lb)   LMP  (LMP Unknown)   SpO2 98%   BMI 35.54 kg/m²     Physical Exam  Vitals and nursing note reviewed.   Constitutional:       General: She is not in acute distress.     Appearance: Normal appearance. She is well-developed. She is obese. She is not ill-appearing.   HENT:      Head: Normocephalic and atraumatic.   Eyes:      General: No scleral icterus.        Right eye: No discharge.         Left eye: No discharge.   Cardiovascular:      Rate and Rhythm: Normal rate and regular rhythm.      Pulses: Normal pulses.      Heart sounds: Normal heart sounds. No murmur heard.  Pulmonary:      Effort: Pulmonary effort is normal. No respiratory distress.      Breath sounds: Normal breath sounds. No stridor. No wheezing.   Musculoskeletal:         General: Tenderness (R knee pain) present.      Cervical back: Normal range of motion and neck supple. No rigidity.      Right lower leg: No edema.      Left lower leg: No edema.   Skin:     General: Skin is warm.   Neurological:      Mental Status: She is alert and oriented to person, place, and time.      Gait: Gait abnormal (limp).   Psychiatric:         Mood and Affect: Mood normal.         Behavior: Behavior normal.         Thought Content: Thought content normal.         Judgment: Judgment normal.       Data:   Pre-operative work-up  Laboratory Results:  Pending     EKG: I have personally  reviewed pertinent reports.    1/16/2024 - NSR, 58 bpm, slightly bradycardic on lopressor      Assessment & Recommendations:     1. Preop cardiovascular exam  CBC and differential    POCT ECG      2. Primary osteoarthritis of right knee        3. Benign essential hypertension        4. Type 2 diabetes mellitus without complication, without long-term current use of insulin ([facility])  Hemoglobin A1C    Comprehensive metabolic panel      5. Acquired hypothyroidism  TSH, 3rd generation      6. Gastroesophageal reflux disease without esophagitis        7. H/O Sjogren's disease ([facility])        8. Continuous opioid dependence ([facility])        9. Depression, recurrent ([facility])        10. Mild nonproliferative diabetic retinopathy of both eyes without macular edema associated with type 2 diabetes mellitus ([facility])        11. PAD (peripheral artery disease) ([facility])        12. Obesity, morbid ([facility])        13. Sinus congestion  [facility] Allergy Panel, Adult      14. Allergic rhinitis due to animal (cat) (dog) hair and dander  [facility] Allergy Panel, Adult      15. Chronic rhinitis  [facility] Allergy Panel, Adult        Pre-Op Evaluation Assessment  68 y.o. female with planned surgery: R total Knee Replacement   Known risk factors for perioperative complications: Diabetes mellitus.    Current meds which may produce withdrawal symptoms if withheld perioperatively: Tramadol.    Pre-Op Evaluation Plan  1. Further preoperative workup as follows:   - Complete blood count  - Basic metabolic profile    2. Medication Management/Recommendations:   - None, continue medication regimen including morning of surgery, with sip of water  - Patient has been instructed to avoid herbs or non-directed vitamins the week prior to surgery to ensure no drug interactions with perioperative surgical and anesthetic medications.   - Patient should continue antihypertensive medications up through and including the day of surgery.   - Patient should continue beta-blocker  medication up through and including the day of surgery.  - Patient has been instructed to avoid aspirin containing medications or non-steroidal anti-inflammatory drugs for the week preceding surgery.  HOLD ASA 3d prior, & cataflam as well for 3d.   No tramadol 3d prior.     3. Prophylaxis for cardiac events with perioperative beta-blockers: not indicated.    4. Patient requires further consultation with: None    Clearance  Patient is CLEARED for surgery without any additional cardiac testing.     Clare Munroe DO  [facility] [address] [place] [id number]  Phone#  [phone]  Fax#  [phone]    Xena Lawson

## 2024-01-18 ENCOUNTER — APPOINTMENT (OUTPATIENT)
Dept: LAB | Facility: HOSPITAL | Age: 69
End: 2024-01-18
Payer: MEDICARE

## 2024-01-18 DIAGNOSIS — R09.81 SINUS CONGESTION: ICD-10-CM

## 2024-01-18 DIAGNOSIS — J30.81 ALLERGIC RHINITIS DUE TO ANIMAL (CAT) (DOG) HAIR AND DANDER: ICD-10-CM

## 2024-01-18 DIAGNOSIS — E11.9 TYPE 2 DIABETES MELLITUS WITHOUT COMPLICATION, WITHOUT LONG-TERM CURRENT USE OF INSULIN (HCC): ICD-10-CM

## 2024-01-18 DIAGNOSIS — Z01.810 PREOP CARDIOVASCULAR EXAM: ICD-10-CM

## 2024-01-18 DIAGNOSIS — J31.0 CHRONIC RHINITIS: ICD-10-CM

## 2024-01-18 DIAGNOSIS — E03.9 ACQUIRED HYPOTHYROIDISM: ICD-10-CM

## 2024-01-18 LAB
ALBUMIN SERPL BCP-MCNC: 4.4 G/DL (ref 3.5–5)
ALP SERPL-CCNC: 125 U/L (ref 34–104)
ALT SERPL W P-5'-P-CCNC: 10 U/L (ref 7–52)
ANION GAP SERPL CALCULATED.3IONS-SCNC: 9 MMOL/L
AST SERPL W P-5'-P-CCNC: 15 U/L (ref 13–39)
BASOPHILS # BLD AUTO: 0.05 THOUSANDS/ÂΜL (ref 0–0.1)
BASOPHILS NFR BLD AUTO: 0 % (ref 0–1)
BILIRUB SERPL-MCNC: 0.65 MG/DL (ref 0.2–1)
BUN SERPL-MCNC: 15 MG/DL (ref 5–25)
CALCIUM SERPL-MCNC: 9.8 MG/DL (ref 8.4–10.2)
CHLORIDE SERPL-SCNC: 100 MMOL/L (ref 96–108)
CO2 SERPL-SCNC: 32 MMOL/L (ref 21–32)
CREAT SERPL-MCNC: 0.85 MG/DL (ref 0.6–1.3)
EOSINOPHIL # BLD AUTO: 0.52 THOUSAND/ÂΜL (ref 0–0.61)
EOSINOPHIL NFR BLD AUTO: 4 % (ref 0–6)
ERYTHROCYTE [DISTWIDTH] IN BLOOD BY AUTOMATED COUNT: 13.2 % (ref 11.6–15.1)
EST. AVERAGE GLUCOSE BLD GHB EST-MCNC: 146 MG/DL
GFR SERPL CREATININE-BSD FRML MDRD: 71 ML/MIN/1.73SQ M
GLUCOSE P FAST SERPL-MCNC: 106 MG/DL (ref 65–99)
HBA1C MFR BLD: 6.7 %
HCT VFR BLD AUTO: 42 % (ref 34.8–46.1)
HGB BLD-MCNC: 13.3 G/DL (ref 11.5–15.4)
IMM GRANULOCYTES # BLD AUTO: 0.06 THOUSAND/UL (ref 0–0.2)
IMM GRANULOCYTES NFR BLD AUTO: 0 % (ref 0–2)
LYMPHOCYTES # BLD AUTO: 3.59 THOUSANDS/ÂΜL (ref 0.6–4.47)
LYMPHOCYTES NFR BLD AUTO: 25 % (ref 14–44)
MCH RBC QN AUTO: 28.1 PG (ref 26.8–34.3)
MCHC RBC AUTO-ENTMCNC: 31.7 G/DL (ref 31.4–37.4)
MCV RBC AUTO: 89 FL (ref 82–98)
MONOCYTES # BLD AUTO: 0.88 THOUSAND/ÂΜL (ref 0.17–1.22)
MONOCYTES NFR BLD AUTO: 6 % (ref 4–12)
NEUTROPHILS # BLD AUTO: 9.56 THOUSANDS/ÂΜL (ref 1.85–7.62)
NEUTS SEG NFR BLD AUTO: 65 % (ref 43–75)
NRBC BLD AUTO-RTO: 0 /100 WBCS
PLATELET # BLD AUTO: 306 THOUSANDS/UL (ref 149–390)
PMV BLD AUTO: 11.2 FL (ref 8.9–12.7)
POTASSIUM SERPL-SCNC: 4 MMOL/L (ref 3.5–5.3)
PROT SERPL-MCNC: 7.5 G/DL (ref 6.4–8.4)
RBC # BLD AUTO: 4.73 MILLION/UL (ref 3.81–5.12)
SODIUM SERPL-SCNC: 141 MMOL/L (ref 135–147)
TSH SERPL DL<=0.05 MIU/L-ACNC: 0.05 UIU/ML (ref 0.45–4.5)
WBC # BLD AUTO: 14.66 THOUSAND/UL (ref 4.31–10.16)

## 2024-01-18 PROCEDURE — 84443 ASSAY THYROID STIM HORMONE: CPT

## 2024-01-18 PROCEDURE — 83036 HEMOGLOBIN GLYCOSYLATED A1C: CPT

## 2024-01-18 PROCEDURE — 86003 ALLG SPEC IGE CRUDE XTRC EA: CPT

## 2024-01-18 PROCEDURE — 85025 COMPLETE CBC W/AUTO DIFF WBC: CPT

## 2024-01-18 PROCEDURE — 82785 ASSAY OF IGE: CPT

## 2024-01-18 PROCEDURE — 80053 COMPREHEN METABOLIC PANEL: CPT

## 2024-01-18 PROCEDURE — 36415 COLL VENOUS BLD VENIPUNCTURE: CPT

## 2024-01-22 ENCOUNTER — TELEPHONE (OUTPATIENT)
Dept: FAMILY MEDICINE CLINIC | Facility: HOSPITAL | Age: 69
End: 2024-01-22

## 2024-01-22 NOTE — TELEPHONE ENCOUNTER
Patient seen last week for pre-op clearance    Note states patient is cleared, but not sure if you have a form from the patient as well?    Pls fax form (if there is one), office note, EKG, to [facility] @ [phone].

## 2024-01-23 ENCOUNTER — TELEPHONE (OUTPATIENT)
Dept: FAMILY MEDICINE CLINIC | Facility: HOSPITAL | Age: 69
End: 2024-01-23

## 2024-01-23 LAB

## 2024-01-23 NOTE — TELEPHONE ENCOUNTER
Preop people called and they need Beronica   Office note, EKG and labs faxed to [phone]. Did not have a form or anything she said the office note was good enough,     Please fax to [phone]

## 2024-02-02 ENCOUNTER — OFFICE VISIT (OUTPATIENT)
Dept: PHYSICAL THERAPY | Facility: CLINIC | Age: 69
End: 2024-02-02
Payer: MEDICARE

## 2024-02-02 DIAGNOSIS — Z96.651 TOTAL KNEE REPLACEMENT STATUS, RIGHT: Primary | ICD-10-CM

## 2024-02-02 PROCEDURE — 97140 MANUAL THERAPY 1/> REGIONS: CPT | Performed by: PHYSICAL THERAPIST

## 2024-02-02 PROCEDURE — 97161 PT EVAL LOW COMPLEX 20 MIN: CPT | Performed by: PHYSICAL THERAPIST

## 2024-02-02 NOTE — PROGRESS NOTES
PT Evaluation     Today's date: 2024  Patient name: Beronica Villalpando  : 1955  MRN: [id number]  Referring provider: Kai Lombardo MD  Dx:   Encounter Diagnosis     ICD-10-CM    1. Total knee replacement status, right  Z96.651           Start Time: 1300          Assessment  Assessment details: Problem List:  1) R knee ext and flex ROM deficits- addressing with passive and active stretching  2) R knee and hip strength deficits - addressing with neuromuscular re-education and progressive strength program  3) gait dysfunction - addressing with gait and proprioception training    Beronica Villalpando is a pleasant 68 y.o. female who presents with R knee pain s/p R TKA done by Dr. Lombardo with [facility] on 24.  she has R knee ROM restriction, R knee and hip weakness, and gait dysfunction resulting in difficulties with ADLs, unsteady gait with reliance on assistive device and decreased participation in recreational activities.  No further referral appears necessary at this time based upon examination results.  I expect she will improve moderately in 8-12 weeks of skilled PT and transition to home maintenance program.        Comparable signs:  1) walking  2) STS  Impairments: abnormal muscle firing, abnormal or restricted ROM, activity intolerance, impaired balance, impaired physical strength, lacks appropriate home exercise program, pain with function and poor body mechanics  Barriers to therapy: n/a  Understanding of Dx/Px/POC: excellent  Goals  ST.  Independent with HEP in 2 weeks  2. Decrease pain by 50% in 3 wks  3.  Increase R knee ext AROM to 0 degrees in 3 weeks     LT. Achieve FOTO score of 71/100 in 6 weeks   2.  Able to walk for household distances without AD use in 6 weeks  3.  Strength = 4+/5 R knee ext in 8 weeks  4. Able to perform STS with good mechanics, no UE assist in 8 weeks      Plan  Plan details: RE in 8 weeks.  Patient would benefit from: skilled physical  "therapy  Planned modality interventions: cryotherapy, electrical stimulation/Russian stimulation and thermotherapy: hydrocollator packs  Planned therapy interventions: abdominal trunk stabilization, manual therapy, neuromuscular re-education, therapeutic activities, therapeutic exercise, body mechanics training and home exercise program  Frequency: 2x week  Duration in weeks: 8  Plan of Care beginning date: 2/2/2024  Plan of Care expiration date: 3/29/2024  Treatment plan discussed with: patient        Subjective Evaluation    History of Present Illness  Mechanism of injury: Pt is 4 days s/p R TKA done by Dr. Lombardo at [facility] on 1/29/24.    She reports overall she is in 10/10 knee pain. Pain is managed with tylenol, tramadol and oxycodone and ice. She is ambulating with RW and reports feeling steady on her feet. She reports she is doing exercises provided by the surgeon 3x/day. Denies pain, swelling or redness of the calf/lower leg. Positive for R knee swelling, weakness. Denies N/T.    She reports she is not eating very much as she does not have an appetite and has been nauseated. She is trying to stay hydrated. She reports one instance last night of having the urge to urinate, she stood up and her bladder emptied completely without her being unable to stop the flow. This has not happened again since. She has had one bowel movement since surgery. She is not sleeping well; on avg getting 6 hrs of sleep per night and waking \"quite a bit\" due to pain.    Per pre-op PT evaluation \"Pt lives in an in-law suite connected to her children's home. She spends her time with her grandson. She also enjoys shopping in stores. No stairs in the house or to enter. She is independent with ADLs and driving. She does have a h/o L TKA done about 10 years ago. She has a walker and cane... Several years ago she was an avid walker and would like to get back to this.\"    Main concern: the high pain level  Main goal: to be able to " walk without the walker      Patient Goals  Patient goals for therapy: decreased edema, decreased pain, improved balance, increased motion, return to work, return to sport/leisure activities, independence with ADLs/IADLs and increased strength          Objective     Observations     Additional Observation Details  Knee covered in surgical dressing appears to be of appropriate healing without evidence of drainage    Tenderness     Additional Tenderness Details  TTP R quad, medial knee, adductor, ITB    Neurological Testing     Sensation     Knee     Right Knee   Intact: light touch     Active Range of Motion     Right Knee   Flexion: 42 degrees with pain  Extension: 5 degrees with pain    Passive Range of Motion     Right Knee   Flexion: 50 degrees with pain    Additional Passive Range of Motion Details  Seated flexion achieved approximately 60 deg    Strength/Myotome Testing     Left Hip   Planes of Motion   Flexion: 3+  Extension: 3+  Abduction: 3+  Adduction: 3+  External rotation: 3+  Internal rotation: 3+    Right Hip   Planes of Motion   Flexion: 3+  Extension: 3+  Abduction: 3+  Adduction: 3+  External rotation: 3+  Internal rotation: 3+    Left Knee   Quadriceps contraction: fair    Right Knee   Flexion: 4-  Extension: 4-  Quadriceps contraction: poor    Functional Assessment        Comments  Gait: antalgic, RW use, decreased gait speed, RLE toe out  STS: bilateral UE assist; uncontrolled descent into the chair             Dx: R knee pain with ROM, balance and hip/knee strength deficits  EPOC: 3/15  F/u with referrin/12  Precautions: s/p R TKA; Dr. Lombardo with [facility]; 24  Comorbidities: anxiety, DM, HTN, h/o L TKA   Main concerns: high pain level  Main goals: walk without assistive device  FOTO goal: 71/100 in   FOTO progress: 48/100 on       HEP:  Access Code: [email or web address]  URL: [email or web address]  Date: 2024  Prepared by: Lisa Dumont    Exercises  - Supine  Ankle Pumps  - 20 reps  - Seated Long Arc Quad  - 10 reps  - Supine Quad Set  - 20 reps - 5 sec hold  - Supine Heel Slide  - 10 reps  - Seated Hamstring Stretch  - 20 reps - 5 sec hold  - Seated Hip Adduction Isometrics with Ball  - 20 reps - 5 sec hold  - Seated Glute Squeezes  - 20 reps - 5 sec hold  - Standing Heel Raise with Support  - 20 reps        Manuals 2/2            R knee PROM Seated MOSES            RLE STM Calf, medialknee                                                   Neuro Re-Ed             NMES R quad             Weight shift s-s             tandem             SLS             Ankle circ board taps f-b, s-s             RB taps f-b, s-s                                       Ther Ex             Nustep - cardio, ROM             Bike - cardio, ROM             UBE - cardio, pain mod             Calf stretch             HR/TR             HS stretch             LAQ             Quad set             SLR             Heel slide             Glute set             Hip add iso             Hip abd iso                                                    Ther Activity                                       Gait Training             Heel toe in RW             SPC                          Modalities             CP

## 2024-02-02 NOTE — LETTER
2024    Kai Lombardo MD  [address] [facility] [address]    Patient: Beronica Villalpando   YOB: 1955   Date of Visit: 2024     Encounter Diagnosis     ICD-10-CM    1. Total knee replacement status, right  Z96.651           Dear Dr. Lombardo:    Thank you for your recent referral of Beronica Villalpando. Please review the attached evaluation summary from Beronica's recent visit.     Please verify that you agree with the plan of care by signing the attached order.     If you have any questions or concerns, please do not hesitate to call.     I sincerely appreciate the opportunity to share in the care of one of your patients and hope to have another opportunity to work with you in the near future.       Sincerely,    Lisa Dumont, PT      Referring Provider:      I certify that I have read the below Plan of Care and certify the need for these services furnished under this plan of treatment while under my care.                    Kai Lombardo MD  [address] [facility] [address]  Via Fax: [phone]          PT Evaluation     Today's date: 2024  Patient name: Beronica Villalpando  : 1955  MRN: [id number]  Referring provider: Kai Lombardo MD  Dx:   Encounter Diagnosis     ICD-10-CM    1. Total knee replacement status, right  Z96.651           Start Time: 1300          Assessment  Assessment details: Problem List:  1) R knee ext and flex ROM deficits- addressing with passive and active stretching  2) R knee and hip strength deficits - addressing with neuromuscular re-education and progressive strength program  3) gait dysfunction - addressing with gait and proprioception training    Beronica Villalpando is a pleasant 68 y.o. female who presents with R knee pain s/p R TKA done by Dr. Lombardo with [facility] on 24.  she has R knee ROM restriction, R knee and hip weakness, and gait dysfunction resulting in difficulties with ADLs, unsteady gait with reliance  on assistive device and decreased participation in recreational activities.  No further referral appears necessary at this time based upon examination results.  I expect she will improve moderately in 8-12 weeks of skilled PT and transition to home maintenance program.        Comparable signs:  1) walking  2) STS  Impairments: abnormal muscle firing, abnormal or restricted ROM, activity intolerance, impaired balance, impaired physical strength, lacks appropriate home exercise program, pain with function and poor body mechanics  Barriers to therapy: n/a  Understanding of Dx/Px/POC: excellent  Goals  ST.  Independent with HEP in 2 weeks  2. Decrease pain by 50% in 3 wks  3.  Increase R knee ext AROM to 0 degrees in 3 weeks     LT. Achieve FOTO score of 71/100 in 6 weeks   2.  Able to walk for household distances without AD use in 6 weeks  3.  Strength = 4+/5 R knee ext in 8 weeks  4. Able to perform STS with good mechanics, no UE assist in 8 weeks      Plan  Plan details: RE in 8 weeks.  Patient would benefit from: skilled physical therapy  Planned modality interventions: cryotherapy, electrical stimulation/Russian stimulation and thermotherapy: hydrocollator packs  Planned therapy interventions: abdominal trunk stabilization, manual therapy, neuromuscular re-education, therapeutic activities, therapeutic exercise, body mechanics training and home exercise program  Frequency: 2x week  Duration in weeks: 8  Plan of Care beginning date: 2024  Plan of Care expiration date: 3/29/2024  Treatment plan discussed with: patient        Subjective Evaluation    History of Present Illness  Mechanism of injury: Pt is 4 days s/p R TKA done by Dr. Lombardo at [facility] on 24.    She reports overall she is in 10/10 knee pain. Pain is managed with tylenol, tramadol and oxycodone and ice. She is ambulating with RW and reports feeling steady on her feet. She reports she is doing exercises provided by the surgeon  "3x/day. Denies pain, swelling or redness of the calf/lower leg. Positive for R knee swelling, weakness. Denies N/T.    She reports she is not eating very much as she does not have an appetite and has been nauseated. She is trying to stay hydrated. She reports one instance last night of having the urge to urinate, she stood up and her bladder emptied completely without her being unable to stop the flow. This has not happened again since. She has had one bowel movement since surgery. She is not sleeping well; on avg getting 6 hrs of sleep per night and waking \"quite a bit\" due to pain.    Per pre-op PT evaluation \"Pt lives in an in-law suite connected to her children's home. She spends her time with her grandson. She also enjoys shopping in stores. No stairs in the house or to enter. She is independent with ADLs and driving. She does have a h/o L TKA done about 10 years ago. She has a walker and cane... Several years ago she was an avid walker and would like to get back to this.\"    Main concern: the high pain level  Main goal: to be able to walk without the walker      Patient Goals  Patient goals for therapy: decreased edema, decreased pain, improved balance, increased motion, return to work, return to sport/leisure activities, independence with ADLs/IADLs and increased strength          Objective     Observations     Additional Observation Details  Knee covered in surgical dressing appears to be of appropriate healing without evidence of drainage    Tenderness     Additional Tenderness Details  TTP R quad, medial knee, adductor, ITB    Neurological Testing     Sensation     Knee     Right Knee   Intact: light touch     Active Range of Motion     Right Knee   Flexion: 42 degrees with pain  Extension: 5 degrees with pain    Passive Range of Motion     Right Knee   Flexion: 50 degrees with pain    Additional Passive Range of Motion Details  Seated flexion achieved approximately 60 deg    Strength/Myotome Testing "     Left Hip   Planes of Motion   Flexion: 3+  Extension: 3+  Abduction: 3+  Adduction: 3+  External rotation: 3+  Internal rotation: 3+    Right Hip   Planes of Motion   Flexion: 3+  Extension: 3+  Abduction: 3+  Adduction: 3+  External rotation: 3+  Internal rotation: 3+    Left Knee   Quadriceps contraction: fair    Right Knee   Flexion: 4-  Extension: 4-  Quadriceps contraction: poor    Functional Assessment        Comments  Gait: antalgic, RW use, decreased gait speed, RLE toe out  STS: bilateral UE assist; uncontrolled descent into the chair             Dx: R knee pain with ROM, balance and hip/knee strength deficits  EPOC: 3/15  F/u with referrin/12  Precautions: s/p R TKA; Dr. Lombardo with [facility]; 24  Comorbidities: anxiety, DM, HTN, h/o L TKA   Main concerns: high pain level  Main goals: walk without assistive device  FOTO goal: 71/100 in   FOTO progress: 48/100 on       HEP:  Access Code: [email or web address]  URL: [email or web address]  Date: 2024  Prepared by: Lisa Dumont    Exercises  - Supine Ankle Pumps  - 20 reps  - Seated Long Arc Quad  - 10 reps  - Supine Quad Set  - 20 reps - 5 sec hold  - Supine Heel Slide  - 10 reps  - Seated Hamstring Stretch  - 20 reps - 5 sec hold  - Seated Hip Adduction Isometrics with Ball  - 20 reps - 5 sec hold  - Seated Glute Squeezes  - 20 reps - 5 sec hold  - Standing Heel Raise with Support  - 20 reps        Manuals 2/2            R knee PROM Seated MOSES            RLE STM Calf, medialknee                                                   Neuro Re-Ed             NMES R quad             Weight shift s-s             tandem             SLS             Ankle circ board taps f-b, s-s             RB taps f-b, s-s                                       Ther Ex             Nustep - cardio, ROM             Bike - cardio, ROM             UBE - cardio, pain mod             Calf stretch             HR/TR             HS stretch             LAQ              Quad set             SLR             Heel slide             Glute set             Hip add iso             Hip abd iso                                                    Ther Activity                                       Gait Training             Heel toe in RW             SPC                          Modalities             CP

## 2024-02-06 ENCOUNTER — OFFICE VISIT (OUTPATIENT)
Dept: PHYSICAL THERAPY | Facility: CLINIC | Age: 69
End: 2024-02-06
Payer: MEDICARE

## 2024-02-06 DIAGNOSIS — Z96.651 TOTAL KNEE REPLACEMENT STATUS, RIGHT: Primary | ICD-10-CM

## 2024-02-06 PROCEDURE — 97110 THERAPEUTIC EXERCISES: CPT | Performed by: PHYSICAL THERAPIST

## 2024-02-06 PROCEDURE — 97140 MANUAL THERAPY 1/> REGIONS: CPT | Performed by: PHYSICAL THERAPIST

## 2024-02-06 NOTE — PROGRESS NOTES
Daily Note     Today's date: 2024  Patient name: Beronica Villalpando  : 1955  MRN: [id number]  Referring provider: Kai Lombardo MD  Dx:   Encounter Diagnosis     ICD-10-CM    1. Total knee replacement status, right  Z96.651           Start Time: 1030          Subjective: She's been getting up and walking every hour and doing the exercises daily as well. Still has a lot of pain.      Objective: See treatment diary below      Assessment: Pt ambulating with greater gait speed and knee flexion today vs previous session though her knee flexion continues to be limited and toe out is excessive. She has a low tolerance to PROM and most TE with medial knee pain and cramping noted throughout session. Knee flexion ROM better and more tolerable in seated than supine. Continues to have flexion and extension deficits, though I believe extension deficits are primarily due to joint edema. Surgical dressing removed today in clinic; incision appears to be of appropriate healing without signs of drainage or infection. Favorable to MHP end of session. Tolerated treatment well. Patient demonstrated fatigue post treatment and would benefit from continued PT      Plan: Continue per plan of care.      Dx: R knee pain with ROM, balance and hip/knee strength deficits  EPOC: 3/15  F/u with referrin/12  Precautions: s/p R TKA; Dr. Lombardo with [facility]; 24  Comorbidities: anxiety, DM, HTN, h/o L TKA   Main concerns: high pain level  Main goals: walk without assistive device  FOTO goal: 71/100 in 14  FOTO progress: 48/100 on       HEP:  Access Code: [email or web address]  URL: [email or web address]  Date: 2024  Prepared by: Lisa Dumont    Exercises  - Supine Ankle Pumps  - 20 reps  - Seated Long Arc Quad  - 10 reps  - Supine Quad Set  - 20 reps - 5 sec hold  - Supine Heel Slide  - 10 reps  - Seated Hamstring Stretch  - 20 reps - 5 sec hold  - Seated Hip Adduction Isometrics with Ball  - 20 reps - 5 sec  hold  - Seated Glute Squeezes  - 20 reps - 5 sec hold  - Standing Heel Raise with Support  - 20 reps        Manuals 2/2 2/6           R knee PROM Seated MOSES Seated, supine MOSES           RLE STM Calf, medialknee Medial HS, post knee, calf           Dressing removal  MOSES                                     Neuro Re-Ed             NMES R quad             Weight shift s-s             tandem             SLS             Ankle circ board taps f-b, s-s             RB taps f-b, s-s                                       Ther Ex             Nustep - cardio, ROM             Bike - cardio, ROM             UBE - cardio, pain mod             Calf stretch             HR/TR             HS stretch             LAQ             Quad set  1/2 foam knee x20           SLR  AA x10           Heel slide w strap  2x5           Glute set  Leg press into PT leg x20           Hip add iso  Long leg +toe IR, ball at knees x20           Hip abd iso                                                    Ther Activity                                       Gait Training             Heel toe in RW             SPC                          Modalities             MHP  8'

## 2024-02-08 DIAGNOSIS — I10 BENIGN ESSENTIAL HYPERTENSION: ICD-10-CM

## 2024-02-08 RX ORDER — METOPROLOL TARTRATE 50 MG/1
TABLET, FILM COATED ORAL
Qty: 180 TABLET | Refills: 3 | Status: SHIPPED | OUTPATIENT
Start: 2024-02-08

## 2024-02-09 ENCOUNTER — OFFICE VISIT (OUTPATIENT)
Dept: PHYSICAL THERAPY | Facility: CLINIC | Age: 69
End: 2024-02-09
Payer: MEDICARE

## 2024-02-09 DIAGNOSIS — Z96.651 TOTAL KNEE REPLACEMENT STATUS, RIGHT: Primary | ICD-10-CM

## 2024-02-09 PROCEDURE — 97140 MANUAL THERAPY 1/> REGIONS: CPT | Performed by: PHYSICAL THERAPIST

## 2024-02-09 PROCEDURE — 97110 THERAPEUTIC EXERCISES: CPT | Performed by: PHYSICAL THERAPIST

## 2024-02-09 NOTE — PROGRESS NOTES
"Daily Note     Today's date: 2024  Patient name: Beronica Villalpando  : 1955  MRN: [id number]  Referring provider: Kai Lombardo MD  Dx:   Encounter Diagnosis     ICD-10-CM    1. Total knee replacement status, right  Z96.651                      Subjective: yesterday was a good day, but she's in a lot of pain today. Didn't sleep at all last night. Feels like theres a ball behind the knee at night. Has been walking without any assistive device at home and reports walking steadiness is \"okay\".       Objective: See treatment diary below      Assessment: Pt with fair gait without AD. She is steady while ambulating, though demonstrates bilateral trendelenburg and excessive L lumbar rotation during R swing through. Performed several standing hip flexion kicks and then cued to squeeze hip flexor muscle with tactile cue while walking. She was able to correct the over-rotation mildly, though carry over was poor. Heel-toe strike was good and knee flexion through swing was fair. She has a low tolerance to PROM and most TE. Knee flexion ROM better and more tolerable in seated than supine. Continues to have flexion and extension deficits, though I believe extension deficits are primarily due to joint edema. Favorable to MHP end of session. Tolerated treatment well. Patient demonstrated fatigue post treatment and would benefit from continued PT      Plan: Continue per plan of care.      Dx: R knee pain with ROM, balance and hip/knee strength deficits  EPOC: 3/15  F/u with referrin/12  Precautions: s/p R TKA; Dr. Lombardo with [facility]; 24  Comorbidities: anxiety, DM, HTN, h/o L TKA   Main concerns: high pain level  Main goals: walk without assistive device  FOTO goal: 71/100 in 14  FOTO progress: 48/100 on       HEP:  Access Code: [email or web address]  URL: [email or web address]  Date: 2024  Prepared by: Lisa Dumont    Exercises  - Supine Ankle Pumps  - 20 reps  - Seated Long Arc Quad  - " 10 reps  - Supine Quad Set  - 20 reps - 5 sec hold  - Supine Heel Slide  - 10 reps  - Seated Hamstring Stretch  - 20 reps - 5 sec hold  - Seated Hip Adduction Isometrics with Ball  - 20 reps - 5 sec hold  - Seated Glute Squeezes  - 20 reps - 5 sec hold  - Standing Heel Raise with Support  - 20 reps        Manuals 2/2 2/6 2/9          R knee PROM Seated MOSES Seated, supine MOSES Seated, supine MOSES          RLE STM Calf, medialknee Medial HS, post knee, calf Medial HS, post knee, calf          Dressing removal  MOSES                                     Neuro Re-Ed             NMES R quad             Weight shift s-s             tandem             SLS             Ankle circ board taps f-b, s-s             RB taps f-b, s-s                                       Ther Ex             Nustep - cardio, ROM             Bike - cardio, ROM             UBE - cardio, pain mod             Calf stretch   2'          HR/TR   X20 ea          HS stretch             LAQ             Quad set  1/2 foam knee x20           SLR  AA x10           Heel slide w strap  2x5           Glute set  Leg press into PT leg x20           Hip add iso  Long leg +toe IR, ball at knees x20 Long leg +toe IR, ball at knees x20          Hip abd iso                                                    Ther Activity                                       Gait Training             Hip flexion through gait   5'                                    Modalities             P  8' 8'

## 2024-02-13 ENCOUNTER — APPOINTMENT (OUTPATIENT)
Dept: PHYSICAL THERAPY | Facility: CLINIC | Age: 69
End: 2024-02-13
Payer: MEDICARE

## 2024-02-16 ENCOUNTER — OFFICE VISIT (OUTPATIENT)
Dept: PHYSICAL THERAPY | Facility: CLINIC | Age: 69
End: 2024-02-16
Payer: MEDICARE

## 2024-02-16 DIAGNOSIS — Z96.651 TOTAL KNEE REPLACEMENT STATUS, RIGHT: Primary | ICD-10-CM

## 2024-02-16 PROCEDURE — 97110 THERAPEUTIC EXERCISES: CPT | Performed by: PHYSICAL THERAPIST

## 2024-02-16 PROCEDURE — 97140 MANUAL THERAPY 1/> REGIONS: CPT | Performed by: PHYSICAL THERAPIST

## 2024-02-16 NOTE — PROGRESS NOTES
"Daily Note     Today's date: 2024  Patient name: Beronica Villalpando  : 1955  MRN: [id number]  Referring provider: Kai Lombardo MD  Dx:   Encounter Diagnosis     ICD-10-CM    1. Total knee replacement status, right  Z96.651           Start Time: 1033          Subjective: yesterday was a good day, but she's in a lot of pain today. Didn't sleep at all last night. Feels like theres a ball behind the knee at night. Has been walking without any assistive device at home and reports walking steadiness is \"okay\".       Objective: See treatment diary below      Assessment: Pt walking well without AD today. She does continue to have mild circumduction and limited hip flexion during swing, which is causing excessive lumbar rotation. Knee PROM is progressing well; nearly full both ranges. No issues with knee ext machine and leg curl. Quad activation is fair. Able to perform SLR unassisted with minimal lag. Tolerated treatment well. Patient demonstrated fatigue post treatment and would benefit from continued PT      Plan: Continue per plan of care.      Dx: R knee pain with ROM, balance and hip/knee strength deficits  EPOC: 3/15  F/u with referrin/12  Precautions: s/p R TKA; Dr. Lombardo with [facility]; 24  Comorbidities: anxiety, DM, HTN, h/o L TKA   Main concerns: high pain level  Main goals: walk without assistive device  FOTO goal: 71/100 in   FOTO progress: 48/100 on       HEP:  Access Code: [email or web address]  URL: [email or web address]  Date: 2024  Prepared by: Lisa Dumont    Exercises  - Supine Quad Set  - 20 reps - 5 sec hold  - Seated Hamstring Stretch  - 20 reps - 5 sec hold  - Standing Marching  - 20 reps  - Standing Knee Flexion Strengthening at Chair  - 20 reps      Manuals          R knee PROM Seated MOSES Seated, supine MOSES Seated, supine MOSES Seated MOSES         RLE STM Calf, medialknee Medial HS, post knee, calf Medial HS, post knee, calf          Dressing " "removal  MOSES                                     Neuro Re-Ed             tandem             SLS             Ankle circ board taps f-b, s-s             RB taps f-b, s-s                                       Ther Ex             Nustep - cardio, ROM             Bike - cardio, ROM    5'         Calf stretch   2' 1'         HR/TR   X20 ea HR slant x10         HS stretch             Knee ext machine    0# x20 easy         HSC machine    15# x20         march    X20 ea         Standing HSC             Quad set  1/2 foam knee x20  1/2 foam knee 5\"x20         SLR  AA x10  x20         Heel slide w strap  2x5           Glute set  Leg press into PT leg x20           Hip add iso  Long leg +toe IR, ball at knees x20 Long leg +toe IR, ball at knees x20          Hip abd iso                                                    Ther Activity                                       Gait Training             Hip flexion through gait   5'                                    Modalities             MHP  8' 8'                            "

## 2024-02-20 ENCOUNTER — OFFICE VISIT (OUTPATIENT)
Dept: PHYSICAL THERAPY | Facility: CLINIC | Age: 69
End: 2024-02-20
Payer: MEDICARE

## 2024-02-20 DIAGNOSIS — Z96.651 TOTAL KNEE REPLACEMENT STATUS, RIGHT: Primary | ICD-10-CM

## 2024-02-20 PROCEDURE — 97112 NEUROMUSCULAR REEDUCATION: CPT | Performed by: PHYSICAL THERAPIST

## 2024-02-20 PROCEDURE — 97110 THERAPEUTIC EXERCISES: CPT | Performed by: PHYSICAL THERAPIST

## 2024-02-20 NOTE — PROGRESS NOTES
Daily Note     Today's date: 2024  Patient name: Beronica Villalpando  : 1955  MRN: [id number]  Referring provider: Kai Lombardo MD  Dx:   Encounter Diagnosis     ICD-10-CM    1. Total knee replacement status, right  Z96.651           Start Time: 1025          Subjective: Having a lot of pain in the back of the knee and upper calf described as on the skin and burning and it's keeping her from sleep.      Objective: See treatment diary below      Assessment: No change in symptoms with repeated lumbar ext; pt shows full ROM. She did note tension in the posterior knee with R slump test. Seated R sciatic glides with chin tucked decreased her symptoms. Given for HEP 3x/day. She did well with balance TE performed today. Sparingly using the UE for assist with tandem and side steps in the parallel bars. Tolerated treatment well. Patient demonstrated fatigue post treatment and would benefit from continued PT      Plan: Continue per plan of care.      Dx: R knee pain with ROM, balance and hip/knee strength deficits  EPOC: 3/15  F/u with referrin/12  Precautions: s/p R TKA; Dr. Lombardo with [facility]; 24  Comorbidities: anxiety, DM, HTN, h/o L TKA   Main concerns: high pain level  Main goals: walk without assistive device  FOTO goal: 71/100 in   FOTO progress: 48/100 on       HEP:  Access Code: [email or web address]  URL: [email or web address]  Date: 2024  Prepared by: Lisa Dumont    Exercises  - Supine Quad Set  - 20 reps - 5 sec hold  - Seated Hamstring Stretch  - 20 reps - 5 sec hold  - Standing Marching  - 20 reps  - Standing Knee Flexion Strengthening at Chair  - 20 reps    Access Code: [email or web address]  URL: [email or web address]  Date: 2024  Prepared by: Lisa Paredesagnera    Exercises  - Seated Sciatic Tensioner  - 3 x daily - 15 reps      Manuals  2        R knee PROM Seated MOSES Seated, supine MOSES Seated, supine MOSES Seated MOSES         RLE STM Calf,  "medialknee Medial HS, post knee, calf Medial HS, post knee, calf          Dressing removal  MOSES                                     Neuro Re-Ed             tandem             SLS             // tandem fwd-bwd     X3 laps        // side step     X3 laps         Ankle circ board taps f-b, s-s             RB taps f-b, s-s                                       Ther Ex             Bike - cardio, ROM    5' 6'        Calf stretch   2' 1' 1'        HR   X20 slant x10 Slant x20        TR   x20  x20        Seated Sciatic glide     X15  x15        Knee ext machine    0# x20 easy 10# x20         HSC machine    15# x20 20# x20        march    X20 ea X20 ea        Standing HSC             Quad set  1/2 foam knee x20  1/2 foam knee 5\"x20         SLR  AA x10  x20         Heel slide w strap  2x5           Glute set  Leg press into PT leg x20           Hip add iso  Long leg +toe IR, ball at knees x20 Long leg +toe IR, ball at knees x20          Hip abd iso                                                    Ther Activity                                       Gait Training             Hip flexion through gait   5'                                    Modalities             MHP  8' 8'                            "

## 2024-02-23 ENCOUNTER — APPOINTMENT (OUTPATIENT)
Dept: PHYSICAL THERAPY | Facility: CLINIC | Age: 69
End: 2024-02-23
Payer: MEDICARE

## 2024-02-27 ENCOUNTER — OFFICE VISIT (OUTPATIENT)
Dept: PHYSICAL THERAPY | Facility: CLINIC | Age: 69
End: 2024-02-27
Payer: MEDICARE

## 2024-02-27 DIAGNOSIS — Z96.651 TOTAL KNEE REPLACEMENT STATUS, RIGHT: Primary | ICD-10-CM

## 2024-02-27 PROCEDURE — 97110 THERAPEUTIC EXERCISES: CPT | Performed by: PHYSICAL THERAPIST

## 2024-02-27 NOTE — PROGRESS NOTES
Daily Note     Today's date: 2024  Patient name: Beronica Villalpando  : 1955  MRN: [id number]  Referring provider: Kai Lombardo MD  Dx:   Encounter Diagnosis     ICD-10-CM    1. Total knee replacement status, right  Z96.651           Start Time: 1034          Subjective: F/u with surgeon went well. No nerve pain in the back of the knee and upper calf since she's been doing the sciatic glides introduced lv.      Objective: See treatment diary below      Assessment: Doing well with dynamic balance and will benefit from further challenge statically nv. Able to tolerate increased volume of knee ext and HSC machine today. Added STS from elevated chair. She demonstrates L hip shift and was able to correct mildly with verbal and tactile cues though carryover was poor. Tolerated treatment well. Patient demonstrated fatigue post treatment and would benefit from continued PT      Plan: Continue per plan of care.      Dx: R knee pain with ROM, balance and hip/knee strength deficits  EPOC: 3/15  F/u with referrin/12  Precautions: s/p R TKA; Dr. Lombardo with [facility]; 24  Comorbidities: anxiety, DM, HTN, h/o L TKA   Main concerns: high pain level  Main goals: walk without assistive device  FOTO goal: 71/100 in   FOTO progress: 48/100 on       HEP:  Access Code: [email or web address]  URL: [email or web address]  Date: 2024  Prepared by: Lisa Dumont    Exercises  - Supine Quad Set  - 20 reps - 5 sec hold  - Seated Hamstring Stretch  - 20 reps - 5 sec hold  - Standing Marching  - 20 reps  - Standing Knee Flexion Strengthening at Chair  - 20 reps    Access Code: [email or web address]  URL: [email or web address]  Date: 2024  Prepared by: Lisa Castagnera    Exercises  - Seated Sciatic Tensioner  - 3 x daily - 15 reps      Manuals        R knee PROM Seated MOSES Seated, supine MOSES Seated, supine MOSES Seated MOSES         RLE STM Calf, medialknee Medial HS, post knee,  "calf Medial HS, post knee, calf          Dressing removal  MOSES                                     Neuro Re-Ed             tandem             SLS             // tandem fwd-bwd     X3 laps X3 laps 1UE       // side step     X3 laps  Ytb X3 laps 0UE       Ankle circ board taps f-b, s-s             RB taps f-b, s-s                                       Ther Ex             Bike - cardio, ROM    5' 6' 6'       Calf stretch   2' 1' 1' Slant 2'       HR   X20 slant x10 Slant x20 Slant x20       TR   x20  x20 x20       Seated Sciatic glide     X15  x15        Knee ext machine    0# x20 easy 10# x20  15# 2x20        HSC machine    15# x20 20# x20 20# 2x20       march    X20 ea X20 ea 2\"x20 ea       STS      1 airex 2x10       Quad set  1/2 foam knee x20  1/2 foam knee 5\"x20         SLR  AA x10  x20         Heel slide w strap  2x5           Glute set  Leg press into PT leg x20           Hip add iso  Long leg +toe IR, ball at knees x20 Long leg +toe IR, ball at knees x20          Hip abd iso                                                    Ther Activity                                       Gait Training             Hip flexion through gait   5'                                    Modalities             MHP  8' 8'                            "

## 2024-03-01 ENCOUNTER — OFFICE VISIT (OUTPATIENT)
Dept: PHYSICAL THERAPY | Facility: CLINIC | Age: 69
End: 2024-03-01
Payer: MEDICARE

## 2024-03-01 DIAGNOSIS — Z96.651 TOTAL KNEE REPLACEMENT STATUS, RIGHT: Primary | ICD-10-CM

## 2024-03-01 PROCEDURE — 97110 THERAPEUTIC EXERCISES: CPT | Performed by: PHYSICAL THERAPIST

## 2024-03-01 PROCEDURE — 97140 MANUAL THERAPY 1/> REGIONS: CPT | Performed by: PHYSICAL THERAPIST

## 2024-03-01 NOTE — PROGRESS NOTES
Daily Note     Today's date: 3/1/2024  Patient name: Beronica Villalpando  : 1955  MRN: [id number]  Referring provider: Kai Lombardo MD  Dx:   Encounter Diagnosis     ICD-10-CM    1. Total knee replacement status, right  Z96.651           Start Time: 1000          Subjective: Her grandson accidentally kicked the front of her R knee and she's been sore from that.      Objective: See treatment diary below      Assessment: R knee ROM progressing well. Extension is near full and flexion is limited mildly with firm end feel. L knee ext is lacking, though visible improvement noted after PROM. Appropriately challenged with table TE and balance tasks. Continues to have slight L hip shift with STS, though descent is more controlled. Tolerated treatment well. Patient demonstrated fatigue post treatment and would benefit from continued PT      Plan: Continue per plan of care.      Dx: R knee pain with ROM, balance and hip/knee strength deficits  EPOC: 3/15  F/u with referrin/12  Precautions: s/p R TKA; Dr. Lombardo with [facility]; 24  Comorbidities: anxiety, DM, HTN, h/o L TKA   Main concerns: high pain level  Main goals: walk without assistive device  FOTO goal: 71/100 in   FOTO progress: 48/100 on       HEP:  Access Code: [email or web address]  URL: [email or web address]  Date: 2024  Prepared by: Lisa Castagnera    Exercises  - Supine Quad Set  - 20 reps - 5 sec hold  - Seated Hamstring Stretch  - 20 reps - 5 sec hold  - Standing Marching  - 20 reps  - Standing Knee Flexion Strengthening at Chair  - 20 reps    Access Code: [email or web address]  URL: [email or web address]  Date: 2024  Prepared by: Lisa Castagnera    Exercises  - Seated Sciatic Tensioner  - 3 x daily - 15 reps      Manuals 2/2 2/6 2/9 2/16 2/20 2/27 3/1      R knee PROM Seated MOSES Seated, supine MOSES Seated, supine MOSES Seated MOSES   R knee flex, L knee ext      RLE STM Calf, medialknee Medial HS, post knee, calf Medial HS,  "post knee, calf          Dressing removal  MOSES                                     Neuro Re-Ed             tandem       30\"x2 ea      SLS             // tandem fwd-bwd     X3 laps X3 laps 1UE       // side step     X3 laps  Ytb X3 laps 0UE       Ankle circ board taps f-b, s-s             RB taps f-b, s-s       X20 ea                                Ther Ex             Bike - cardio, ROM    5' 6' 6' 6'      Calf stretch   2' 1' 1' Slant 2'       HR   X20 slant x10 Slant x20 Slant x20       TR   x20  x20 x20       Seated Sciatic glide     X15  x15        SLR       X20 ea      S/L hip abd       X20 ea      Knee ext machine    0# x20 easy 10# x20  15# 2x20        HSC machine    15# x20 20# x20 20# 2x20       march    X20 ea X20 ea 2\"x20 ea       STS      1 airex 2x10 1 airex x20      Quad set  1/2 foam knee x20  1/2 foam knee 5\"x20         SLR  AA x10  x20         Heel slide w strap  2x5           Glute set  Leg press into PT leg x20           Hip add iso  Long leg +toe IR, ball at knees x20 Long leg +toe IR, ball at knees x20          Hip abd iso                                                    Ther Activity                                       Gait Training             Hip flexion through gait   5'                                    Modalities             MHP  8' 8'                            "

## 2024-03-05 ENCOUNTER — OFFICE VISIT (OUTPATIENT)
Dept: PHYSICAL THERAPY | Facility: CLINIC | Age: 69
End: 2024-03-05
Payer: MEDICARE

## 2024-03-05 DIAGNOSIS — Z96.651 TOTAL KNEE REPLACEMENT STATUS, RIGHT: Primary | ICD-10-CM

## 2024-03-05 PROCEDURE — 97110 THERAPEUTIC EXERCISES: CPT | Performed by: PHYSICAL THERAPIST

## 2024-03-05 PROCEDURE — 97112 NEUROMUSCULAR REEDUCATION: CPT | Performed by: PHYSICAL THERAPIST

## 2024-03-05 NOTE — PROGRESS NOTES
Daily Note     Today's date: 3/5/2024  Patient name: Beronica Villalpando  : 1955  MRN: [id number]  Referring provider: Kai Lombardo MD  Dx:   Encounter Diagnosis     ICD-10-CM    1. Total knee replacement status, right  Z96.651           Start Time: 1030          Subjective: R knee is stiff at night.      Objective: See treatment diary below      Assessment: Doing well with general hip and knee strengthening. She does continue to have L lateral shift with STS though demonstrated greater eccentric control while lowering to normal seat height today (vs elevated seat height in previous sessions). Fair ability for lateral step up; she does avoid hip flexion/knee flexion with circumduction compensation around the step to achieve step up height. Plan to address this with damon step overs and marching training nv. Tolerated treatment well. Patient demonstrated fatigue post treatment and would benefit from continued PT      Plan: Continue per plan of care.      Dx: R knee pain with ROM, balance and hip/knee strength deficits  EPOC: 3/15  F/u with referrin/12  Precautions: s/p R TKA; Dr. Lombardo with [facility]; 24  Comorbidities: anxiety, DM, HTN, h/o L TKA   Main concerns: high pain level  Main goals: walk without assistive device  FOTO goal: 71/100 in   FOTO progress: 48/100 on       HEP:  Access Code: [email or web address]  URL: [email or web address]  Date: 2024  Prepared by: Lisa Dumont    Exercises  - Supine Quad Set  - 20 reps - 5 sec hold  - Seated Hamstring Stretch  - 20 reps - 5 sec hold  - Standing Marching  - 20 reps  - Standing Knee Flexion Strengthening at Chair  - 20 reps    Access Code: [email or web address]  URL: [email or web address]  Date: 2024  Prepared by: Lisa Paredesagnera    Exercises  - Seated Sciatic Tensioner  - 3 x daily - 15 reps      Manuals 2/2 2/6 2/9 2/16 2/20 2/27 3/1 3/5     R knee PROM Seated MOSES Seated, supine MOSES Seated, supine MOSES Seated MOSES   R  "knee flex, L knee ext      RLE STM Calf, medialknee Medial HS, post knee, calf Medial HS, post knee, calf          Dressing removal  MOSES                                     Neuro Re-Ed             tandem       30\"x2 ea      SLS             // tandem fwd-bwd     X3 laps X3 laps 1UE       // side step     X3 laps  Ytb X3 laps 0UE  Ytb X4 laps 0UE     Ankle circ board taps f-b, s-s             RB taps f-b, s-s       X20 ea X20 ea     Uvaldo lat step over        nv                  Ther Ex             Bike - cardio, ROM    5' 6' 6' 6' 6'     Calf stretch   2' 1' 1' Slant 2'  Slant 1'     HR   X20 slant x10 Slant x20 Slant x20  Slant x20     TR   x20  x20 x20       Seated Sciatic glide     X15  x15        SLR       X20 ea      S/L hip abd       X20 ea      Knee ext machine    0# x20 easy 10# x20  15# 2x20   20# 2x20     HSC machine    15# x20 20# x20 20# 2x20  20# 2x20     march    X20 ea X20 ea 2\"x20 ea       STS      1 airex 2x10 1 airex x20 2x10     Step up+over lat        6in 2x10     Quad set  1/2 foam knee x20  1/2 foam knee 5\"x20         SLR  AA x10  x20         Heel slide w strap  2x5           Glute set  Leg press into PT leg x20           Hip add iso  Long leg +toe IR, ball at knees x20 Long leg +toe IR, ball at knees x20          Hip abd iso                                                    Ther Activity                                       Gait Training             Hip flexion through gait   5'                                    Modalities             MHP  8' 8'                            "

## 2024-03-08 ENCOUNTER — OFFICE VISIT (OUTPATIENT)
Dept: PHYSICAL THERAPY | Facility: CLINIC | Age: 69
End: 2024-03-08
Payer: MEDICARE

## 2024-03-08 DIAGNOSIS — Z96.651 TOTAL KNEE REPLACEMENT STATUS, RIGHT: Primary | ICD-10-CM

## 2024-03-08 PROCEDURE — 97110 THERAPEUTIC EXERCISES: CPT | Performed by: PHYSICAL THERAPIST

## 2024-03-08 PROCEDURE — 97140 MANUAL THERAPY 1/> REGIONS: CPT | Performed by: PHYSICAL THERAPIST

## 2024-03-08 NOTE — PROGRESS NOTES
Daily Note     Today's date: 3/8/2024  Patient name: Beronica Villalpando  : 1955  MRN: [id number]  Referring provider: Kai Lombardo MD  Dx:   Encounter Diagnosis     ICD-10-CM    1. Total knee replacement status, right  Z96.651           Start Time: 1005          Subjective: No new complaints      Objective: See treatment diary below      Assessment: L knee flexion deficit is limiting her ability to perform bike normally; visible improvement after seated PROM. Very challenged with R single leg HR. STS with more control and less shift. Able to perform banded side step outside of parallel bars without need for assist today. Appropriately challenged with strength tasks. HEP updated with SL HR and more knee flexion stretching for the L knee. Tolerated treatment well. Patient demonstrated fatigue post treatment and would benefit from continued PT      Plan: Continue per plan of care.      Dx: R knee pain with ROM, balance and hip/knee strength deficits  EPOC: 3/15  F/u with referrin/12  Precautions: s/p R TKA; Dr. Lombardo with [facility]; 24  Comorbidities: anxiety, DM, HTN, h/o L TKA   Main concerns: high pain level  Main goals: walk without assistive device  FOTO goal: 71/100 in   FOTO progress: 48/100 on       HEP:  Access Code: [email or web address]  URL: [email or web address]  Date: 2024  Prepared by: Lisa Castagnera    Exercises  - Supine Quad Set  - 20 reps - 5 sec hold  - Seated Hamstring Stretch  - 20 reps - 5 sec hold  - Standing Marching  - 20 reps  - Standing Knee Flexion Strengthening at Chair  - 20 reps    Access Code: [email or web address]  URL: [email or web address]  Date: 2024  Prepared by: Lisa Castagnera    Exercises  - Seated Sciatic Tensioner  - 3 x daily - 15 reps      Manuals 2/2 2/6 2/9 2/16 2/20 2/27 3/1 3/5 3/8    R knee PROM Seated MOSES Seated, supine MOSES Seated, supine MOSES Seated MOSES   R knee flex, L knee ext  L knee flex/ext in seated    RLE STM Calf,  "medialknee Medial HS, post knee, calf Medial HS, post knee, calf          Dressing removal  MOSES                                     Neuro Re-Ed             tandem       30\"x2 ea      SLS             // tandem fwd-bwd     X3 laps X3 laps 1UE       // side step     X3 laps  Ytb X3 laps 0UE  Ytb X4 laps 0UE     Ankle circ board taps f-b, s-s             RB taps f-b, s-s       X20 ea X20 ea     Uvaldo lat step over        nv                  Ther Ex             Bike - cardio, ROM    5' 6' 6' 6' 6' 6'    Calf stretch   2' 1' 1' Slant 2'  Slant 1'     HR   X20 slant x10 Slant x20 Slant x20  Slant x20     TR   x20  x20 x20       Seated Sciatic glide     X15  x15        SLR       X20 ea      S/L hip abd       X20 ea      Knee ext machine    0# x20 easy 10# x20  15# 2x20   20# 2x20 25# 2x20    HSC machine    15# x20 20# x20 20# 2x20  20# 2x20 25# 2x20    march    X20 ea X20 ea 2\"x20 ea       SL HR with opp LE in high march         X10 ea    Side step         Ytb 2x10    STS      1 airex 2x10 1 airex x20 2x10 2x10    Step up+over lat        6in 2x10     Quad set  1/2 foam knee x20  1/2 foam knee 5\"x20         SLR  AA x10  x20         Heel slide w strap  2x5           Glute set  Leg press into PT leg x20           Hip add iso  Long leg +toe IR, ball at knees x20 Long leg +toe IR, ball at knees x20          Hip abd iso                                                    Ther Activity                                       Gait Training             Hip flexion through gait   5'                                    Modalities             MHP  8' 8'                            "

## 2024-03-12 ENCOUNTER — APPOINTMENT (OUTPATIENT)
Dept: PHYSICAL THERAPY | Facility: CLINIC | Age: 69
End: 2024-03-12
Payer: MEDICARE

## 2024-03-15 ENCOUNTER — EVALUATION (OUTPATIENT)
Dept: PHYSICAL THERAPY | Facility: CLINIC | Age: 69
End: 2024-03-15
Payer: MEDICARE

## 2024-03-15 DIAGNOSIS — Z96.651 TOTAL KNEE REPLACEMENT STATUS, RIGHT: Primary | ICD-10-CM

## 2024-03-15 PROCEDURE — 97140 MANUAL THERAPY 1/> REGIONS: CPT | Performed by: PHYSICAL THERAPIST

## 2024-03-15 PROCEDURE — 97110 THERAPEUTIC EXERCISES: CPT | Performed by: PHYSICAL THERAPIST

## 2024-03-15 NOTE — PROGRESS NOTES
PT Re-Evaluation     Today's date: 3/15/2024  Patient name: Beronica Villalpando  : 1955  MRN: [id number]  Referring provider: Kai Lombardo MD  Dx:   Encounter Diagnosis     ICD-10-CM    1. Total knee replacement status, right  Z96.651                      Assessment  Assessment details: Problem List:  1) R knee ext and flex ROM deficits- addressing with passive and active stretching  2) R knee and hip strength deficits - addressing with neuromuscular re-education and progressive strength program  3) gait dysfunction - addressing with gait and proprioception training    Beronica Villalpando is a pleasant 68 y.o. female who presents with R knee pain s/p R TKA done by Dr. Lombardo with [facility] on 24.  she has R knee ROM restriction, R knee and hip weakness, and gait dysfunction resulting in difficulties with ADLs, unsteady gait with reliance on assistive device and decreased participation in recreational activities.  No further referral appears necessary at this time based upon examination results.  I expect she will improve moderately in 8-12 weeks of skilled PT and transition to home maintenance program.        Comparable signs:  1) walking  2) STS      RE: 3/15/24  Beronica Villalpando has attended physical therapy for 10 visits. In this time, they have made significant improvements in symptoms and function, as noted by subjective report, improved balance, ROM, strength, flexibility and activity tolerance. They have made positive goal progress with FOTO score improvement from 48 at initial evaluation to 77 currently. They do continue to have impairments in pain, ROM, strength, balance, flexibility and activity tolerance. Recommend continued skilled PT in order to maximize function.      Impairments: abnormal muscle firing, abnormal or restricted ROM, activity intolerance, impaired balance, impaired physical strength, lacks appropriate home exercise program, pain with function and poor body  mechanics  Barriers to therapy: n/a  Understanding of Dx/Px/POC: excellent  Goals  ST.  Independent with HEP in 2 weeks - met  2. Decrease pain by 50% in 3 wks - met  3.  Increase R knee ext AROM to 0 degrees in 3 weeks - met    LT. Achieve FOTO score of 71/100 in 6 weeks - met  2.  Able to walk for household distances without AD use in 6 weeks - met  3.  Strength = 4+/5 R knee ext in 8 weeks - met  4. Able to perform STS with good mechanics, no UE assist in 8 weeks - met    5. Able to walk 1 mile in 6 weeks  6. Increase R knee flex to 105 deg in 6 weeks  7. Increase L knee flex to 90 deg in 6 weeks.      Plan  Plan details: RE in 6 weeks.  Patient would benefit from: skilled physical therapy  Planned modality interventions: cryotherapy, electrical stimulation/Russian stimulation and thermotherapy: hydrocollator packs  Planned therapy interventions: abdominal trunk stabilization, manual therapy, neuromuscular re-education, therapeutic activities, therapeutic exercise, body mechanics training and home exercise program  Frequency: 2x week  Duration in weeks: 6  Plan of Care beginning date: 3/15/2024  Plan of Care expiration date: 2024  Treatment plan discussed with: patient        Subjective Evaluation    History of Present Illness  Mechanism of injury: Pt is 4 days s/p R TKA done by Dr. Lombardo at [facility] on 24.    She reports overall she is in 10/10 knee pain. Pain is managed with tylenol, tramadol and oxycodone and ice. She is ambulating with RW and reports feeling steady on her feet. She reports she is doing exercises provided by the surgeon 3x/day. Denies pain, swelling or redness of the calf/lower leg. Positive for R knee swelling, weakness. Denies N/T.    She reports she is not eating very much as she does not have an appetite and has been nauseated. She is trying to stay hydrated. She reports one instance last night of having the urge to urinate, she stood up and her bladder  "emptied completely without her being unable to stop the flow. This has not happened again since. She has had one bowel movement since surgery. She is not sleeping well; on avg getting 6 hrs of sleep per night and waking \"quite a bit\" due to pain.    Per pre-op PT evaluation \"Pt lives in an in-law suite connected to her children's home. She spends her time with her grandson. She also enjoys shopping in stores. No stairs in the house or to enter. She is independent with ADLs and driving. She does have a h/o L TKA done about 10 years ago. She has a walker and cane... Several years ago she was an avid walker and would like to get back to this.\"    Main concern: the high pain level  Main goal: to be able to walk without the walker      RE: 3/15/24  Pt is 7 wks s/p R TKA. Overall doing very well since starting therapy. Pain is minimal to none. The pain complaint she has is stiffness in both knees; the L knee is more stiff than the R most days. She is not taking any pain medications. She is ambulating without assistive device and back to driving, all ADLs independently. She will walk 2 blocks a day for exercise, which takes her 15 mins. She would like to be able to walk further for exercise (2 miles around the soccer fields in town) and eventually join a gym.  Patient Goals  Patient goals for therapy: decreased edema, decreased pain, improved balance, increased motion, return to work, return to sport/leisure activities, independence with ADLs/IADLs and increased strength        Objective     Observations     Additional Observation Details  Mild edema noted    Tenderness     Additional Tenderness Details  TTP R quad, medial knee, adductor, ITB    SLR to 90 deg with HS restriction, no quad lag    Neurological Testing     Sensation     Knee     Right Knee   Intact: light touch     Active Range of Motion   Left Knee   Flexion: 81 degrees   Extension: 8 degrees     Right Knee   Flexion: 95 degrees   Extension: 0 degrees " "WFL    Additional Active Range of Motion Details  After PROM, AROM R knee 0-100, L knee 5-85    Passive Range of Motion     Additional Passive Range of Motion Details  Seated flexion achieved approximately 60 deg    Strength/Myotome Testing     Left Hip   Planes of Motion   Flexion: 5  Extension: 4+  Abduction: 4+  Adduction: 5  External rotation: 4+  Internal rotation: 4+    Right Hip   Planes of Motion   Flexion: 5  Extension: 4+  Abduction: 4+  Adduction: 5  External rotation: 4+  Internal rotation: 4+    Left Knee   Flexion: 4+  Extension: 4+  Quadriceps contraction: fair    Right Knee   Flexion: 4+  Extension: 4+  Quadriceps contraction: good    Left Ankle/Foot   Dorsiflexion: 5    Right Ankle/Foot   Dorsiflexion: 4+    Functional Assessment        Comments  Gait: WFL  STS: WFL, equal WB, no shift noted, controlled descent             Dx: R knee pain with ROM, balance and hip/knee strength deficits  EPOC:   F/u with referrin/12  Precautions: s/p R TKA; Dr. Lombardo with [facility]; 24  Comorbidities: anxiety, DM, HTN, h/o L TKA   Main concerns: high pain level  Main goals: walk without assistive device  FOTO goal: 71/100 in 14  FOTO progress: 48/100 on , 77 on 3/15      HEP:  Access Code: [email or web address]  URL: [email or web address]  Date: 03/15/2024  Prepared by: Lisa Dumont    Exercises  - Prone Quadriceps Stretch with Strap  - 15 reps - 10 sec hold  - Seated Quad Set  - 15 reps - 10 sec hold      Manuals 2/2 2/6 2/9 2/16 2/20 2/27 3/1 3/5 3/8 3/15   RE          MOSES   FOTO          MOSES   R knee PROM Seated MOSES Seated, supine MOSES Seated, supine MOSES Seated MOSES   R knee flex, L knee ext  L knee flex/ext in seated Flex/ext supine   L knee PROM          Flex/ext supine   RLE STM Calf, medialknee Medial HS, post knee, calf Medial HS, post knee, calf          Dressing removal  MOSES                                     Neuro Re-Ed             tandem       30\"x2 ea      SLS             // tandem " "fwd-bwd     X3 laps X3 laps 1UE       // side step     X3 laps  Ytb X3 laps 0UE  Ytb X4 laps 0UE     Ankle circ board taps f-b, s-s             RB taps f-b, s-s       X20 ea X20 ea     Uvaldo lat step over        nv                  Ther Ex             Bike - cardio, ROM    5' 6' 6' 6' 6' 6' 6'   Prone knee flex with strap          10\"x15 R   Seated knee ext with OP          10\"x5 R   Calf stretch   2' 1' 1' Slant 2'  Slant 1'     HR   X20 slant x10 Slant x20 Slant x20  Slant x20     TR   x20  x20 x20       Seated Sciatic glide     X15  x15        SLR       X20 ea      S/L hip abd       X20 ea      Knee ext machine    0# x20 easy 10# x20  15# 2x20   20# 2x20 25# 2x20    HSC machine    15# x20 20# x20 20# 2x20  20# 2x20 25# 2x20    march    X20 ea X20 ea 2\"x20 ea       SL HR with opp LE in high march         X10 ea    Side step         Ytb 2x10    STS      1 airex 2x10 1 airex x20 2x10 2x10    Step up+over lat        6in 2x10     Quad set  1/2 foam knee x20  1/2 foam knee 5\"x20         SLR  AA x10  x20         Heel slide w strap  2x5           Glute set  Leg press into PT leg x20           Hip add iso  Long leg +toe IR, ball at knees x20 Long leg +toe IR, ball at knees x20          Hip abd iso                                                    Ther Activity                                       Gait Training             Hip flexion through gait   5'                                    Modalities             MHP  8' 8'                           "

## 2024-03-15 NOTE — LETTER
March 15, 2024    Kai Lombardo MD  [address] [facility] [address]    Patient: Beronica Villalpando   YOB: 1955   Date of Visit: 3/15/2024     Encounter Diagnosis     ICD-10-CM    1. Total knee replacement status, right  Z96.651           Dear Dr. Lombardo:    Thank you for your recent referral of Beronica Villalpando. Please review the attached evaluation summary from Beronica's recent visit.     Please verify that you agree with the plan of care by signing the attached order.     If you have any questions or concerns, please do not hesitate to call.     I sincerely appreciate the opportunity to share in the care of one of your patients and hope to have another opportunity to work with you in the near future.       Sincerely,    Lisa Dumont, PT      Referring Provider:      I certify that I have read the below Plan of Care and certify the need for these services furnished under this plan of treatment while under my care.                    Kai Lombardo MD  [address] [facility] [address]  Via Fax: [phone]          PT Re-Evaluation     Today's date: 3/15/2024  Patient name: Beronica Villalpando  : 1955  MRN: [id number]  Referring provider: Kai Lombardo MD  Dx:   Encounter Diagnosis     ICD-10-CM    1. Total knee replacement status, right  Z96.651                      Assessment  Assessment details: Problem List:  1) R knee ext and flex ROM deficits- addressing with passive and active stretching  2) R knee and hip strength deficits - addressing with neuromuscular re-education and progressive strength program  3) gait dysfunction - addressing with gait and proprioception training    Beronica Villalpando is a pleasant 68 y.o. female who presents with R knee pain s/p R TKA done by Dr. Lombardo with [facility] on 24.  she has R knee ROM restriction, R knee and hip weakness, and gait dysfunction resulting in difficulties with ADLs, unsteady gait with reliance on assistive  device and decreased participation in recreational activities.  No further referral appears necessary at this time based upon examination results.  I expect she will improve moderately in 8-12 weeks of skilled PT and transition to home maintenance program.        Comparable signs:  1) walking  2) STS      RE: 3/15/24  Beronica Villalpando has attended physical therapy for 10 visits. In this time, they have made significant improvements in symptoms and function, as noted by subjective report, improved balance, ROM, strength, flexibility and activity tolerance. They have made positive goal progress with FOTO score improvement from 48 at initial evaluation to 77 currently. They do continue to have impairments in pain, ROM, strength, balance, flexibility and activity tolerance. Recommend continued skilled PT in order to maximize function.      Impairments: abnormal muscle firing, abnormal or restricted ROM, activity intolerance, impaired balance, impaired physical strength, lacks appropriate home exercise program, pain with function and poor body mechanics  Barriers to therapy: n/a  Understanding of Dx/Px/POC: excellent  Goals  ST.  Independent with HEP in 2 weeks - met  2. Decrease pain by 50% in 3 wks - met  3.  Increase R knee ext AROM to 0 degrees in 3 weeks - met    LT. Achieve FOTO score of 71/100 in 6 weeks - met  2.  Able to walk for household distances without AD use in 6 weeks - met  3.  Strength = 4+/5 R knee ext in 8 weeks - met  4. Able to perform STS with good mechanics, no UE assist in 8 weeks - met    5. Able to walk 1 mile in 6 weeks  6. Increase R knee flex to 105 deg in 6 weeks  7. Increase L knee flex to 90 deg in 6 weeks.      Plan  Plan details: RE in 6 weeks.  Patient would benefit from: skilled physical therapy  Planned modality interventions: cryotherapy, electrical stimulation/Russian stimulation and thermotherapy: hydrocollator packs  Planned therapy interventions: abdominal trunk  "stabilization, manual therapy, neuromuscular re-education, therapeutic activities, therapeutic exercise, body mechanics training and home exercise program  Frequency: 2x week  Duration in weeks: 6  Plan of Care beginning date: 3/15/2024  Plan of Care expiration date: 4/26/2024  Treatment plan discussed with: patient        Subjective Evaluation    History of Present Illness  Mechanism of injury: Pt is 4 days s/p R TKA done by Dr. Lombardo at [facility] on 1/29/24.    She reports overall she is in 10/10 knee pain. Pain is managed with tylenol, tramadol and oxycodone and ice. She is ambulating with RW and reports feeling steady on her feet. She reports she is doing exercises provided by the surgeon 3x/day. Denies pain, swelling or redness of the calf/lower leg. Positive for R knee swelling, weakness. Denies N/T.    She reports she is not eating very much as she does not have an appetite and has been nauseated. She is trying to stay hydrated. She reports one instance last night of having the urge to urinate, she stood up and her bladder emptied completely without her being unable to stop the flow. This has not happened again since. She has had one bowel movement since surgery. She is not sleeping well; on avg getting 6 hrs of sleep per night and waking \"quite a bit\" due to pain.    Per pre-op PT evaluation \"Pt lives in an in-law suite connected to her children's home. She spends her time with her grandson. She also enjoys shopping in stores. No stairs in the house or to enter. She is independent with ADLs and driving. She does have a h/o L TKA done about 10 years ago. She has a walker and cane... Several years ago she was an avid walker and would like to get back to this.\"    Main concern: the high pain level  Main goal: to be able to walk without the walker      RE: 3/15/24  Pt is 7 wks s/p R TKA. Overall doing very well since starting therapy. Pain is minimal to none. The pain complaint she has is stiffness in " both knees; the L knee is more stiff than the R most days. She is not taking any pain medications. She is ambulating without assistive device and back to driving, all ADLs independently. She will walk 2 blocks a day for exercise, which takes her 15 mins. She would like to be able to walk further for exercise (2 miles around the soccer fields in town) and eventually join a gym.  Patient Goals  Patient goals for therapy: decreased edema, decreased pain, improved balance, increased motion, return to work, return to sport/leisure activities, independence with ADLs/IADLs and increased strength        Objective     Observations     Additional Observation Details  Mild edema noted    Tenderness     Additional Tenderness Details  TTP R quad, medial knee, adductor, ITB    SLR to 90 deg with HS restriction, no quad lag    Neurological Testing     Sensation     Knee     Right Knee   Intact: light touch     Active Range of Motion   Left Knee   Flexion: 81 degrees   Extension: 8 degrees     Right Knee   Flexion: 95 degrees   Extension: 0 degrees WFL    Additional Active Range of Motion Details  After PROM, AROM R knee 0-100, L knee 5-85    Passive Range of Motion     Additional Passive Range of Motion Details  Seated flexion achieved approximately 60 deg    Strength/Myotome Testing     Left Hip   Planes of Motion   Flexion: 5  Extension: 4+  Abduction: 4+  Adduction: 5  External rotation: 4+  Internal rotation: 4+    Right Hip   Planes of Motion   Flexion: 5  Extension: 4+  Abduction: 4+  Adduction: 5  External rotation: 4+  Internal rotation: 4+    Left Knee   Flexion: 4+  Extension: 4+  Quadriceps contraction: fair    Right Knee   Flexion: 4+  Extension: 4+  Quadriceps contraction: good    Left Ankle/Foot   Dorsiflexion: 5    Right Ankle/Foot   Dorsiflexion: 4+    Functional Assessment        Comments  Gait: WFL  STS: WFL, equal WB, no shift noted, controlled descent             Dx: R knee pain with ROM, balance and hip/knee  "strength deficits  EPOC:   F/u with referrin/12  Precautions: s/p R TKA; Dr. Lombardo with [facility]; 24  Comorbidities: anxiety, DM, HTN, h/o L TKA   Main concerns: high pain level  Main goals: walk without assistive device  FOTO goal: 71/100 in 14  FOTO progress: 48/100 on , 77 on 3/15      HEP:  Access Code: [email or web address]  URL: [email or web address]  Date: 03/15/2024  Prepared by: Lisa Dumont    Exercises  - Prone Quadriceps Stretch with Strap  - 15 reps - 10 sec hold  - Seated Quad Set  - 15 reps - 10 sec hold      Manuals 2/2 2/6 2/9 2/16 2/20 2/27 3/1 3/5 3/8 3/15   RE          MOSES   FOTO          MOSES   R knee PROM Seated MOSES Seated, supine MOSES Seated, supine MOSES Seated MOSES   R knee flex, L knee ext  L knee flex/ext in seated Flex/ext supine   L knee PROM          Flex/ext supine   RLE STM Calf, medialknee Medial HS, post knee, calf Medial HS, post knee, calf          Dressing removal  MOSES                                     Neuro Re-Ed             tandem       30\"x2 ea      SLS             // tandem fwd-bwd     X3 laps X3 laps 1UE       // side step     X3 laps  Ytb X3 laps 0UE  Ytb X4 laps 0UE     Ankle circ board taps f-b, s-s             RB taps f-b, s-s       X20 ea X20 ea     Uvaldo lat step over        nv                  Ther Ex             Bike - cardio, ROM    5' 6' 6' 6' 6' 6' 6'   Prone knee flex with strap          10\"x15 R   Seated knee ext with OP          10\"x5 R   Calf stretch   2' 1' 1' Slant 2'  Slant 1'     HR   X20 slant x10 Slant x20 Slant x20  Slant x20     TR   x20  x20 x20       Seated Sciatic glide     X15  x15        SLR       X20 ea      S/L hip abd       X20 ea      Knee ext machine    0# x20 easy 10# x20  15# 2x20   20# 2x20 25# 2x20    HSC machine    15# x20 20# x20 20# 2x20  20# 2x20 25# 2x20    march    X20 ea X20 ea 2\"x20 ea       SL HR with opp LE in high march         X10 ea    Side step         Ytb 2x10    STS      1 airex 2x10 1 airex x20 2x10 " "2x10    Step up+over lat        6in 2x10     Quad set  1/2 foam knee x20  1/2 foam knee 5\"x20         SLR  AA x10  x20         Heel slide w strap  2x5           Glute set  Leg press into PT leg x20           Hip add iso  Long leg +toe IR, ball at knees x20 Long leg +toe IR, ball at knees x20          Hip abd iso                                                    Ther Activity                                       Gait Training             Hip flexion through gait   5'                                    Modalities             P  8' 8'                                 "

## 2024-03-18 DIAGNOSIS — M17.0 PRIMARY OSTEOARTHRITIS OF BOTH KNEES: ICD-10-CM

## 2024-03-18 DIAGNOSIS — E11.9 TYPE 2 DIABETES MELLITUS WITHOUT COMPLICATION, WITHOUT LONG-TERM CURRENT USE OF INSULIN (HCC): ICD-10-CM

## 2024-03-18 NOTE — TELEPHONE ENCOUNTER
Beronica needs the following scripts re-filled:      Lantus Solostar pen 3ml  - 30 units hs  this goes to [facility]    Tramadol - this goes to [facility]

## 2024-03-19 ENCOUNTER — OFFICE VISIT (OUTPATIENT)
Dept: PHYSICAL THERAPY | Facility: CLINIC | Age: 69
End: 2024-03-19
Payer: MEDICARE

## 2024-03-19 DIAGNOSIS — Z96.651 TOTAL KNEE REPLACEMENT STATUS, RIGHT: Primary | ICD-10-CM

## 2024-03-19 PROCEDURE — 97110 THERAPEUTIC EXERCISES: CPT | Performed by: PHYSICAL THERAPIST

## 2024-03-19 NOTE — PROGRESS NOTES
Daily Note     Today's date: 3/19/2024  Patient name: Beronica Villalpando  : 1955  MRN: [id number]  Referring provider: Kai Lombardo MD  Dx:   Encounter Diagnosis     ICD-10-CM    1. Total knee replacement status, right  Z96.651           Start Time: 1035          Subjective: The knee flexion stretch with the dog strap really aggravated the legs.      Objective: See treatment diary below      Assessment: Pt performs stairs with moderate hip hiking during the ascent and bilateral [place] on the descent with uncontrolled eccentric knee flexion. Added knee flexion and ankle DF stretch on the step and she did show better performance with ascending stairs to follow. R glute med is very weak with heel tap off step, unable to appropriately maintain level hip when she goes into single leg stance. This is similar on the L, though not as extreme. Added banded hip abd, ext and flex which she was appropriately challenged with. HEP updated. Tolerated treatment well. Patient demonstrated fatigue post treatment and would benefit from continued PT      Plan: Continue per plan of care.      Dx: R knee pain with ROM, balance and hip/knee strength deficits  EPOC:   F/u with referrin/12  Precautions: s/p R TKA; Dr. Lombardo with [facility]; 24  Comorbidities: anxiety, DM, HTN, h/o L TKA   Main concerns: high pain level  Main goals: walk without assistive device  FOTO goal: 71/100 in 14  FOTO progress: 48/100 on , 77 on 3/15      HEP:  Access Code: [email or web address]  URL: [email or web address]  Date: 2024  Prepared by: Lisa Dumont    Exercises  - Hip Abduction with Resistance Loop  - 2 sets - 10 reps  - Hip Extension with Resistance Loop  - 2 sets - 10 reps  - Standing Hip Flexion with Resistance Loop  - 2 sets - 10 reps  - Standing Knee Flexion Stretch on Step  - 15 reps - 5 sec hold      Manuals 3/19      3/1 3/5 3/8 3/15   WILBER LOPES   R knee PROM       R knee flex,  "L knee ext  L knee flex/ext in seated Flex/ext supine   L knee PROM          Flex/ext supine                                                       Neuro Re-Ed             tandem       30\"x2 ea      // side step        Ytb X4 laps 0UE     RB taps f-b, s-s       X20 ea X20 ea     Uvaldo lat step over        nv                                            Ther Ex             Bike - cardio, ROM 6'      6' 6' 6' 6'   Prone knee flex with strap          10\"x15 R   Seated knee ext with OP          10\"x5 R   Knee flex on step 5\"x10 ea            Calf stretch        Slant 1'     HR        Slant x20     SLR       X20 ea      S/L hip abd       X20 ea      Knee ext machine 35# 3x10       20# 2x20 25# 2x20    HSC machine 35# 3x10       20# 2x20 25# 2x20    SL HR with opp LE in high march         X10 ea    Side step         Ytb 2x10    STS       1 airex x20 2x10 2x10    Step up+over lat        6in 2x10     Heel tap off step 4in 2x10 ea            3way hip Otb loop 2x10 ea                                                   Ther Activity             FF stairs x2                         Gait Training                                                    Modalities             MHP                                   "

## 2024-03-20 RX ORDER — TRAMADOL HYDROCHLORIDE 50 MG/1
50 TABLET ORAL 2 TIMES DAILY PRN
Qty: 60 TABLET | Refills: 3 | Status: SHIPPED | OUTPATIENT
Start: 2024-03-20

## 2024-03-20 RX ORDER — INSULIN GLARGINE 100 [IU]/ML
INJECTION, SOLUTION SUBCUTANEOUS
Qty: 45 ML | Refills: 1 | Status: SHIPPED | OUTPATIENT
Start: 2024-03-20

## 2024-03-22 ENCOUNTER — APPOINTMENT (OUTPATIENT)
Dept: PHYSICAL THERAPY | Facility: CLINIC | Age: 69
End: 2024-03-22
Payer: MEDICARE

## 2024-03-26 ENCOUNTER — OFFICE VISIT (OUTPATIENT)
Dept: PHYSICAL THERAPY | Facility: CLINIC | Age: 69
End: 2024-03-26
Payer: MEDICARE

## 2024-03-26 DIAGNOSIS — Z96.651 TOTAL KNEE REPLACEMENT STATUS, RIGHT: Primary | ICD-10-CM

## 2024-03-26 PROCEDURE — 97140 MANUAL THERAPY 1/> REGIONS: CPT | Performed by: PHYSICAL THERAPIST

## 2024-03-26 PROCEDURE — 97110 THERAPEUTIC EXERCISES: CPT | Performed by: PHYSICAL THERAPIST

## 2024-03-26 NOTE — PROGRESS NOTES
Daily Note     Today's date: 3/26/2024  Patient name: Beronica Villalpando  : 1955  MRN: [id number]  Referring provider: Kai Lombardo MD  Dx:   Encounter Diagnosis     ICD-10-CM    1. Total knee replacement status, right  Z96.651           Start Time: 1033          Subjective: Doing okay, feeling congested with allergies.      Objective: See treatment diary below      Assessment: Added hip hike with foot on step for hip abduction/pelvis stabilization training in single leg. Motor control with this was good bilaterally as the repetitions progressed. Then performed the hip hike plus step up. She was able to demonstrate better stabilization of the pelvis in neutral with less hip drop during the step up with this cue. L knee flexion continues to be stiffer than right. She will benefit from further ROM work. Tolerated treatment well. Patient demonstrated fatigue post treatment and would benefit from continued PT      Plan: Continue per plan of care.      Dx: R knee pain with ROM, balance and hip/knee strength deficits  EPOC:   F/u with referrin/12  Precautions: s/p R TKA; Dr. Lombardo with [facility]; 24  Comorbidities: anxiety, DM, HTN, h/o L TKA   Main concerns: high pain level  Main goals: walk without assistive device  FOTO goal: 71/100 in 14  FOTO progress: 48/100 on , 77 on 3/15      HEP:  Access Code: [email or web address]  URL: [email or web address]  Date: 2024  Prepared by: Lisa Dumont    Exercises  - Hip Abduction with Resistance Loop  - 2 sets - 10 reps  - Hip Extension with Resistance Loop  - 2 sets - 10 reps  - Standing Hip Flexion with Resistance Loop  - 2 sets - 10 reps  - Standing Knee Flexion Stretch on Step  - 15 reps - 5 sec hold      Manuals 3/19 3/26     3/1 3/5 3/8 3/15   RE          MOSES   FOTO          MOSES   R knee PROM  Flex/ext supine     R knee flex, L knee ext  L knee flex/ext in seated Flex/ext supine   L knee PROM  Flex/ext supine        Flex/ext supine      "                                                  Neuro Re-Ed             tandem       30\"x2 ea      // side step        Ytb X4 laps 0UE     RB taps f-b, s-s       X20 ea X20 ea     Uvaldo lat step over        nv                                            Ther Ex             Bike - cardio, ROM 6' 6'     6' 6' 6' 6'   Prone knee flex with strap          10\"x15 R   Seated knee ext with OP          10\"x5 R   Knee flex on step 5\"x10 ea 5\"x15 ea           Calf stretch        Slant 1'     HR        Slant x20     SLR       X20 ea      S/L hip abd       X20 ea      Knee ext machine 35# 3x10       20# 2x20 25# 2x20    HSC machine 35# 3x10       20# 2x20 25# 2x20    SL HR with opp LE in high march         X10 ea    Side step         Ytb 2x10    STS       1 airex x20 2x10 2x10    Step up+over lat        6in 2x10     Heel tap off step 4in 2x10 ea            3way hip Otb loop 2x10 ea            Hip hike front foot on step  3x10 ea           Hip hike +step up  6in 3x10 ea                        Ther Activity             FF stairs x2                         Gait Training                                                    Modalities             MHP                                   "

## 2024-03-29 ENCOUNTER — OFFICE VISIT (OUTPATIENT)
Dept: PHYSICAL THERAPY | Facility: CLINIC | Age: 69
End: 2024-03-29
Payer: MEDICARE

## 2024-03-29 DIAGNOSIS — Z96.651 TOTAL KNEE REPLACEMENT STATUS, RIGHT: Primary | ICD-10-CM

## 2024-03-29 PROCEDURE — 97110 THERAPEUTIC EXERCISES: CPT | Performed by: PHYSICAL THERAPIST

## 2024-03-29 PROCEDURE — 97140 MANUAL THERAPY 1/> REGIONS: CPT | Performed by: PHYSICAL THERAPIST

## 2024-03-29 NOTE — PROGRESS NOTES
Daily Note - d/c summary     Today's date: 3/29/2024  Patient name: Beronica Villalpando  : 1955  MRN: [id number]  Referring provider: Kai Lombardo MD  Dx:   Encounter Diagnosis     ICD-10-CM    1. Total knee replacement status, right  Z96.651           Start Time: 1035          Subjective: Doing well and having no issues with ADLs or driving. Ready for d/c today.      Objective: See treatment diary below      Assessment: Beronica Villalpando has attended physical therapy for 14 visits. In this time, they have made significant improvements in symptoms and function, as noted by subjective report, improved balance, ROM, strength, flexibility and activity tolerance. They have made positive goal progress with FOTO score improvement from 48 at initial evaluation to 77 currently. Recommend d/c to HEP at this time.    Goals  ST.  Independent with HEP in 2 weeks - met  2. Decrease pain by 50% in 3 wks - met  3.  Increase R knee ext AROM to 0 degrees in 3 weeks - met    LT. Achieve FOTO score of 71/100 in 6 weeks - met  2.  Able to walk for household distances without AD use in 6 weeks - met  3.  Strength = 4+/5 R knee ext in 8 weeks - met  4. Able to perform STS with good mechanics, no UE assist in 8 weeks - met    5. Able to walk 1 mile in 6 weeks - near met  6. Increase R knee flex to 105 deg in 6 weeks- near met  7. Increase L knee flex to 90 deg in 6 weeks.- near met        Plan: d/c to HEP     Dx: R knee pain with ROM, balance and hip/knee strength deficits  EPOC:   F/u with referrin/12  Precautions: s/p R TKA; Dr. Lombardo with [facility]; 24  Comorbidities: anxiety, DM, HTN, h/o L TKA   Main concerns: high pain level  Main goals: walk without assistive device  FOTO goal: 71/100 in 14  FOTO progress: 48/100 on , 77 on 3/15      HEP:  Access Code: [email or web address]  URL: [email or web address]  Date: 2024  Prepared by: Lisa Dumont    Exercises  - Hip Abduction with  "Resistance Loop  - 2 sets - 10 reps  - Hip Extension with Resistance Loop  - 2 sets - 10 reps  - Standing Hip Flexion with Resistance Loop  - 2 sets - 10 reps  - Standing Knee Flexion Stretch on Step  - 15 reps - 5 sec hold      Manuals 3/19 3/26 3/29    3/1 3/5 3/8 3/15   RE   MOSES       MOSES   FOTO          MOSES   R knee PROM  Flex/ext supine Flex seated    R knee flex, L knee ext  L knee flex/ext in seated Flex/ext supine   L knee PROM  Flex/ext supine Flex seated       Flex/ext supine                                                       Neuro Re-Ed             tandem       30\"x2 ea      // side step        Ytb X4 laps 0UE     RB taps f-b, s-s       X20 ea X20 ea     Uvaldo lat step over        nv                                            Ther Ex             Bike - cardio, ROM 6' 6' 6'    6' 6' 6' 6'   Prone knee flex with strap          10\"x15 R   Seated knee ext with OP          10\"x5 R   Knee flex on step 5\"x10 ea 5\"x15 ea 5\"x15 ea          Calf stretch        Slant 1'     HR        Slant x20     SLR       X20 ea      S/L hip abd       X20 ea      Knee ext machine 35# 3x10       20# 2x20 25# 2x20    HSC machine 35# 3x10       20# 2x20 25# 2x20    SL HR with opp LE in high march         X10 ea    Side step         Ytb 2x10    STS       1 airex x20 2x10 2x10    Step up+over lat        6in 2x10     Heel tap off step 4in 2x10 ea  4in 2x10 ea          3way hip Otb loop 2x10 ea            Hip hike front foot on step  3x10 ea X20 ea          Hip hike +step up  6in 3x10 ea 8in 3x10 ea                       Ther Activity             FF stairs x2                         Gait Training                                                    Modalities             MHP                                   "

## 2024-04-01 DIAGNOSIS — E11.9 TYPE 2 DIABETES MELLITUS WITHOUT COMPLICATION, WITHOUT LONG-TERM CURRENT USE OF INSULIN (HCC): ICD-10-CM

## 2024-04-01 RX ORDER — BLOOD SUGAR DIAGNOSTIC
STRIP MISCELLANEOUS
Qty: 150 STRIP | Refills: 3 | Status: SHIPPED | OUTPATIENT
Start: 2024-04-01

## 2024-04-29 DIAGNOSIS — E11.9 TYPE 2 DIABETES MELLITUS WITHOUT COMPLICATION, WITHOUT LONG-TERM CURRENT USE OF INSULIN (HCC): ICD-10-CM

## 2024-04-29 RX ORDER — INSULIN LISPRO 100 [IU]/ML
INJECTION, SOLUTION INTRAVENOUS; SUBCUTANEOUS
Qty: 60 ML | Refills: 2 | Status: SHIPPED | OUTPATIENT
Start: 2024-04-29

## 2024-05-16 ENCOUNTER — TELEPHONE (OUTPATIENT)
Dept: FAMILY MEDICINE CLINIC | Facility: HOSPITAL | Age: 69
End: 2024-05-16

## 2024-05-16 DIAGNOSIS — E11.9 TYPE 2 DIABETES MELLITUS WITHOUT COMPLICATION, WITHOUT LONG-TERM CURRENT USE OF INSULIN (HCC): ICD-10-CM

## 2024-05-16 RX ORDER — INSULIN LISPRO 100 [IU]/ML
INJECTION, SOLUTION INTRAVENOUS; SUBCUTANEOUS
Qty: 60 ML | Refills: 2 | Status: SHIPPED | OUTPATIENT
Start: 2024-05-16

## 2024-05-16 NOTE — TELEPHONE ENCOUNTER
COULD YOU PLEASE CALL [facility] - SHE NEEDS TO EXPLAIN HER INSULIN TO US - I THOUGHT SHE HAD REFILLS BUT SHE SAID NO - NEEDS TO RUN IT BY SOMEONE    THANKS

## 2024-06-10 ENCOUNTER — TELEPHONE (OUTPATIENT)
Age: 69
End: 2024-06-10

## 2024-06-10 NOTE — TELEPHONE ENCOUNTER
Pharmacist calling reagrding Rx: Xu stated no covered by insurance. Alt medication Rx: Basaglar Kwikpen is covered.     Please review and advise.   Thank You.

## 2024-06-11 DIAGNOSIS — E11.9 TYPE 2 DIABETES MELLITUS WITHOUT COMPLICATION, WITHOUT LONG-TERM CURRENT USE OF INSULIN (HCC): Primary | ICD-10-CM

## 2024-06-11 DIAGNOSIS — E11.9 TYPE 2 DIABETES MELLITUS WITHOUT COMPLICATION, WITHOUT LONG-TERM CURRENT USE OF INSULIN (HCC): ICD-10-CM

## 2024-06-11 RX ORDER — INSULIN GLARGINE 100 [IU]/ML
30 INJECTION, SOLUTION SUBCUTANEOUS
Qty: 45 ML | Refills: 5 | Status: SHIPPED | OUTPATIENT
Start: 2024-06-11

## 2024-06-11 RX ORDER — INSULIN GLARGINE 100 [IU]/ML
30 INJECTION, SOLUTION SUBCUTANEOUS
Qty: 45 ML | Refills: 5 | Status: SHIPPED | OUTPATIENT
Start: 2024-06-11 | End: 2024-06-11 | Stop reason: SDUPTHER

## 2024-07-02 ENCOUNTER — OFFICE VISIT (OUTPATIENT)
Dept: FAMILY MEDICINE CLINIC | Facility: HOSPITAL | Age: 69
End: 2024-07-02
Payer: MEDICARE

## 2024-07-02 ENCOUNTER — APPOINTMENT (OUTPATIENT)
Dept: LAB | Facility: HOSPITAL | Age: 69
End: 2024-07-02
Payer: MEDICARE

## 2024-07-02 VITALS
OXYGEN SATURATION: 99 % | BODY MASS INDEX: 36.06 KG/M2 | SYSTOLIC BLOOD PRESSURE: 130 MMHG | WEIGHT: 191 LBS | HEART RATE: 70 BPM | DIASTOLIC BLOOD PRESSURE: 80 MMHG | HEIGHT: 61 IN

## 2024-07-02 DIAGNOSIS — E66.09 CLASS 2 OBESITY DUE TO EXCESS CALORIES WITHOUT SERIOUS COMORBIDITY WITH BODY MASS INDEX (BMI) OF 36.0 TO 36.9 IN ADULT: ICD-10-CM

## 2024-07-02 DIAGNOSIS — E11.3293 MILD NONPROLIFERATIVE DIABETIC RETINOPATHY OF BOTH EYES WITHOUT MACULAR EDEMA ASSOCIATED WITH TYPE 2 DIABETES MELLITUS (HCC): ICD-10-CM

## 2024-07-02 DIAGNOSIS — Z00.00 ROUTINE ADULT HEALTH MAINTENANCE: ICD-10-CM

## 2024-07-02 DIAGNOSIS — E03.9 ACQUIRED HYPOTHYROIDISM: ICD-10-CM

## 2024-07-02 DIAGNOSIS — M35.00 H/O SJOGREN'S DISEASE (HCC): ICD-10-CM

## 2024-07-02 DIAGNOSIS — I10 BENIGN ESSENTIAL HYPERTENSION: ICD-10-CM

## 2024-07-02 DIAGNOSIS — M17.11 PRIMARY OSTEOARTHRITIS OF RIGHT KNEE: ICD-10-CM

## 2024-07-02 DIAGNOSIS — E78.00 PURE HYPERCHOLESTEROLEMIA: ICD-10-CM

## 2024-07-02 DIAGNOSIS — D72.829 LEUKOCYTOSIS, UNSPECIFIED TYPE: ICD-10-CM

## 2024-07-02 DIAGNOSIS — E11.9 TYPE 2 DIABETES MELLITUS WITHOUT COMPLICATION, WITHOUT LONG-TERM CURRENT USE OF INSULIN (HCC): ICD-10-CM

## 2024-07-02 DIAGNOSIS — E11.9 TYPE 2 DIABETES MELLITUS WITHOUT COMPLICATION, WITHOUT LONG-TERM CURRENT USE OF INSULIN (HCC): Primary | ICD-10-CM

## 2024-07-02 PROBLEM — E66.812 CLASS 2 OBESITY DUE TO EXCESS CALORIES WITHOUT SERIOUS COMORBIDITY WITH BODY MASS INDEX (BMI) OF 36.0 TO 36.9 IN ADULT: Status: ACTIVE | Noted: 2024-07-02

## 2024-07-02 LAB
BASOPHILS # BLD AUTO: 0.05 THOUSANDS/ÂΜL (ref 0–0.1)
BASOPHILS NFR BLD AUTO: 0 % (ref 0–1)
CREAT UR-MCNC: 80.8 MG/DL
EOSINOPHIL # BLD AUTO: 0.37 THOUSAND/ÂΜL (ref 0–0.61)
EOSINOPHIL NFR BLD AUTO: 3 % (ref 0–6)
ERYTHROCYTE [DISTWIDTH] IN BLOOD BY AUTOMATED COUNT: 13.3 % (ref 11.6–15.1)
HCT VFR BLD AUTO: 39.2 % (ref 34.8–46.1)
HGB BLD-MCNC: 12.6 G/DL (ref 11.5–15.4)
IMM GRANULOCYTES # BLD AUTO: 0.1 THOUSAND/UL (ref 0–0.2)
IMM GRANULOCYTES NFR BLD AUTO: 1 % (ref 0–2)
LYMPHOCYTES # BLD AUTO: 3.59 THOUSANDS/ÂΜL (ref 0.6–4.47)
LYMPHOCYTES NFR BLD AUTO: 24 % (ref 14–44)
MCH RBC QN AUTO: 27.8 PG (ref 26.8–34.3)
MCHC RBC AUTO-ENTMCNC: 32.1 G/DL (ref 31.4–37.4)
MCV RBC AUTO: 87 FL (ref 82–98)
MICROALBUMIN UR-MCNC: <7 MG/L
MONOCYTES # BLD AUTO: 0.9 THOUSAND/ÂΜL (ref 0.17–1.22)
MONOCYTES NFR BLD AUTO: 6 % (ref 4–12)
NEUTROPHILS # BLD AUTO: 10.05 THOUSANDS/ÂΜL (ref 1.85–7.62)
NEUTS SEG NFR BLD AUTO: 66 % (ref 43–75)
NRBC BLD AUTO-RTO: 0 /100 WBCS
PLATELET # BLD AUTO: 286 THOUSANDS/UL (ref 149–390)
PMV BLD AUTO: 11.5 FL (ref 8.9–12.7)
RBC # BLD AUTO: 4.53 MILLION/UL (ref 3.81–5.12)
SL AMB POCT HEMOGLOBIN AIC: 6.9 (ref ?–6.5)
TSH SERPL DL<=0.05 MIU/L-ACNC: 0.04 UIU/ML (ref 0.45–4.5)
WBC # BLD AUTO: 15.06 THOUSAND/UL (ref 4.31–10.16)

## 2024-07-02 PROCEDURE — 84443 ASSAY THYROID STIM HORMONE: CPT

## 2024-07-02 PROCEDURE — 83036 HEMOGLOBIN GLYCOSYLATED A1C: CPT | Performed by: STUDENT IN AN ORGANIZED HEALTH CARE EDUCATION/TRAINING PROGRAM

## 2024-07-02 PROCEDURE — 80053 COMPREHEN METABOLIC PANEL: CPT

## 2024-07-02 PROCEDURE — 99214 OFFICE O/P EST MOD 30 MIN: CPT | Performed by: STUDENT IN AN ORGANIZED HEALTH CARE EDUCATION/TRAINING PROGRAM

## 2024-07-02 PROCEDURE — 80061 LIPID PANEL: CPT

## 2024-07-02 PROCEDURE — 82570 ASSAY OF URINE CREATININE: CPT | Performed by: STUDENT IN AN ORGANIZED HEALTH CARE EDUCATION/TRAINING PROGRAM

## 2024-07-02 PROCEDURE — 85025 COMPLETE CBC W/AUTO DIFF WBC: CPT

## 2024-07-02 PROCEDURE — G0438 PPPS, INITIAL VISIT: HCPCS | Performed by: STUDENT IN AN ORGANIZED HEALTH CARE EDUCATION/TRAINING PROGRAM

## 2024-07-02 PROCEDURE — 36415 COLL VENOUS BLD VENIPUNCTURE: CPT

## 2024-07-02 PROCEDURE — 82043 UR ALBUMIN QUANTITATIVE: CPT | Performed by: STUDENT IN AN ORGANIZED HEALTH CARE EDUCATION/TRAINING PROGRAM

## 2024-07-02 RX ORDER — DICLOFENAC POTASSIUM 50 MG/1
50 TABLET, FILM COATED ORAL DAILY PRN
Qty: 90 TABLET | Refills: 3 | Status: SHIPPED | OUTPATIENT
Start: 2024-07-02

## 2024-07-02 NOTE — PROGRESS NOTES
Ambulatory Visit  Name: Beronica Villalpando      : 1955      MRN: [id number]  Encounter Provider: Clare Munroe DO  Encounter Date: 2024   Encounter department: [facility] [address]ARE [address]    Assessment & Plan   1. Type 2 diabetes mellitus without complication, without long-term current use of insulin (HCC)  -     Albumin / creatinine urine ratio; Future  -     POCT hemoglobin A1c  -     Albumin / creatinine urine ratio  -     tirzepatide (Mounjaro) 2.5 MG/0.5ML; Inject 0.5 mL (2.5 mg total) under the skin every 7 days  -     Comprehensive metabolic panel; Future  -     Lipid Panel with Direct LDL reflex; Future  2. Routine adult health maintenance  3. Primary osteoarthritis of right knee  -     diclofenac potassium (CATAFLAM) 50 mg tablet; Take 1 tablet (50 mg total) by mouth daily as needed (aches/ pains)  4. Benign essential hypertension  -     tirzepatide (Mounjaro) 2.5 MG/0.5ML; Inject 0.5 mL (2.5 mg total) under the skin every 7 days  5. H/O Sjogren's disease (HCC)  6. Acquired hypothyroidism  -     TSH, 3rd generation; Future  7. Mild nonproliferative diabetic retinopathy of both eyes without macular edema associated with type 2 diabetes mellitus (HCC)  8. Leukocytosis, unspecified type  -     CBC and differential; Future  9. Pure hypercholesterolemia  -     tirzepatide (Mounjaro) 2.5 MG/0.5ML; Inject 0.5 mL (2.5 mg total) under the skin every 7 days  -     Lipid Panel with Direct LDL reflex; Future  10. Class 2 obesity due to excess calories without serious comorbidity with body mass index (BMI) of 36.0 to 36.9 in adult  -     tirzepatide (Mounjaro) 2.5 MG/0.5ML; Inject 0.5 mL (2.5 mg total) under the skin every 7 days    No fam or personal hx of thyroid cancer or pancreatitis.   Discussed how it works.   If able to  decrease long acting to 20 U.   Goal am fasting BS at 110 or so.     Labs to be done today while here, fasting as is.     May need to adjust levo dose after  TSH today. Same as well for zocor, has been stable.   Return in about 3 months (around 10/2/2024) for F/U Chronic DX, A1c & TDAP.      Depression Screening and Follow-up Plan: Patient's depression screening was positive with a PHQ-9 score of 6. Patient assessed for underlying major depression. Brief counseling provided and recommend additional follow-up/re-evaluation next office visit. Grief of , chronic      Preventive health issues were discussed with patient, and age appropriate screening tests were ordered as noted in patient's After Visit Summary. Personalized health advice and appropriate referrals for health education or preventive services given if needed, as noted in patient's After Visit Summary.    History of Present Illness     HPI      Feeling well. No new issues.     1/29/24 R TKR - doing so much better now. Done with all PT & post-op.   Incision looks great.     DM2 - mealtime 4-10 U tid.     Nighttime 30 U basal.     Despite surgery, has been gaining weight unintentionally.   Difficult to lose for many yrs now.   Short stature hurts her BMI also.     Mammo in Sept.     PDMP reviewed. Rare use tramadol. Decreasing use of that & diclofenac. Pain improved with knee surgery.     Wt Readings from Last 3 Encounters:   07/02/24 86.6 kg (191 lb)   01/16/24 82.6 kg (182 lb)   12/19/23 80.7 kg (178 lb)     Temp Readings from Last 3 Encounters:   12/19/23 97.9 °F (36.6 °C)   03/15/23 98.2 °F (36.8 °C) (Temporal)   02/25/23 98.7 °F (37.1 °C)     BP Readings from Last 3 Encounters:   07/02/24 130/80   01/16/24 130/78   12/19/23 112/70     Pulse Readings from Last 3 Encounters:   07/02/24 70   01/16/24 58   12/19/23 76         Patient Care Team:  Clare Munroe DO as PCP - General (Family Medicine)    Review of Systems   Constitutional:  Negative for chills and fever.   Respiratory:  Positive for cough (dryness, 2/2 sjogrens). Negative for shortness of breath.    Cardiovascular:  Negative for chest pain  and palpitations.   Neurological:  Negative for light-headedness and headaches.     Medical History Reviewed by provider this encounter:  Tobacco  Allergies  Meds  Problems  Med Hx  Surg Hx  Fam Hx       Annual Wellness Visit Questionnaire   Beronica is here for her Subsequent Wellness visit.     Health Risk Assessment:   Patient rates overall health as excellent. Patient feels that their physical health rating is much better. Patient is very satisfied with their life. Eyesight was rated as slightly worse. Hearing was rated as slightly worse. Patient feels that their emotional and mental health rating is same. Patients states they are never, rarely angry. Patient states they are never, rarely unusually tired/fatigued. Pain experienced in the last 7 days has been none. Patient states that she has experienced no weight loss or gain in last 6 months.     Depression Screening:   PHQ-9 Score: 6      Fall Risk Screening:   In the past year, patient has experienced: no history of falling in past year      Urinary Incontinence Screening:   Patient has not leaked urine accidently in the last six months.     Home Safety:  Patient does not have trouble with stairs inside or outside of their home. Patient has working smoke alarms and has working carbon monoxide detector. Home safety hazards include: none.     Nutrition:   Current diet is Regular.     Medications:   Patient is currently taking over-the-counter supplements. OTC medications include: see medication list. Patient is able to manage medications.     Activities of Daily Living (ADLs)/Instrumental Activities of Daily Living (IADLs):   Walk and transfer into and out of bed and chair?: Yes  Dress and groom yourself?: Yes    Bathe or shower yourself?: Yes    Feed yourself? Yes  Do your laundry/housekeeping?: Yes  Manage your money, pay your bills and track your expenses?: Yes  Make your own meals?: Yes    Do your own shopping?: Yes    Previous Hospitalizations:   Any  hospitalizations or ED visits within the last 12 months?: Yes    How many hospitalizations have you had in the last year?: 1-2    Hospitalization Comments: Knee surgery    Cognitive Screening:   Provider or family/friend/caregiver concerned regarding cognition?: No    PREVENTIVE SCREENINGS      Cardiovascular Screening:    General: History Lipid Disorder    Due for: Lipid Panel      Diabetes Screening:     General: History Diabetes and Screening Current      Colorectal Cancer Screening:     General: Screening Current      Breast Cancer Screening:     General: Screening Current      Cervical Cancer Screening:    General: Screening Not Indicated      Osteoporosis Screening:    General: Screening Current      Lung Cancer Screening:     General: Screening Not Indicated      Hepatitis C Screening:    General: Screening Current    Screening, Brief Intervention, and Referral to Treatment (SBIRT)    Screening      AUDIT-C Screenin) How often did you have a drink containing alcohol in the past year? never  2) How many drinks did you have on a typical day when you were drinking in the past year? 0  3) How often did you have 6 or more drinks on one occasion in the past year? never    AUDIT-C Score: 0  Interpretation: Score 0-2 (female): Negative screen for alcohol misuse    Single Item Drug Screening:  How often have you used an illegal drug (including marijuana) or a prescription medication for non-medical reasons in the past year? never    Single Item Drug Screen Score: 0  Interpretation: Negative screen for possible drug use disorder    Other Counseling Topics:   Car/seat belt/driving safety, skin self-exam, sunscreen and calcium and vitamin D intake and regular weightbearing exercise.     Social Determinants of Health     Financial Resource Strain: Not At Risk (2024)    [facility]    Financial Resource Strain    • In the last 12 months  "did you skip medications to save money?: No    • In the last 12 months, was there a time when you needed to see a doctor but could not because of cost?: No   Food Insecurity: No Food Insecurity (7/2/2024)    Hunger Vital Sign    • Worried About Running Out of Food in the Last Year: Never true    • Ran Out of Food in the Last Year: Never true   Transportation Needs: No Transportation Needs (7/2/2024)    PRAPARE - Transportation    • Lack of Transportation (Medical): No    • Lack of Transportation (Non-Medical): No   Housing Stability: Low Risk  (7/2/2024)    Housing Stability Vital Sign    • Unable to Pay for Housing in the Last Year: No    • Number of Times Moved in the Last Year: 1    • Homeless in the Last Year: No   Utilities: Not At Risk (7/2/2024)    [facility] Utilities    • Threatened with loss of utilities: No     No results found.    Objective     /80   Pulse 70   Ht 5' 1.02\" (1.55 m)   Wt 86.6 kg (191 lb)   LMP  (LMP Unknown)   SpO2 99%   BMI 36.06 kg/m²     Physical Exam  Vitals and nursing note reviewed.   Constitutional:       General: She is not in acute distress.     Appearance: Normal appearance. She is well-developed. She is obese. She is not ill-appearing.   HENT:      Head: Normocephalic and atraumatic.   Eyes:      General: No scleral icterus.        Right eye: No discharge.         Left eye: No discharge.      Conjunctiva/sclera: Conjunctivae normal.   Neck:      Comments: No thyroid nodules or thyromegaly.  Cardiovascular:      Rate and Rhythm: Normal rate and regular rhythm.      Pulses: Normal pulses. no weak pulses.           Dorsalis pedis pulses are 2+ on the right side and 2+ on the left side.      Heart sounds: Normal heart sounds. No murmur heard.  Pulmonary:      Effort: Pulmonary effort is normal. No respiratory distress.      Breath sounds: Normal breath sounds.   Musculoskeletal:      Cervical back: Normal range of motion and neck supple. No rigidity or tenderness.      Right " lower leg: No edema.      Left lower leg: No edema.   Feet:      Right foot:      Skin integrity: No ulcer, skin breakdown, erythema, warmth, callus or dry skin.      Left foot:      Skin integrity: No ulcer, skin breakdown, erythema, warmth, callus or dry skin.   Skin:     General: Skin is warm and dry.      Capillary Refill: Capillary refill takes less than 2 seconds.   Neurological:      Mental Status: She is alert and oriented to person, place, and time.      Gait: Gait normal.   Psychiatric:         Mood and Affect: Mood normal.         Behavior: Behavior normal.         Thought Content: Thought content normal.         Judgment: Judgment normal.          Diabetic Foot Exam    Patient's shoes and socks removed.    Right Foot/Ankle   Right Foot Inspection  Skin Exam: skin normal and skin intact. No dry skin, no warmth, no callus, no erythema, no maceration, no abnormal color, no pre-ulcer, no ulcer and no callus.     Toe Exam: ROM and strength within normal limits.     Sensory   Proprioception: intact  Monofilament testing: intact    Vascular  The right DP pulse is 2+.     Left Foot/Ankle  Left Foot Inspection  Skin Exam: skin normal and skin intact. No dry skin, no warmth, no erythema, no maceration, normal color, no pre-ulcer, no ulcer and no callus.     Toe Exam: ROM and strength within normal limits.     Sensory   Proprioception: intact  Monofilament testing: intact    Vascular  The left DP pulse is 2+.     Assign Risk Category  No deformity present  No loss of protective sensation  No weak pulses  Risk: 0      Administrative Statements

## 2024-07-02 NOTE — PATIENT INSTRUCTIONS
[facility] / Mounjaro     Medicare Preventive Visit Patient Instructions  Thank you for completing your Welcome to Medicare Visit or Medicare Annual Wellness Visit today. Your next wellness visit will be due in one year (7/3/2025).  The screening/preventive services that you may require over the next 5-10 years are detailed below. Some tests may not apply to you based off risk factors and/or age. Screening tests ordered at today's visit but not completed yet may show as past due. Also, please note that scanned in results may not display below.  Preventive Screenings:  Service Recommendations Previous Testing/Comments   Colorectal Cancer Screening  * Colonoscopy    * Fecal Occult Blood Test (FOBT)/Fecal Immunochemical Test (FIT)  * Fecal DNA/Cologuard Test  * Flexible Sigmoidoscopy Age: 45-75 years old   Colonoscopy: every 10 years (may be performed more frequently if at higher risk)  OR  FOBT/FIT: every 1 year  OR  Cologuard: every 3 years  OR  Sigmoidoscopy: every 5 years  Screening may be recommended earlier than age 45 if at higher risk for colorectal cancer. Also, an individualized decision between you and your healthcare provider will decide whether screening between the ages of 76-85 would be appropriate. Colonoscopy: 03/15/2023  FOBT/FIT: Not on file  Cologuard: Not on file  Sigmoidoscopy: Not on file    Screening Current     Breast Cancer Screening Age: 40+ years old  Frequency: every 1-2 years  Not required if history of left and right mastectomy Mammogram: 09/06/2023    Screening Current   Cervical Cancer Screening Between the ages of 21-29, pap smear recommended once every 3 years.   Between the ages of 30-65, can perform pap smear with HPV co-testing every 5 years.   Recommendations may differ for women with a history of total hysterectomy, cervical cancer, or abnormal pap smears in past. Pap Smear: Not on file    Screening Not Indicated   Hepatitis C Screening Once for adults born between 1945 and  1965  More frequently in patients at high risk for Hepatitis C Hep C Antibody: 06/30/2008    Screening Current   Diabetes Screening 1-2 times per year if you're at risk for diabetes or have pre-diabetes Fasting glucose: 106 mg/dL (1/18/2024)  A1C: 6.7 % (1/18/2024)  Screening Not Indicated  History Diabetes   Cholesterol Screening Once every 5 years if you don't have a lipid disorder. May order more often based on risk factors. Lipid panel: 03/03/2023    Screening Not Indicated  History Lipid Disorder     Other Preventive Screenings Covered by Medicare:  Abdominal Aortic Aneurysm (AAA) Screening: covered once if your at risk. You're considered to be at risk if you have a family history of AAA.  Lung Cancer Screening: covers low dose CT scan once per year if you meet all of the following conditions: (1) Age 55-77; (2) No signs or symptoms of lung cancer; (3) Current smoker or have quit smoking within the last 15 years; (4) You have a tobacco smoking history of at least 20 pack years (packs per day multiplied by number of years you smoked); (5) You get a written order from a healthcare provider.  Glaucoma Screening: covered annually if you're considered high risk: (1) You have diabetes OR (2) Family history of glaucoma OR (3)  aged 50 and older OR (4)  American aged 65 and older  Osteoporosis Screening: covered every 2 years if you meet one of the following conditions: (1) You're estrogen deficient and at risk for osteoporosis based off medical history and other findings; (2) Have a vertebral abnormality; (3) On glucocorticoid therapy for more than 3 months; (4) Have primary hyperparathyroidism; (5) On osteoporosis medications and need to assess response to drug therapy.   Last bone density test (DXA Scan): 09/06/2023.  HIV Screening: covered annually if you're between the age of 15-65. Also covered annually if you are younger than 15 and older than 65 with risk factors for HIV infection. For  pregnant patients, it is covered up to 3 times per pregnancy.    Immunizations:  Immunization Recommendations   Influenza Vaccine Annual influenza vaccination during flu season is recommended for all persons aged >= 6 months who do not have contraindications   Pneumococcal Vaccine   * Pneumococcal conjugate vaccine = PCV13 (Prevnar 13), PCV15 (Vaxneuvance), PCV20 (Prevnar 20)  * Pneumococcal polysaccharide vaccine = PPSV23 (Pneumovax) Adults 19-65 yo with certain risk factors or if 65+ yo  If never received any pneumonia vaccine: recommend Prevnar 20 (PCV20)  Give PCV20 if previously received 1 dose of PCV13 or PPSV23   Hepatitis B Vaccine 3 dose series if at intermediate or high risk (ex: diabetes, end stage renal disease, liver disease)   Respiratory syncytial virus (RSV) Vaccine - COVERED BY MEDICARE PART D  * RSVPreF3 (Arexvy) CDC recommends that adults 60 years of age and older may receive a single dose of RSV vaccine using shared clinical decision-making (SCDM)   Tetanus (Td) Vaccine - COST NOT COVERED BY MEDICARE PART B Following completion of primary series, a booster dose should be given every 10 years to maintain immunity against tetanus. Td may also be given as tetanus wound prophylaxis.   Tdap Vaccine - COST NOT COVERED BY MEDICARE PART B Recommended at least once for all adults. For pregnant patients, recommended with each pregnancy.   Shingles Vaccine (Shingrix) - COST NOT COVERED BY MEDICARE PART B  2 shot series recommended in those 19 years and older who have or will have weakened immune systems or those 50 years and older     Health Maintenance Due:      Topic Date Due    Breast Cancer Screening: Mammogram  09/06/2024    Colorectal Cancer Screening  03/13/2028    Hepatitis C Screening  Completed     Immunizations Due:      Topic Date Due    COVID-19 Vaccine (3 - 2023-24 season) 09/01/2023    Influenza Vaccine (1) 09/01/2024     Advance Directives   What are advance directives?  Advance directives  are legal documents that state your wishes and plans for medical care. These plans are made ahead of time in case you lose your ability to make decisions for yourself. Advance directives can apply to any medical decision, such as the treatments you want, and if you want to donate organs.   What are the types of advance directives?  There are many types of advance directives, and each state has rules about how to use them. You may choose a combination of any of the following:  Living will:  This is a written record of the treatment you want. You can also choose which treatments you do not want, which to limit, and which to stop at a certain time. This includes surgery, medicine, IV fluid, and tube feedings.   Durable power of  for healthcare (DPAHC):  This is a written record that states who you want to make healthcare choices for you when you are unable to make them for yourself. This person, called a proxy, is usually a family member or a friend. You may choose more than 1 proxy.  Do not resuscitate (DNR) order:  A DNR order is used in case your heart stops beating or you stop breathing. It is a request not to have certain forms of treatment, such as CPR. A DNR order may be included in other types of advance directives.  Medical directive:  This covers the care that you want if you are in a coma, near death, or unable to make decisions for yourself. You can list the treatments you want for each condition. Treatment may include pain medicine, surgery, blood transfusions, dialysis, IV or tube feedings, and a ventilator (breathing machine).  Values history:  This document has questions about your views, beliefs, and how you feel and think about life. This information can help others choose the care that you would choose.  Why are advance directives important?  An advance directive helps you control your care. Although spoken wishes may be used, it is better to have your wishes written down. Spoken wishes can  be misunderstood, or not followed. Treatments may be given even if you do not want them. An advance directive may make it easier for your family to make difficult choices about your care.   Weight Management   Why it is important to manage your weight:  Being overweight increases your risk of health conditions such as heart disease, high blood pressure, type 2 diabetes, and certain types of cancer. It can also increase your risk for osteoarthritis, sleep apnea, and other respiratory problems. Aim for a slow, steady weight loss. Even a small amount of weight loss can lower your risk of health problems.  How to lose weight safely:  A safe and healthy way to lose weight is to eat fewer calories and get regular exercise. You can lose up about 1 pound a week by decreasing the number of calories you eat by 500 calories each day.   Healthy meal plan for weight management:  A healthy meal plan includes a variety of foods, contains fewer calories, and helps you stay healthy. A healthy meal plan includes the following:  Eat whole-grain foods more often.  A healthy meal plan should contain fiber. Fiber is the part of grains, fruits, and vegetables that is not broken down by your body. Whole-grain foods are healthy and provide extra fiber in your diet. Some examples of whole-grain foods are whole-wheat breads and pastas, oatmeal, brown rice, and bulgur.  Eat a variety of vegetables every day.  Include dark, leafy greens such as spinach, kale, ernestine greens, and mustard greens. Eat yellow and orange vegetables such as carrots, sweet potatoes, and winter squash.   Eat a variety of fruits every day.  Choose fresh or canned fruit (canned in its own juice or light syrup) instead of juice. Fruit juice has very little or no fiber.  Eat low-fat dairy foods.  Drink fat-free (skim) milk or 1% milk. Eat fat-free yogurt and low-fat cottage cheese. Try low-fat cheeses such as mozzarella and other reduced-fat cheeses.  Choose meat and other  protein foods that are low in fat.  Choose beans or other legumes such as split peas or lentils. Choose fish, skinless poultry (chicken or turkey), or lean cuts of red meat (beef or pork). Before you cook meat or poultry, cut off any visible fat.   Use less fat and oil.  Try baking foods instead of frying them. Add less fat, such as margarine, sour cream, regular salad dressing and mayonnaise to foods. Eat fewer high-fat foods. Some examples of high-fat foods include french fries, doughnuts, ice cream, and cakes.  Eat fewer sweets.  Limit foods and drinks that are high in sugar. This includes candy, cookies, regular soda, and sweetened drinks.  Exercise:  Exercise at least 30 minutes per day on most days of the week. Some examples of exercise include walking, biking, dancing, and swimming. You can also fit in more physical activity by taking the stairs instead of the elevator or parking farther away from stores. Ask your healthcare provider about the best exercise plan for you.      © Copyright [employer] 2018 Information is for End User's use only and may not be sold, redistributed or otherwise used for commercial purposes. All illustrations and images included in CareNotes® are the copyrighted property of [employer] Inc[employer] or [employer]       No

## 2024-07-03 LAB
ALBUMIN SERPL BCG-MCNC: 3.9 G/DL (ref 3.5–5)
ALP SERPL-CCNC: 103 U/L (ref 34–104)
ALT SERPL W P-5'-P-CCNC: 7 U/L (ref 7–52)
ANION GAP SERPL CALCULATED.3IONS-SCNC: 13 MMOL/L (ref 4–13)
AST SERPL W P-5'-P-CCNC: 10 U/L (ref 13–39)
BILIRUB SERPL-MCNC: 0.54 MG/DL (ref 0.2–1)
BUN SERPL-MCNC: 11 MG/DL (ref 5–25)
CALCIUM SERPL-MCNC: 9.3 MG/DL (ref 8.4–10.2)
CHLORIDE SERPL-SCNC: 101 MMOL/L (ref 96–108)
CHOLEST SERPL-MCNC: 134 MG/DL
CO2 SERPL-SCNC: 27 MMOL/L (ref 21–32)
CREAT SERPL-MCNC: 0.72 MG/DL (ref 0.6–1.3)
GFR SERPL CREATININE-BSD FRML MDRD: 86 ML/MIN/1.73SQ M
GLUCOSE P FAST SERPL-MCNC: 123 MG/DL (ref 65–99)
HDLC SERPL-MCNC: 48 MG/DL
LDLC SERPL CALC-MCNC: 64 MG/DL (ref 0–100)
POTASSIUM SERPL-SCNC: 4.2 MMOL/L (ref 3.5–5.3)
PROT SERPL-MCNC: 7.4 G/DL (ref 6.4–8.4)
SODIUM SERPL-SCNC: 141 MMOL/L (ref 135–147)
TRIGL SERPL-MCNC: 110 MG/DL

## 2024-07-23 ENCOUNTER — TELEPHONE (OUTPATIENT)
Dept: FAMILY MEDICINE CLINIC | Facility: HOSPITAL | Age: 69
End: 2024-07-23

## 2024-07-23 DIAGNOSIS — I10 BENIGN ESSENTIAL HYPERTENSION: ICD-10-CM

## 2024-07-23 DIAGNOSIS — K21.9 GASTROESOPHAGEAL REFLUX DISEASE WITHOUT ESOPHAGITIS: ICD-10-CM

## 2024-07-23 DIAGNOSIS — M17.0 PRIMARY OSTEOARTHRITIS OF BOTH KNEES: ICD-10-CM

## 2024-07-23 DIAGNOSIS — E03.9 ACQUIRED HYPOTHYROIDISM: ICD-10-CM

## 2024-07-23 DIAGNOSIS — E11.9 TYPE 2 DIABETES MELLITUS WITHOUT COMPLICATION, WITHOUT LONG-TERM CURRENT USE OF INSULIN (HCC): ICD-10-CM

## 2024-07-23 RX ORDER — AMLODIPINE BESYLATE 5 MG/1
5 TABLET ORAL DAILY
Qty: 90 TABLET | Refills: 3 | Status: SHIPPED | OUTPATIENT
Start: 2024-07-23

## 2024-07-23 RX ORDER — METOPROLOL TARTRATE 50 MG/1
50 TABLET, FILM COATED ORAL 2 TIMES DAILY
Qty: 180 TABLET | Refills: 3 | Status: SHIPPED | OUTPATIENT
Start: 2024-07-23

## 2024-07-23 RX ORDER — OMEPRAZOLE 20 MG/1
20 CAPSULE, DELAYED RELEASE ORAL DAILY
Qty: 90 CAPSULE | Refills: 3 | Status: SHIPPED | OUTPATIENT
Start: 2024-07-23

## 2024-07-23 RX ORDER — TRAMADOL HYDROCHLORIDE 50 MG/1
50 TABLET ORAL 2 TIMES DAILY PRN
Qty: 60 TABLET | Refills: 3 | Status: SHIPPED | OUTPATIENT
Start: 2024-07-23

## 2024-07-23 RX ORDER — VALSARTAN AND HYDROCHLOROTHIAZIDE 160; 25 MG/1; MG/1
1 TABLET ORAL DAILY
Qty: 90 TABLET | Refills: 3 | Status: SHIPPED | OUTPATIENT
Start: 2024-07-23

## 2024-07-23 RX ORDER — LEVOTHYROXINE SODIUM 0.1 MG/1
100 TABLET ORAL DAILY
Qty: 90 TABLET | Refills: 3 | Status: SHIPPED | OUTPATIENT
Start: 2024-07-23

## 2024-07-23 RX ORDER — SIMVASTATIN 20 MG
20 TABLET ORAL DAILY
Qty: 90 TABLET | Refills: 3 | Status: SHIPPED | OUTPATIENT
Start: 2024-07-23

## 2024-07-24 ENCOUNTER — TELEPHONE (OUTPATIENT)
Dept: FAMILY MEDICINE CLINIC | Facility: HOSPITAL | Age: 69
End: 2024-07-24

## 2024-07-28 NOTE — TELEPHONE ENCOUNTER
PA for traMADol HCl 50MG tablets    Submitted via    [x]CMM-KEY -[id number]   [][email or web address]-Case ID #   []Faxed to plan   []Other website   []Phone call Case ID #     Office notes sent, clinical questions answered. Awaiting determination    Turnaround time for your insurance to make a decision on your Prior Authorization can take 7-21 business days.

## 2024-08-06 ENCOUNTER — TELEPHONE (OUTPATIENT)
Dept: FAMILY MEDICINE CLINIC | Facility: HOSPITAL | Age: 69
End: 2024-08-06

## 2024-08-06 DIAGNOSIS — E78.00 PURE HYPERCHOLESTEROLEMIA: ICD-10-CM

## 2024-08-06 DIAGNOSIS — E66.09 CLASS 2 OBESITY DUE TO EXCESS CALORIES WITHOUT SERIOUS COMORBIDITY WITH BODY MASS INDEX (BMI) OF 36.0 TO 36.9 IN ADULT: ICD-10-CM

## 2024-08-06 DIAGNOSIS — E11.9 TYPE 2 DIABETES MELLITUS WITHOUT COMPLICATION, WITHOUT LONG-TERM CURRENT USE OF INSULIN (HCC): ICD-10-CM

## 2024-08-06 DIAGNOSIS — I10 BENIGN ESSENTIAL HYPERTENSION: ICD-10-CM

## 2024-08-06 NOTE — TELEPHONE ENCOUNTER
Medication: tirzepatide (Mounjaro) 2.5 MG/0.5ML     Dose/Frequency: Inject 0.5 mL (2.5 mg total) under the skin every 7 days     Quantity: 2 ml    Pharmacy:  [id number] [place]     Office:   [x] PCP/Provider -   [] Speciality/Provider -     Does the patient have enough for 3 days?   [] Yes   [] No - Send as HP to POD

## 2024-08-13 ENCOUNTER — TELEPHONE (OUTPATIENT)
Age: 69
End: 2024-08-13

## 2024-08-13 NOTE — TELEPHONE ENCOUNTER
PA for Mounjaro 2.5mg not required     Reason (screenshot if applicable)          Patient advised by          [] MyChart Message  [x] Phone call- Call was disconnected by patient- unable to give information on auth  []LMOM  []L/M to call office as no active Communication consent on file  []Unable to leave detailed message as VM not approved on Communication consent       Pharmacy advised by    [x]Fax  []Phone call

## 2024-08-13 NOTE — TELEPHONE ENCOUNTER
PA for Mounjaro 2.5mg SUBMITTED     via    [x]CMM-KEY: [id number]  [][employer]-Case ID #   []Faxed to plan   []Other website   []Phone call Case ID #     Office notes sent, clinical questions answered. Awaiting determination    Turnaround time for your insurance to make a decision on your Prior Authorization can take 7-21 business days.

## 2024-08-21 ENCOUNTER — TELEPHONE (OUTPATIENT)
Dept: FAMILY MEDICINE CLINIC | Facility: HOSPITAL | Age: 69
End: 2024-08-21

## 2024-08-21 DIAGNOSIS — E11.9 TYPE 2 DIABETES MELLITUS WITHOUT COMPLICATION, WITHOUT LONG-TERM CURRENT USE OF INSULIN (HCC): Primary | ICD-10-CM

## 2024-08-21 RX ORDER — BLOOD-GLUCOSE METER
EACH MISCELLANEOUS 4 TIMES DAILY
Qty: 1 KIT | Refills: 0 | Status: SHIPPED | OUTPATIENT
Start: 2024-08-21

## 2024-08-21 NOTE — TELEPHONE ENCOUNTER
Pharmacy had called in stating that the instructions and what was sent in for the new device, were unclear.     The pharmacy needs a new script that states the patient needs a whole new device.     Please advise.

## 2024-08-21 NOTE — TELEPHONE ENCOUNTER
EFRAIN IS MISSING THE FINGER PRICKER FOR HER ONE TOUCH VERIO REFLECT - IS SHE ABLE TO JUST GET THE PRICKER OR DOES SHE NEED A SCRIPT FOR A COMPLETELY NEW DEVICE    [place] [place]

## 2024-08-22 NOTE — TELEPHONE ENCOUNTER
Patient called in regards to glucose device.  Informed waiting for provider clarification to pharmacy.  Patient also had question in regards to Mounjaro.  Patient wanted to know if dosage will increase over time.  Please contact patient.

## 2024-08-23 NOTE — TELEPHONE ENCOUNTER
Pt notified per dr craft to call when next refill is due and dose can be bumped up if she is doing well and tolerating med. CR

## 2024-09-05 ENCOUNTER — TELEPHONE (OUTPATIENT)
Dept: FAMILY MEDICINE CLINIC | Facility: HOSPITAL | Age: 69
End: 2024-09-05

## 2024-09-05 DIAGNOSIS — E66.09 CLASS 2 OBESITY DUE TO EXCESS CALORIES WITHOUT SERIOUS COMORBIDITY WITH BODY MASS INDEX (BMI) OF 36.0 TO 36.9 IN ADULT: ICD-10-CM

## 2024-09-05 DIAGNOSIS — E11.9 TYPE 2 DIABETES MELLITUS WITHOUT COMPLICATION, WITHOUT LONG-TERM CURRENT USE OF INSULIN (HCC): Primary | ICD-10-CM

## 2024-09-05 NOTE — TELEPHONE ENCOUNTER
EFRAIN NEEDS A REFILL ON HER MOUNJARO  SHE IS TAKING 2.5MG. BUT SHE SAID IT IS SUPPOSE TO BE INCREASED     [facility] [place]    NEEDS THIS BY MONDAY

## 2024-09-16 ENCOUNTER — OFFICE VISIT (OUTPATIENT)
Dept: FAMILY MEDICINE CLINIC | Facility: HOSPITAL | Age: 69
End: 2024-09-16
Payer: MEDICARE

## 2024-09-16 VITALS
WEIGHT: 167 LBS | OXYGEN SATURATION: 98 % | HEIGHT: 61 IN | SYSTOLIC BLOOD PRESSURE: 110 MMHG | DIASTOLIC BLOOD PRESSURE: 60 MMHG | TEMPERATURE: 97.8 F | RESPIRATION RATE: 16 BRPM | BODY MASS INDEX: 31.53 KG/M2 | HEART RATE: 71 BPM

## 2024-09-16 DIAGNOSIS — I10 BENIGN ESSENTIAL HYPERTENSION: ICD-10-CM

## 2024-09-16 DIAGNOSIS — U07.1 COVID-19: Primary | ICD-10-CM

## 2024-09-16 DIAGNOSIS — R09.89 CHEST CONGESTION: ICD-10-CM

## 2024-09-16 DIAGNOSIS — R05.9 COUGH, UNSPECIFIED TYPE: ICD-10-CM

## 2024-09-16 LAB
SARS-COV-2 AG UPPER RESP QL IA: POSITIVE
VALID CONTROL: ABNORMAL

## 2024-09-16 PROCEDURE — G2211 COMPLEX E/M VISIT ADD ON: HCPCS | Performed by: NURSE PRACTITIONER

## 2024-09-16 PROCEDURE — 99213 OFFICE O/P EST LOW 20 MIN: CPT | Performed by: NURSE PRACTITIONER

## 2024-09-16 PROCEDURE — 87811 SARS-COV-2 COVID19 W/OPTIC: CPT | Performed by: NURSE PRACTITIONER

## 2024-09-16 RX ORDER — BENZONATATE 200 MG/1
200 CAPSULE ORAL 3 TIMES DAILY PRN
Qty: 20 CAPSULE | Refills: 0 | Status: SHIPPED | OUTPATIENT
Start: 2024-09-16

## 2024-09-16 NOTE — PATIENT INSTRUCTIONS
Continue supportive care.  Cough syrup tessalon perles   Mask while around others up to 10days after symptom onset.    Please reach out if symptoms start to worsen

## 2024-09-16 NOTE — ASSESSMENT & PLAN NOTE
Well-controlled on valsartan/hydrochlorothiazide 160/25 mg daily, Lopressor 50 mg every 12hr, amlodipine 5 mg.  Electrolyte and kidney function stable in July 2024.

## 2024-09-16 NOTE — PROGRESS NOTES
"[facility]   Clary GARCIA    Assessment/Plan:   1. COVID-19  -     benzonatate (TESSALON) 200 MG capsule; Take 1 capsule (200 mg total) by mouth 3 (three) times a day as needed for cough  2. Benign essential hypertension  Assessment & Plan:  Well-controlled on valsartan/hydrochlorothiazide 160/25 mg daily, Lopressor 50 mg every 12hr, amlodipine 5 mg.  Electrolyte and kidney function stable in July 2024.  3. Cough, unspecified type  -     POCT Rapid Covid Ag  4. Chest congestion  -     POCT Rapid Covid Ag      Continue supportive care.  Cough syrup tessalon perles   Mask while around others up to 10days after symptom onset.    Please reach out if symptoms start to worsen  Return for Next scheduled follow up.  Patient may call or return to office with any questions or concerns.     ______________________________________________________________________  Subjective:     Patient ID: Beronica Villalpando is a 69 y.o. female.  Beronica Villalpando  No chief complaint on file.    Started with a sore throat Wednesday night then woke up with an intermittent fever that lasted over the weekend, Tmax 102.  Tested negative for COVID on Wednesday.  Associated symptoms of fatigue, feeling out of it foggy, coughing and congestion.  Minor rhinorrhea.  Causing sleep disturbance.  Cough is nonproductive.  Appetite is good she staying hydrated.  Blood sugars controlled.  She did not take any medications she just \"slept it off\".  Starting to feel better.  Reports her grandkids are now sick as well.                 The following portions of the patient's history were reviewed and updated as appropriate: allergies, current medications, past family history, past medical history, past social history, past surgical history, and problem list.    Review of Systems   Constitutional:  Positive for fatigue. Negative for activity change, appetite change, chills and fever.   HENT:  Positive for congestion. Negative for ear pain, " postnasal drip and sinus pain.    Eyes: Negative.    Respiratory:  Positive for cough. Negative for shortness of breath.    Cardiovascular: Negative.  Negative for chest pain and leg swelling.   Gastrointestinal: Negative.  Negative for constipation and diarrhea.   Endocrine: Negative.    Genitourinary: Negative.  Negative for difficulty urinating and dysuria.   Musculoskeletal:  Positive for back pain.        Thoracic back pain from coughing.   Skin: Negative.    Allergic/Immunologic: Negative.  Negative for immunocompromised state.   Neurological: Negative.  Negative for dizziness and light-headedness.   Hematological: Negative.    Psychiatric/Behavioral:  Positive for sleep disturbance.          Objective:      Vitals:    09/16/24 1222   BP: 110/60   Pulse: 71   Resp: 16   Temp: 97.8 °F (36.6 °C)   SpO2: 98%      Physical Exam  Vitals and nursing note reviewed.   Constitutional:       Appearance: Normal appearance. She is obese.   HENT:      Head: Normocephalic and atraumatic.      Right Ear: Ear canal and external ear normal. A middle ear effusion is present.      Left Ear: Ear canal and external ear normal. A middle ear effusion is present.      Nose: Congestion and rhinorrhea present.      Mouth/Throat:      Mouth: Mucous membranes are moist.      Pharynx: Oropharynx is clear. Postnasal drip present.   Eyes:      Extraocular Movements: Extraocular movements intact.      Conjunctiva/sclera: Conjunctivae normal.      Pupils: Pupils are equal, round, and reactive to light.   Cardiovascular:      Rate and Rhythm: Normal rate and regular rhythm.      Pulses: Normal pulses.      Heart sounds: Normal heart sounds.   Pulmonary:      Effort: Pulmonary effort is normal.      Breath sounds: Normal breath sounds.      Comments: No pain with inspiration.  Mild dry cough x 1 on exam.  Abdominal:      General: Bowel sounds are normal.      Palpations: Abdomen is soft.   Musculoskeletal:         General: Normal range of  "motion.      Cervical back: Normal range of motion and neck supple.      Right lower leg: No edema.      Left lower leg: No edema.   Skin:     General: Skin is warm and dry.   Neurological:      General: No focal deficit present.      Mental Status: She is alert and oriented to person, place, and time.   Psychiatric:         Mood and Affect: Mood normal.         Behavior: Behavior normal.         Thought Content: Thought content normal.         Judgment: Judgment normal.           Portions of the record may have been created with voice recognition software. Occasional wrong word or \"sound alike\" substitutions may have occurred due to the inherent limitations of voice recognition software. Please review the chart carefully and recognize, using context, where substitutions/typographical errors may have occurred.       "

## 2024-09-18 ENCOUNTER — RA CDI HCC (OUTPATIENT)
Dept: OTHER | Facility: HOSPITAL | Age: 69
End: 2024-09-18

## 2024-10-02 ENCOUNTER — OFFICE VISIT (OUTPATIENT)
Dept: FAMILY MEDICINE CLINIC | Facility: HOSPITAL | Age: 69
End: 2024-10-02
Payer: MEDICARE

## 2024-10-02 VITALS
HEART RATE: 72 BPM | OXYGEN SATURATION: 99 % | SYSTOLIC BLOOD PRESSURE: 110 MMHG | DIASTOLIC BLOOD PRESSURE: 68 MMHG | HEIGHT: 61 IN | WEIGHT: 166.8 LBS | BODY MASS INDEX: 31.49 KG/M2

## 2024-10-02 DIAGNOSIS — E78.00 PURE HYPERCHOLESTEROLEMIA: Primary | ICD-10-CM

## 2024-10-02 DIAGNOSIS — D72.829 LEUKOCYTOSIS, UNSPECIFIED TYPE: ICD-10-CM

## 2024-10-02 DIAGNOSIS — E03.9 ACQUIRED HYPOTHYROIDISM: ICD-10-CM

## 2024-10-02 DIAGNOSIS — E66.09 CLASS 2 OBESITY DUE TO EXCESS CALORIES WITHOUT SERIOUS COMORBIDITY WITH BODY MASS INDEX (BMI) OF 36.0 TO 36.9 IN ADULT: ICD-10-CM

## 2024-10-02 DIAGNOSIS — M17.0 PRIMARY OSTEOARTHRITIS OF BOTH KNEES: ICD-10-CM

## 2024-10-02 DIAGNOSIS — E66.812 CLASS 2 OBESITY DUE TO EXCESS CALORIES WITHOUT SERIOUS COMORBIDITY WITH BODY MASS INDEX (BMI) OF 36.0 TO 36.9 IN ADULT: ICD-10-CM

## 2024-10-02 DIAGNOSIS — E11.9 TYPE 2 DIABETES MELLITUS WITHOUT COMPLICATION, WITHOUT LONG-TERM CURRENT USE OF INSULIN (HCC): ICD-10-CM

## 2024-10-02 LAB — SL AMB POCT HEMOGLOBIN AIC: 5.8 (ref ?–6.5)

## 2024-10-02 PROCEDURE — 99214 OFFICE O/P EST MOD 30 MIN: CPT | Performed by: STUDENT IN AN ORGANIZED HEALTH CARE EDUCATION/TRAINING PROGRAM

## 2024-10-02 PROCEDURE — 83036 HEMOGLOBIN GLYCOSYLATED A1C: CPT | Performed by: STUDENT IN AN ORGANIZED HEALTH CARE EDUCATION/TRAINING PROGRAM

## 2024-10-02 RX ORDER — TRAMADOL HYDROCHLORIDE 50 MG/1
50 TABLET ORAL DAILY PRN
Qty: 30 TABLET | Refills: 0 | Status: SHIPPED | OUTPATIENT
Start: 2024-10-02

## 2024-10-02 NOTE — PROGRESS NOTES
[facility]   Clare Munroe DO    Assessment/Plan:      Diagnosis ICD-10-CM Associated Orders   1. Pure hypercholesterolemia  E78.00 Lipid Panel with Direct LDL reflex      2. Type 2 diabetes mellitus without complication, without long-term current use of insulin (HCC)  E11.9 tirzepatide (Mounjaro) 5 MG/0.5ML     POCT hemoglobin A1c     Hemoglobin A1C     Basic metabolic panel      3. Class 2 obesity due to excess calories without serious comorbidity with body mass index (BMI) of 36.0 to 36.9 in adult  E66.812 tirzepatide (Mounjaro) 5 MG/0.5ML    E66.09 POCT hemoglobin A1c    Z68.36       4. Primary osteoarthritis of both knees  M17.0 traMADol (ULTRAM) 50 mg tablet      5. Leukocytosis, unspecified type  D72.829 CBC and differential      6. Acquired hypothyroidism  E03.9 TSH, 3rd generation        Next stet of labs ordered.   Let me know if feeling bad due to mounjaro or levothyroxine dose.   Try tramadol prn pain in knees & low back. XR's done prior.   D/C norvasc - Will STOP due to weight loss, recheck home BP's & at next visit. Goal BP < 130/80  Return in about 4 months (around 2/2/2025) for F/U Chronic DX, RVW Labs .  Patient may call or return to office with any questions or concerns.   ______________________________________________________________________  Subjective:     Patient ID: Beronica Villalpando is a 69 y.o. female.  HPI  Beronica Villalpando  Chief Complaint   Patient presents with    Follow-up     Recently sick with illness, one wee, not eating too much.     Smaller portions.   Admits to less meals per day.   Not a huge BF person, but now not ever.   Tuesday does injections. Not feeling nauseous.     Lab Results   Component Value Date    HGBA1C 5.8 10/02/2024     A1c prior 6.9 in July.     191 highest weight, now down 25 lbs.   Mounjaro working well, no constipation.     Not recent home BP checks.     Wt Readings from Last 3 Encounters:   10/02/24 75.7 kg (166 lb 12.8 oz)   09/16/24  75.8 kg (167 lb)   07/02/24 86.6 kg (191 lb)     Temp Readings from Last 3 Encounters:   09/16/24 97.8 °F (36.6 °C) (Tympanic)   12/19/23 97.9 °F (36.6 °C)   03/15/23 98.2 °F (36.8 °C) (Temporal)     BP Readings from Last 3 Encounters:   10/02/24 110/68   09/16/24 110/60   07/02/24 130/80     Pulse Readings from Last 3 Encounters:   10/02/24 72   09/16/24 71   07/02/24 70      The following portions of the patient's history were reviewed and updated as appropriate: allergies, current medications, past medical history, and problem list.    Review of Systems   Constitutional:  Negative for chills and fever.   Respiratory:  Negative for cough and shortness of breath.    Cardiovascular:  Negative for chest pain and palpitations.   Neurological:  Negative for dizziness, light-headedness and headaches.       Objective:      Vitals:    10/02/24 0949   BP: 110/68   Pulse: 72   SpO2: 99%      Physical Exam  Vitals and nursing note reviewed.   Constitutional:       General: She is not in acute distress.     Appearance: Normal appearance. She is obese. She is not ill-appearing.   HENT:      Head: Normocephalic and atraumatic.   Eyes:      General: No scleral icterus.        Right eye: No discharge.         Left eye: No discharge.   Cardiovascular:      Rate and Rhythm: Normal rate and regular rhythm.      Pulses: Normal pulses.      Heart sounds: Normal heart sounds. No murmur heard.  Pulmonary:      Effort: Pulmonary effort is normal. No respiratory distress.      Breath sounds: Normal breath sounds. No stridor. No wheezing.   Musculoskeletal:         General: Tenderness (low back & knees b/l) present.      Cervical back: Normal range of motion and neck supple. No rigidity.      Right lower leg: No edema.      Left lower leg: No edema.   Neurological:      Mental Status: She is alert and oriented to person, place, and time.      Gait: Gait normal.   Psychiatric:         Mood and Affect: Mood normal.         Behavior: Behavior  "normal.         Thought Content: Thought content normal.         Judgment: Judgment normal.           Portions of the record may have been created with voice recognition software. Occasional wrong word or \"sound alike\" substitutions may have occurred due to the inherent limitations of voice recognition software. Please review the chart carefully and recognize, using context, where substitutions/typographical errors may have occurred.     "

## 2024-10-14 DIAGNOSIS — E11.9 TYPE 2 DIABETES MELLITUS WITHOUT COMPLICATION, WITHOUT LONG-TERM CURRENT USE OF INSULIN (HCC): ICD-10-CM

## 2024-10-15 RX ORDER — BLOOD SUGAR DIAGNOSTIC
STRIP MISCELLANEOUS
Qty: 400 STRIP | Refills: 1 | Status: SHIPPED | OUTPATIENT
Start: 2024-10-15

## 2024-10-18 ENCOUNTER — IMMUNIZATIONS (OUTPATIENT)
Dept: FAMILY MEDICINE CLINIC | Facility: HOSPITAL | Age: 69
End: 2024-10-18
Payer: MEDICARE

## 2024-10-18 DIAGNOSIS — Z23 ENCOUNTER FOR IMMUNIZATION: Primary | ICD-10-CM

## 2024-10-18 PROCEDURE — G0008 ADMIN INFLUENZA VIRUS VAC: HCPCS

## 2024-10-18 PROCEDURE — 90662 IIV NO PRSV INCREASED AG IM: CPT

## 2024-10-31 ENCOUNTER — TELEPHONE (OUTPATIENT)
Dept: FAMILY MEDICINE CLINIC | Facility: HOSPITAL | Age: 69
End: 2024-10-31

## 2024-11-01 DIAGNOSIS — E66.812 CLASS 2 OBESITY DUE TO EXCESS CALORIES WITHOUT SERIOUS COMORBIDITY WITH BODY MASS INDEX (BMI) OF 36.0 TO 36.9 IN ADULT: ICD-10-CM

## 2024-11-01 DIAGNOSIS — E11.9 TYPE 2 DIABETES MELLITUS WITHOUT COMPLICATION, WITHOUT LONG-TERM CURRENT USE OF INSULIN (HCC): Primary | ICD-10-CM

## 2024-11-01 DIAGNOSIS — E66.09 CLASS 2 OBESITY DUE TO EXCESS CALORIES WITHOUT SERIOUS COMORBIDITY WITH BODY MASS INDEX (BMI) OF 36.0 TO 36.9 IN ADULT: ICD-10-CM

## 2024-11-01 RX ORDER — TIRZEPATIDE 7.5 MG/.5ML
7.5 INJECTION, SOLUTION SUBCUTANEOUS WEEKLY
Qty: 2 ML | Refills: 0 | Status: SHIPPED | OUTPATIENT
Start: 2024-11-01

## 2024-11-15 LAB
LEFT EYE DIABETIC RETINOPATHY: NORMAL
RIGHT EYE DIABETIC RETINOPATHY: POSITIVE

## 2024-12-02 ENCOUNTER — TELEPHONE (OUTPATIENT)
Dept: FAMILY MEDICINE CLINIC | Facility: HOSPITAL | Age: 69
End: 2024-12-02

## 2024-12-02 DIAGNOSIS — E11.9 TYPE 2 DIABETES MELLITUS WITHOUT COMPLICATION, WITHOUT LONG-TERM CURRENT USE OF INSULIN (HCC): ICD-10-CM

## 2024-12-02 DIAGNOSIS — E66.812 CLASS 2 OBESITY DUE TO EXCESS CALORIES WITHOUT SERIOUS COMORBIDITY WITH BODY MASS INDEX (BMI) OF 36.0 TO 36.9 IN ADULT: ICD-10-CM

## 2024-12-02 DIAGNOSIS — E66.09 CLASS 2 OBESITY DUE TO EXCESS CALORIES WITHOUT SERIOUS COMORBIDITY WITH BODY MASS INDEX (BMI) OF 36.0 TO 36.9 IN ADULT: ICD-10-CM

## 2024-12-02 RX ORDER — TIRZEPATIDE 7.5 MG/.5ML
7.5 INJECTION, SOLUTION SUBCUTANEOUS WEEKLY
Qty: 2 ML | Refills: 0 | Status: SHIPPED | OUTPATIENT
Start: 2024-12-02

## 2024-12-03 ENCOUNTER — TELEPHONE (OUTPATIENT)
Dept: FAMILY MEDICINE CLINIC | Facility: HOSPITAL | Age: 69
End: 2024-12-03

## 2024-12-03 NOTE — TELEPHONE ENCOUNTER
Patient called in regards to Mounjaro PA.  Patient is very upset stating that PA was not needed before and she will be out of medication today.  Please contact patient when PA approved.

## 2024-12-03 NOTE — TELEPHONE ENCOUNTER
PA for Mounjaro 7.5mg SUBMITTED to [employer]    via    [x]CMM-KEY: [id number]  []Surescripts-Case ID #   []Availity-Auth ID #   []Faxed to plan   []Other website   []Phone call Case ID #     [x]PA sent as URGENT    All office notes, labs and other pertaining documents and studies sent. Clinical questions answered. Awaiting determination from insurance company.     Turnaround time for your insurance to make a decision on your Prior Authorization can take 7-21 business days.

## 2024-12-04 DIAGNOSIS — E66.812 CLASS 2 OBESITY DUE TO EXCESS CALORIES WITHOUT SERIOUS COMORBIDITY WITH BODY MASS INDEX (BMI) OF 36.0 TO 36.9 IN ADULT: ICD-10-CM

## 2024-12-04 DIAGNOSIS — E66.09 CLASS 2 OBESITY DUE TO EXCESS CALORIES WITHOUT SERIOUS COMORBIDITY WITH BODY MASS INDEX (BMI) OF 36.0 TO 36.9 IN ADULT: ICD-10-CM

## 2024-12-04 DIAGNOSIS — E11.9 TYPE 2 DIABETES MELLITUS WITHOUT COMPLICATION, WITHOUT LONG-TERM CURRENT USE OF INSULIN (HCC): Primary | ICD-10-CM

## 2024-12-04 RX ORDER — TIRZEPATIDE 5 MG/.5ML
5 INJECTION, SOLUTION SUBCUTANEOUS WEEKLY
Qty: 2 ML | Refills: 0 | Status: SHIPPED | OUTPATIENT
Start: 2024-12-04

## 2024-12-05 ENCOUNTER — TELEPHONE (OUTPATIENT)
Dept: FAMILY MEDICINE CLINIC | Facility: HOSPITAL | Age: 69
End: 2024-12-05

## 2024-12-05 NOTE — TELEPHONE ENCOUNTER
PA for Mounjaro 7.5mg NOT REQUIRED- Pharmacist states that pt's medicare is not requiring an auth it is the PACE program that requires- [facility] does not handle PACE auths. Please advise the patient.     Reason (screenshot if applicable)          Patient advised by          [] MyChart Message  [] Phone call   []LMOM  []L/M to call office as no active Communication consent on file  []Unable to leave detailed message as VM not approved on Communication consent       Pharmacy advised by    []Fax  [x]Phone call- Pharmacist states that pt's medicare is not requiring an auth it is the PACE program that requires- [facility] does not handle PACE auths. Please advise the patient.

## 2024-12-06 ENCOUNTER — TELEPHONE (OUTPATIENT)
Dept: FAMILY MEDICINE CLINIC | Facility: HOSPITAL | Age: 69
End: 2024-12-06

## 2024-12-06 NOTE — TELEPHONE ENCOUNTER
PA was done in office. The CMM form is for PACE and the PACE form was completed in office and sent to [employer]. No further action needed from PA team.

## 2024-12-27 ENCOUNTER — TELEPHONE (OUTPATIENT)
Dept: FAMILY MEDICINE CLINIC | Facility: HOSPITAL | Age: 69
End: 2024-12-27

## 2024-12-27 NOTE — TELEPHONE ENCOUNTER
Beronica BERRY said when she started on monjauro in July she weighed 191lbs. Now she is weighing 143 - she feels fine - but she said it seems like it's melting off and she does eat - not like she did before but she eats.  Asking will she eventually reach a plateau?  Should she be concerned something else is going on?  She has an appt. With you on 2/4/24 should she be seen sooner - should she have labs done?  Just a few questions

## 2025-01-07 ENCOUNTER — TELEPHONE (OUTPATIENT)
Dept: FAMILY MEDICINE CLINIC | Facility: HOSPITAL | Age: 70
End: 2025-01-07

## 2025-01-07 NOTE — TELEPHONE ENCOUNTER
Beronica needs a new script sent for her mounjaro - asking if Dr. Munroe wants to increase the dose?  Dr. Munroe asked about increasing last time        [facility]

## 2025-01-07 NOTE — TELEPHONE ENCOUNTER
Left detailed VM per Dr. Munroe pt should stay on the higher dose. Waiting for pt to call back to confirm    no weight-bearing restrictions

## 2025-01-10 DIAGNOSIS — E11.9 TYPE 2 DIABETES MELLITUS WITHOUT COMPLICATION, WITHOUT LONG-TERM CURRENT USE OF INSULIN (HCC): ICD-10-CM

## 2025-01-10 DIAGNOSIS — E66.812 CLASS 2 OBESITY DUE TO EXCESS CALORIES WITHOUT SERIOUS COMORBIDITY WITH BODY MASS INDEX (BMI) OF 36.0 TO 36.9 IN ADULT: ICD-10-CM

## 2025-01-10 DIAGNOSIS — E66.09 CLASS 2 OBESITY DUE TO EXCESS CALORIES WITHOUT SERIOUS COMORBIDITY WITH BODY MASS INDEX (BMI) OF 36.0 TO 36.9 IN ADULT: ICD-10-CM

## 2025-01-10 RX ORDER — TIRZEPATIDE 7.5 MG/.5ML
7.5 INJECTION, SOLUTION SUBCUTANEOUS WEEKLY
Qty: 2 ML | Refills: 0 | Status: SHIPPED | OUTPATIENT
Start: 2025-01-10

## 2025-02-06 DIAGNOSIS — E11.9 TYPE 2 DIABETES MELLITUS WITHOUT COMPLICATION, WITHOUT LONG-TERM CURRENT USE OF INSULIN (HCC): ICD-10-CM

## 2025-02-06 DIAGNOSIS — E66.09 CLASS 2 OBESITY DUE TO EXCESS CALORIES WITHOUT SERIOUS COMORBIDITY WITH BODY MASS INDEX (BMI) OF 36.0 TO 36.9 IN ADULT: ICD-10-CM

## 2025-02-06 DIAGNOSIS — E66.812 CLASS 2 OBESITY DUE TO EXCESS CALORIES WITHOUT SERIOUS COMORBIDITY WITH BODY MASS INDEX (BMI) OF 36.0 TO 36.9 IN ADULT: ICD-10-CM

## 2025-02-07 RX ORDER — TIRZEPATIDE 7.5 MG/.5ML
7.5 INJECTION, SOLUTION SUBCUTANEOUS WEEKLY
Qty: 2 ML | Refills: 0 | Status: SHIPPED | OUTPATIENT
Start: 2025-02-07

## 2025-02-12 ENCOUNTER — OFFICE VISIT (OUTPATIENT)
Dept: FAMILY MEDICINE CLINIC | Facility: HOSPITAL | Age: 70
End: 2025-02-12
Payer: MEDICARE

## 2025-02-12 ENCOUNTER — APPOINTMENT (OUTPATIENT)
Dept: LAB | Facility: HOSPITAL | Age: 70
End: 2025-02-12
Payer: MEDICARE

## 2025-02-12 VITALS
BODY MASS INDEX: 26.62 KG/M2 | WEIGHT: 141 LBS | HEIGHT: 61 IN | DIASTOLIC BLOOD PRESSURE: 68 MMHG | HEART RATE: 68 BPM | SYSTOLIC BLOOD PRESSURE: 110 MMHG | OXYGEN SATURATION: 97 %

## 2025-02-12 DIAGNOSIS — E78.00 PURE HYPERCHOLESTEROLEMIA: ICD-10-CM

## 2025-02-12 DIAGNOSIS — M75.51 SUBACROMIAL BURSITIS OF RIGHT SHOULDER JOINT: ICD-10-CM

## 2025-02-12 DIAGNOSIS — L72.3 SEBACEOUS CYST: ICD-10-CM

## 2025-02-12 DIAGNOSIS — M75.91 SUPRASPINATUS TENDINITIS, RIGHT: ICD-10-CM

## 2025-02-12 DIAGNOSIS — E11.9 TYPE 2 DIABETES MELLITUS WITHOUT COMPLICATION, WITHOUT LONG-TERM CURRENT USE OF INSULIN (HCC): Primary | ICD-10-CM

## 2025-02-12 DIAGNOSIS — E03.9 ACQUIRED HYPOTHYROIDISM: ICD-10-CM

## 2025-02-12 DIAGNOSIS — E11.9 TYPE 2 DIABETES MELLITUS WITHOUT COMPLICATION, WITHOUT LONG-TERM CURRENT USE OF INSULIN (HCC): ICD-10-CM

## 2025-02-12 DIAGNOSIS — E11.3293 MILD NONPROLIFERATIVE DIABETIC RETINOPATHY OF BOTH EYES WITHOUT MACULAR EDEMA ASSOCIATED WITH TYPE 2 DIABETES MELLITUS (HCC): ICD-10-CM

## 2025-02-12 DIAGNOSIS — D72.829 LEUKOCYTOSIS, UNSPECIFIED TYPE: ICD-10-CM

## 2025-02-12 DIAGNOSIS — E87.6 HYPOKALEMIA: ICD-10-CM

## 2025-02-12 PROBLEM — E66.01 OBESITY, MORBID (HCC): Status: RESOLVED | Noted: 2023-07-03 | Resolved: 2025-02-12

## 2025-02-12 LAB
BASOPHILS # BLD AUTO: 0.04 THOUSANDS/ΜL (ref 0–0.1)
BASOPHILS NFR BLD AUTO: 0 % (ref 0–1)
EOSINOPHIL # BLD AUTO: 0.19 THOUSAND/ΜL (ref 0–0.61)
EOSINOPHIL NFR BLD AUTO: 1 % (ref 0–6)
ERYTHROCYTE [DISTWIDTH] IN BLOOD BY AUTOMATED COUNT: 13.1 % (ref 11.6–15.1)
EST. AVERAGE GLUCOSE BLD GHB EST-MCNC: 108 MG/DL
HBA1C MFR BLD: 5.4 %
HCT VFR BLD AUTO: 38.1 % (ref 34.8–46.1)
HGB BLD-MCNC: 12.3 G/DL (ref 11.5–15.4)
IMM GRANULOCYTES # BLD AUTO: 0.05 THOUSAND/UL (ref 0–0.2)
IMM GRANULOCYTES NFR BLD AUTO: 0 % (ref 0–2)
LYMPHOCYTES # BLD AUTO: 2.97 THOUSANDS/ΜL (ref 0.6–4.47)
LYMPHOCYTES NFR BLD AUTO: 23 % (ref 14–44)
MCH RBC QN AUTO: 29.1 PG (ref 26.8–34.3)
MCHC RBC AUTO-ENTMCNC: 32.3 G/DL (ref 31.4–37.4)
MCV RBC AUTO: 90 FL (ref 82–98)
MONOCYTES # BLD AUTO: 0.77 THOUSAND/ΜL (ref 0.17–1.22)
MONOCYTES NFR BLD AUTO: 6 % (ref 4–12)
NEUTROPHILS # BLD AUTO: 9.12 THOUSANDS/ΜL (ref 1.85–7.62)
NEUTS SEG NFR BLD AUTO: 70 % (ref 43–75)
NRBC BLD AUTO-RTO: 0 /100 WBCS
PLATELET # BLD AUTO: 211 THOUSANDS/UL (ref 149–390)
PMV BLD AUTO: 12.2 FL (ref 8.9–12.7)
RBC # BLD AUTO: 4.23 MILLION/UL (ref 3.81–5.12)
TSH SERPL DL<=0.05 MIU/L-ACNC: 0.14 UIU/ML (ref 0.45–4.5)
WBC # BLD AUTO: 13.14 THOUSAND/UL (ref 4.31–10.16)

## 2025-02-12 PROCEDURE — 85025 COMPLETE CBC W/AUTO DIFF WBC: CPT

## 2025-02-12 PROCEDURE — 83036 HEMOGLOBIN GLYCOSYLATED A1C: CPT

## 2025-02-12 PROCEDURE — 80061 LIPID PANEL: CPT

## 2025-02-12 PROCEDURE — 36415 COLL VENOUS BLD VENIPUNCTURE: CPT

## 2025-02-12 PROCEDURE — 84443 ASSAY THYROID STIM HORMONE: CPT

## 2025-02-12 PROCEDURE — 80048 BASIC METABOLIC PNL TOTAL CA: CPT

## 2025-02-12 PROCEDURE — 99215 OFFICE O/P EST HI 40 MIN: CPT | Performed by: STUDENT IN AN ORGANIZED HEALTH CARE EDUCATION/TRAINING PROGRAM

## 2025-02-12 PROCEDURE — G2211 COMPLEX E/M VISIT ADD ON: HCPCS | Performed by: STUDENT IN AN ORGANIZED HEALTH CARE EDUCATION/TRAINING PROGRAM

## 2025-02-12 NOTE — PATIENT INSTRUCTIONS
Goal BP < 130/80  Call if going up into the 140's/80-90's often,   Can add back on long acting, once a day version of metoprolol.     Please call [facility]'s Central Scheduling to make an appt: [phone]

## 2025-02-12 NOTE — PROGRESS NOTES
Name: Beronica Villalpando      : 1955      MRN: [id number]  Encounter Provider: Clare Munroe DO  Encounter Date: 2025   Encounter department: [facility] [address]  :  Assessment & Plan  Type 2 diabetes mellitus without complication, without long-term current use of insulin (HCC)    Lab Results   Component Value Date    HGBA1C 5.8 10/02/2024   On mounjaro, working well. Labs pended already.        Pure hypercholesterolemia  On zocor daily, compliant. 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 No recent issues.          Acquired hypothyroidism  Check TSH today.   On levo 100 mcg qd at this time. Did lose a lot of weight though.       Supraspinatus tendinitis, right  Declined PT at this time.   Likely overuse, RHD, induced tendonitis and bursitis.   Can do steroid injection if it doesn't get better.   Orders:    XR shoulder 2+ vw right; Future    Subacromial bursitis of right shoulder joint  Eval for OA on XR.  Orders:    XR shoulder 2+ vw right; Future    Sebaceous cyst  Small sub cm, area, on chest, non TTP, black opening, no purulent discharge.  No concern for malignancy at this time.   Apply baby powder to area on gluteal cleft, keep clean and dry.          Return in about 5 months (around 2025) for Annual Medicare Wellness.    Call to schedule Mammo, had been re-scheduled.      History of Present Illness   Chief Complaint   Patient presents with    Follow-up     Lower back pain   Lump on breast bone    HPI    R shoulder pain, cracking a lot.   RHD, no prior XR's, no prior surgeries.     July 191 lbs, on mounjaro 7.5 mg currently, never higher.   No issues with SE's.   Lost 50 lbs   Never had BF normally  Lunch - veggies   Dinner - soup & half sandwich  Less sweet tooth and  "cravings.     Taking PPI daily, can feel it if she skips it.     Had lower BP's stopped one tab of her metoprolol per day.   Only taking it in the morning, not on meds.   Wt Readings from Last 3 Encounters:   02/12/25 64 kg (141 lb)   10/02/24 75.7 kg (166 lb 12.8 oz)   09/16/24 75.8 kg (167 lb)     Temp Readings from Last 3 Encounters:   09/16/24 97.8 °F (36.6 °C) (Tympanic)   12/19/23 97.9 °F (36.6 °C)   03/15/23 98.2 °F (36.8 °C) (Temporal)     BP Readings from Last 3 Encounters:   02/12/25 110/68   10/02/24 110/68   09/16/24 110/60     Pulse Readings from Last 3 Encounters:   02/12/25 68   10/02/24 72   09/16/24 71     Review of Systems   Constitutional:  Negative for chills and fever.   Respiratory:  Negative for cough and shortness of breath.    Cardiovascular:  Negative for chest pain and palpitations.   Gastrointestinal:  Positive for diarrhea (occas). Negative for constipation.   Neurological:  Positive for dizziness and light-headedness (when first getting out of bed). Negative for headaches.     Objective   /68   Pulse 68   Ht 5' 1\" (1.549 m)   Wt 64 kg (141 lb)   LMP  (LMP Unknown)   SpO2 97%   BMI 26.64 kg/m²      Physical Exam  Vitals and nursing note reviewed.   Constitutional:       General: She is not in acute distress.     Appearance: Normal appearance. She is well-developed and normal weight. She is not ill-appearing.   HENT:      Head: Normocephalic and atraumatic.   Eyes:      General: No scleral icterus.        Right eye: No discharge.         Left eye: No discharge.      Conjunctiva/sclera: Conjunctivae normal.   Cardiovascular:      Rate and Rhythm: Normal rate and regular rhythm.      Pulses: Normal pulses.      Heart sounds: Normal heart sounds. No murmur heard.  Pulmonary:      Effort: Pulmonary effort is normal. No respiratory distress.      Breath sounds: Normal breath sounds.   Musculoskeletal:         General: Tenderness (R AC jt, and R RTC insertion) present.      Cervical " back: Normal range of motion and neck supple. No rigidity.      Right lower leg: No edema.      Left lower leg: No edema.      Comments: R shoulder RTC strong, but supra painful, and reis & neer's +    Skin:     General: Skin is warm and dry.      Capillary Refill: Capillary refill takes less than 2 seconds.      Comments: Small sub cm, area, on midline chest, non TTP, black opening, no purulent discharge.  Small area on superior intergluteal cleft, 1 cm, slightly red, skin tearing, TTP, no mass on buttocks found as pt described.    Neurological:      Mental Status: She is alert and oriented to person, place, and time.      Gait: Gait normal.   Psychiatric:         Mood and Affect: Mood normal.         Behavior: Behavior normal.         Thought Content: Thought content normal.         Judgment: Judgment normal.     I have spent a total time of 45 minutes in caring for this patient on the day of the visit/encounter including Patient and family education, Importance of tx compliance, Impressions, Documenting in the medical record, and Reviewing / ordering tests, medicine, procedures  .

## 2025-02-12 NOTE — ASSESSMENT & PLAN NOTE
Lab Results   Component Value Date    HGBA1C 5.8 10/02/2024   On mounjaro, working well. Labs pended already.

## 2025-02-12 NOTE — ASSESSMENT & PLAN NOTE
Lab Results   Component Value Date    HGBA1C 5.8 10/02/2024   F/W [facility]. No recent issues.

## 2025-02-13 LAB
ANION GAP SERPL CALCULATED.3IONS-SCNC: 12 MMOL/L (ref 4–13)
BUN SERPL-MCNC: 18 MG/DL (ref 5–25)
CALCIUM SERPL-MCNC: 9.5 MG/DL (ref 8.4–10.2)
CHLORIDE SERPL-SCNC: 99 MMOL/L (ref 96–108)
CHOLEST SERPL-MCNC: 118 MG/DL (ref ?–200)
CO2 SERPL-SCNC: 31 MMOL/L (ref 21–32)
CREAT SERPL-MCNC: 0.88 MG/DL (ref 0.6–1.3)
GFR SERPL CREATININE-BSD FRML MDRD: 68 ML/MIN/1.73SQ M
GLUCOSE P FAST SERPL-MCNC: 96 MG/DL (ref 65–99)
HDLC SERPL-MCNC: 37 MG/DL
LDLC SERPL CALC-MCNC: 53 MG/DL (ref 0–100)
POTASSIUM SERPL-SCNC: 3.2 MMOL/L (ref 3.5–5.3)
SODIUM SERPL-SCNC: 142 MMOL/L (ref 135–147)
TRIGL SERPL-MCNC: 141 MG/DL (ref ?–150)

## 2025-02-13 RX ORDER — POTASSIUM CHLORIDE 1500 MG/1
20 TABLET, EXTENDED RELEASE ORAL 2 TIMES DAILY
Qty: 6 TABLET | Refills: 0 | Status: SHIPPED | OUTPATIENT
Start: 2025-02-13 | End: 2025-02-16

## 2025-03-06 DIAGNOSIS — E66.812 CLASS 2 OBESITY DUE TO EXCESS CALORIES WITHOUT SERIOUS COMORBIDITY WITH BODY MASS INDEX (BMI) OF 36.0 TO 36.9 IN ADULT: ICD-10-CM

## 2025-03-06 DIAGNOSIS — E11.9 TYPE 2 DIABETES MELLITUS WITHOUT COMPLICATION, WITHOUT LONG-TERM CURRENT USE OF INSULIN (HCC): ICD-10-CM

## 2025-03-06 DIAGNOSIS — E66.09 CLASS 2 OBESITY DUE TO EXCESS CALORIES WITHOUT SERIOUS COMORBIDITY WITH BODY MASS INDEX (BMI) OF 36.0 TO 36.9 IN ADULT: ICD-10-CM

## 2025-03-06 RX ORDER — TIRZEPATIDE 7.5 MG/.5ML
7.5 INJECTION, SOLUTION SUBCUTANEOUS WEEKLY
Qty: 2 ML | Refills: 0 | Status: SHIPPED | OUTPATIENT
Start: 2025-03-06

## 2025-03-24 ENCOUNTER — TELEPHONE (OUTPATIENT)
Dept: FAMILY MEDICINE CLINIC | Facility: HOSPITAL | Age: 70
End: 2025-03-24

## 2025-03-24 NOTE — TELEPHONE ENCOUNTER
It is probably the weight loss. Sort of drastic for her body.   Can try otc Nutrofol, or use otc rogaine women's shampoo, it does have data to say it helps.   Other supplements are biotin, niacin, stuff like women's vitamins for hair & nails

## 2025-03-27 LAB
LEFT EYE DIABETIC RETINOPATHY: POSITIVE
RIGHT EYE DIABETIC RETINOPATHY: POSITIVE

## 2025-04-10 DIAGNOSIS — E66.09 CLASS 2 OBESITY DUE TO EXCESS CALORIES WITHOUT SERIOUS COMORBIDITY WITH BODY MASS INDEX (BMI) OF 36.0 TO 36.9 IN ADULT: ICD-10-CM

## 2025-04-10 DIAGNOSIS — M85.852 OSTEOPENIA OF LEFT HIP: ICD-10-CM

## 2025-04-10 DIAGNOSIS — E11.9 TYPE 2 DIABETES MELLITUS WITHOUT COMPLICATION, WITHOUT LONG-TERM CURRENT USE OF INSULIN (HCC): ICD-10-CM

## 2025-04-10 DIAGNOSIS — E66.812 CLASS 2 OBESITY DUE TO EXCESS CALORIES WITHOUT SERIOUS COMORBIDITY WITH BODY MASS INDEX (BMI) OF 36.0 TO 36.9 IN ADULT: ICD-10-CM

## 2025-04-10 RX ORDER — TIRZEPATIDE 7.5 MG/.5ML
7.5 INJECTION, SOLUTION SUBCUTANEOUS WEEKLY
Qty: 6 ML | Refills: 0 | Status: SHIPPED | OUTPATIENT
Start: 2025-04-10

## 2025-04-10 NOTE — TELEPHONE ENCOUNTER
Reason for call:   [x] Refill   [] Prior Auth  [] Other:     Office:   [x] PCP/Provider -   [] Specialty/Provider -     Medication: Tirzepatide (Mounjaro) 7.5 MG/0.5ML     Dose/Frequency: Inject 7.5 mg under the skin once a week     Quantity: 2 mL    Pharmacy: [facility] - [address] [place] [address] Pharmacy   Does the patient have enough for 3 days?   [] Yes   [x] No - Send as HP to POD    Mail Away Pharmacy   Does the patient have enough for 10 days?   [] Yes   [] No - Send as HP to POD

## 2025-04-11 LAB
LEFT EYE DIABETIC RETINOPATHY: POSITIVE
RIGHT EYE DIABETIC RETINOPATHY: POSITIVE

## 2025-04-11 RX ORDER — B-COMPLEX WITH VITAMIN C
1 TABLET ORAL 2 TIMES DAILY WITH MEALS
Qty: 180 TABLET | Refills: 1 | Status: SHIPPED | OUTPATIENT
Start: 2025-04-11

## 2025-05-10 DIAGNOSIS — E03.9 ACQUIRED HYPOTHYROIDISM: ICD-10-CM

## 2025-05-10 DIAGNOSIS — E87.6 HYPOKALEMIA: ICD-10-CM

## 2025-05-10 DIAGNOSIS — E11.9 TYPE 2 DIABETES MELLITUS WITHOUT COMPLICATION, WITHOUT LONG-TERM CURRENT USE OF INSULIN (HCC): Primary | ICD-10-CM

## 2025-05-12 ENCOUNTER — TELEPHONE (OUTPATIENT)
Dept: FAMILY MEDICINE CLINIC | Facility: HOSPITAL | Age: 70
End: 2025-05-12

## 2025-05-12 DIAGNOSIS — E11.9 TYPE 2 DIABETES MELLITUS WITHOUT COMPLICATION, WITHOUT LONG-TERM CURRENT USE OF INSULIN (HCC): Primary | ICD-10-CM

## 2025-05-12 RX ORDER — TIRZEPATIDE 10 MG/.5ML
10 INJECTION, SOLUTION SUBCUTANEOUS WEEKLY
Qty: 2 ML | Refills: 0 | Status: SHIPPED | OUTPATIENT
Start: 2025-05-12

## 2025-05-12 NOTE — TELEPHONE ENCOUNTER
Patient called the RX Refill Line. Message is being forwarded to the office.     Patient is requesting the next strength of Mounjaro be sent to [facility] [address] [place].  Patient is out of medication and next dose due 5/13/25    Please contact patient at [phone]

## 2025-05-14 ENCOUNTER — APPOINTMENT (OUTPATIENT)
Dept: LAB | Facility: HOSPITAL | Age: 70
End: 2025-05-14
Payer: MEDICARE

## 2025-05-14 DIAGNOSIS — E11.9 TYPE 2 DIABETES MELLITUS WITHOUT COMPLICATION, WITHOUT LONG-TERM CURRENT USE OF INSULIN (HCC): ICD-10-CM

## 2025-05-14 DIAGNOSIS — E87.6 HYPOKALEMIA: ICD-10-CM

## 2025-05-14 DIAGNOSIS — E03.9 ACQUIRED HYPOTHYROIDISM: ICD-10-CM

## 2025-05-14 LAB
ANION GAP SERPL CALCULATED.3IONS-SCNC: 7 MMOL/L (ref 4–13)
BUN SERPL-MCNC: 14 MG/DL (ref 5–25)
CALCIUM SERPL-MCNC: 9.3 MG/DL (ref 8.4–10.2)
CHLORIDE SERPL-SCNC: 104 MMOL/L (ref 96–108)
CO2 SERPL-SCNC: 30 MMOL/L (ref 21–32)
CREAT SERPL-MCNC: 0.85 MG/DL (ref 0.6–1.3)
CREAT UR-MCNC: 137.3 MG/DL
GFR SERPL CREATININE-BSD FRML MDRD: 70 ML/MIN/1.73SQ M
GLUCOSE P FAST SERPL-MCNC: 76 MG/DL (ref 65–99)
MICROALBUMIN UR-MCNC: 17.6 MG/L
MICROALBUMIN/CREAT 24H UR: 13 MG/G CREATININE (ref 0–30)
POTASSIUM SERPL-SCNC: 4 MMOL/L (ref 3.5–5.3)
SODIUM SERPL-SCNC: 141 MMOL/L (ref 135–147)
T4 FREE SERPL-MCNC: 0.86 NG/DL (ref 0.61–1.12)
TSH SERPL DL<=0.05 MIU/L-ACNC: 2.64 UIU/ML (ref 0.45–4.5)

## 2025-05-14 PROCEDURE — 82043 UR ALBUMIN QUANTITATIVE: CPT

## 2025-05-14 PROCEDURE — 82570 ASSAY OF URINE CREATININE: CPT

## 2025-05-14 PROCEDURE — 36415 COLL VENOUS BLD VENIPUNCTURE: CPT

## 2025-05-14 PROCEDURE — 84439 ASSAY OF FREE THYROXINE: CPT

## 2025-05-14 PROCEDURE — 80048 BASIC METABOLIC PNL TOTAL CA: CPT

## 2025-05-14 PROCEDURE — 84443 ASSAY THYROID STIM HORMONE: CPT

## 2025-05-15 ENCOUNTER — TELEPHONE (OUTPATIENT)
Dept: FAMILY MEDICINE CLINIC | Facility: HOSPITAL | Age: 70
End: 2025-05-15

## 2025-05-15 ENCOUNTER — RESULTS FOLLOW-UP (OUTPATIENT)
Dept: FAMILY MEDICINE CLINIC | Facility: HOSPITAL | Age: 70
End: 2025-05-15

## 2025-05-15 DIAGNOSIS — J30.81 ALLERGIC RHINITIS DUE TO ANIMAL (CAT) (DOG) HAIR AND DANDER: Primary | ICD-10-CM

## 2025-05-15 DIAGNOSIS — K21.9 GASTROESOPHAGEAL REFLUX DISEASE WITHOUT ESOPHAGITIS: ICD-10-CM

## 2025-05-15 RX ORDER — FLUTICASONE PROPIONATE 50 MCG
1 SPRAY, SUSPENSION (ML) NASAL DAILY
Qty: 16 G | Refills: 2 | Status: SHIPPED | OUTPATIENT
Start: 2025-05-15

## 2025-05-15 NOTE — TELEPHONE ENCOUNTER
Patient is calling requesting a refill of fluticasone (FLONASE) 50 mcg/act nasal spray   No longer on  active medication list . Patient states she has a runny nose. [facility] to  [facility] on file    Call patient with update/sent [phone]

## 2025-05-16 RX ORDER — OMEPRAZOLE 20 MG/1
20 CAPSULE, DELAYED RELEASE ORAL DAILY
Qty: 90 CAPSULE | Refills: 1 | Status: SHIPPED | OUTPATIENT
Start: 2025-05-16

## 2025-06-16 DIAGNOSIS — E11.9 TYPE 2 DIABETES MELLITUS WITHOUT COMPLICATION, WITHOUT LONG-TERM CURRENT USE OF INSULIN (HCC): ICD-10-CM

## 2025-06-16 RX ORDER — BLOOD SUGAR DIAGNOSTIC
STRIP MISCELLANEOUS 4 TIMES DAILY
Qty: 400 STRIP | Refills: 1 | Status: SHIPPED | OUTPATIENT
Start: 2025-06-16

## 2025-06-24 DIAGNOSIS — E11.9 TYPE 2 DIABETES MELLITUS WITHOUT COMPLICATION, WITHOUT LONG-TERM CURRENT USE OF INSULIN (HCC): ICD-10-CM

## 2025-06-25 RX ORDER — TIRZEPATIDE 10 MG/.5ML
10 INJECTION, SOLUTION SUBCUTANEOUS WEEKLY
Qty: 2 ML | Refills: 0 | Status: SHIPPED | OUTPATIENT
Start: 2025-06-25

## 2025-07-09 ENCOUNTER — RA CDI HCC (OUTPATIENT)
Dept: OTHER | Facility: HOSPITAL | Age: 70
End: 2025-07-09

## 2025-07-11 ENCOUNTER — OFFICE VISIT (OUTPATIENT)
Dept: FAMILY MEDICINE CLINIC | Facility: HOSPITAL | Age: 70
End: 2025-07-11
Payer: MEDICARE

## 2025-07-11 VITALS
WEIGHT: 130.2 LBS | SYSTOLIC BLOOD PRESSURE: 130 MMHG | HEIGHT: 61 IN | OXYGEN SATURATION: 98 % | BODY MASS INDEX: 24.58 KG/M2 | HEART RATE: 67 BPM | DIASTOLIC BLOOD PRESSURE: 78 MMHG

## 2025-07-11 DIAGNOSIS — M85.852 OSTEOPENIA OF LEFT HIP: ICD-10-CM

## 2025-07-11 DIAGNOSIS — M89.9 DISORDER OF BONE, UNSPECIFIED: ICD-10-CM

## 2025-07-11 DIAGNOSIS — Z00.00 MEDICARE ANNUAL WELLNESS VISIT, SUBSEQUENT: Primary | ICD-10-CM

## 2025-07-11 DIAGNOSIS — E03.9 ACQUIRED HYPOTHYROIDISM: ICD-10-CM

## 2025-07-11 DIAGNOSIS — E78.00 PURE HYPERCHOLESTEROLEMIA: ICD-10-CM

## 2025-07-11 DIAGNOSIS — E11.9 TYPE 2 DIABETES MELLITUS WITHOUT COMPLICATION, WITHOUT LONG-TERM CURRENT USE OF INSULIN (HCC): ICD-10-CM

## 2025-07-11 PROBLEM — F33.9 DEPRESSION, RECURRENT (HCC): Status: RESOLVED | Noted: 2022-08-25 | Resolved: 2025-07-11

## 2025-07-11 PROBLEM — M25.532 LEFT WRIST PAIN: Status: RESOLVED | Noted: 2023-04-07 | Resolved: 2025-07-11

## 2025-07-11 PROBLEM — F43.20 GRIEF REACTION: Status: RESOLVED | Noted: 2022-01-14 | Resolved: 2025-07-11

## 2025-07-11 PROBLEM — F41.9 ANXIETY: Status: RESOLVED | Noted: 2017-07-20 | Resolved: 2025-07-11

## 2025-07-11 LAB — SL AMB POCT HEMOGLOBIN AIC: 5 (ref ?–6.5)

## 2025-07-11 PROCEDURE — G0439 PPPS, SUBSEQ VISIT: HCPCS | Performed by: STUDENT IN AN ORGANIZED HEALTH CARE EDUCATION/TRAINING PROGRAM

## 2025-07-11 PROCEDURE — 83036 HEMOGLOBIN GLYCOSYLATED A1C: CPT | Performed by: STUDENT IN AN ORGANIZED HEALTH CARE EDUCATION/TRAINING PROGRAM

## 2025-07-11 RX ORDER — B-COMPLEX WITH VITAMIN C
1 TABLET ORAL
Qty: 180 TABLET | Refills: 1 | Status: SHIPPED | OUTPATIENT
Start: 2025-07-11

## 2025-07-29 DIAGNOSIS — I10 BENIGN ESSENTIAL HYPERTENSION: ICD-10-CM

## 2025-07-29 DIAGNOSIS — E03.9 ACQUIRED HYPOTHYROIDISM: ICD-10-CM

## 2025-07-29 DIAGNOSIS — E11.9 TYPE 2 DIABETES MELLITUS WITHOUT COMPLICATION, WITHOUT LONG-TERM CURRENT USE OF INSULIN (HCC): ICD-10-CM

## 2025-07-30 RX ORDER — VALSARTAN AND HYDROCHLOROTHIAZIDE 160; 25 MG/1; MG/1
1 TABLET ORAL DAILY
Qty: 90 TABLET | Refills: 1 | Status: SHIPPED | OUTPATIENT
Start: 2025-07-30

## 2025-07-30 RX ORDER — LEVOTHYROXINE SODIUM 100 UG/1
100 TABLET ORAL DAILY
Qty: 90 TABLET | Refills: 1 | Status: SHIPPED | OUTPATIENT
Start: 2025-07-30

## 2025-07-30 RX ORDER — SIMVASTATIN 20 MG
20 TABLET ORAL DAILY
Qty: 90 TABLET | Refills: 1 | Status: SHIPPED | OUTPATIENT
Start: 2025-07-30

## 2025-07-31 DIAGNOSIS — E11.9 TYPE 2 DIABETES MELLITUS WITHOUT COMPLICATION, WITHOUT LONG-TERM CURRENT USE OF INSULIN (HCC): ICD-10-CM

## 2025-07-31 DIAGNOSIS — I10 BENIGN ESSENTIAL HYPERTENSION: ICD-10-CM

## 2025-07-31 DIAGNOSIS — E03.9 ACQUIRED HYPOTHYROIDISM: ICD-10-CM

## 2025-08-01 ENCOUNTER — APPOINTMENT (OUTPATIENT)
Dept: LAB | Facility: HOSPITAL | Age: 70
End: 2025-08-01
Payer: MEDICARE

## 2025-08-01 DIAGNOSIS — E03.9 ACQUIRED HYPOTHYROIDISM: ICD-10-CM

## 2025-08-01 DIAGNOSIS — M85.852 OSTEOPENIA OF LEFT HIP: ICD-10-CM

## 2025-08-01 DIAGNOSIS — M89.9 DISORDER OF BONE, UNSPECIFIED: ICD-10-CM

## 2025-08-01 DIAGNOSIS — E78.00 PURE HYPERCHOLESTEROLEMIA: ICD-10-CM

## 2025-08-01 DIAGNOSIS — E11.9 TYPE 2 DIABETES MELLITUS WITHOUT COMPLICATION, WITHOUT LONG-TERM CURRENT USE OF INSULIN (HCC): ICD-10-CM

## 2025-08-01 LAB
25(OH)D3 SERPL-MCNC: 22.9 NG/ML (ref 30–100)
ALBUMIN SERPL BCG-MCNC: 4.3 G/DL (ref 3.5–5)
ALP SERPL-CCNC: 83 U/L (ref 34–104)
ALT SERPL W P-5'-P-CCNC: 7 U/L (ref 7–52)
ANION GAP SERPL CALCULATED.3IONS-SCNC: 7 MMOL/L (ref 4–13)
AST SERPL W P-5'-P-CCNC: 12 U/L (ref 13–39)
BASOPHILS # BLD AUTO: 0.05 THOUSANDS/ÂΜL (ref 0–0.1)
BASOPHILS NFR BLD AUTO: 0 % (ref 0–1)
BILIRUB SERPL-MCNC: 0.64 MG/DL (ref 0.2–1)
BUN SERPL-MCNC: 10 MG/DL (ref 5–25)
CALCIUM SERPL-MCNC: 9.5 MG/DL (ref 8.4–10.2)
CHLORIDE SERPL-SCNC: 100 MMOL/L (ref 96–108)
CHOLEST SERPL-MCNC: 146 MG/DL (ref ?–200)
CO2 SERPL-SCNC: 32 MMOL/L (ref 21–32)
CREAT SERPL-MCNC: 0.8 MG/DL (ref 0.6–1.3)
EOSINOPHIL # BLD AUTO: 0.27 THOUSAND/ÂΜL (ref 0–0.61)
EOSINOPHIL NFR BLD AUTO: 2 % (ref 0–6)
ERYTHROCYTE [DISTWIDTH] IN BLOOD BY AUTOMATED COUNT: 13 % (ref 11.6–15.1)
EST. AVERAGE GLUCOSE BLD GHB EST-MCNC: 108 MG/DL
GFR SERPL CREATININE-BSD FRML MDRD: 75 ML/MIN/1.73SQ M
GLUCOSE P FAST SERPL-MCNC: 86 MG/DL (ref 65–99)
HBA1C MFR BLD: 5.4 %
HCT VFR BLD AUTO: 39.3 % (ref 34.8–46.1)
HDLC SERPL-MCNC: 55 MG/DL
HGB BLD-MCNC: 12.8 G/DL (ref 11.5–15.4)
IMM GRANULOCYTES # BLD AUTO: 0.05 THOUSAND/UL (ref 0–0.2)
IMM GRANULOCYTES NFR BLD AUTO: 0 % (ref 0–2)
LDLC SERPL CALC-MCNC: 72 MG/DL (ref 0–100)
LYMPHOCYTES # BLD AUTO: 3.05 THOUSANDS/ÂΜL (ref 0.6–4.47)
LYMPHOCYTES NFR BLD AUTO: 25 % (ref 14–44)
MCH RBC QN AUTO: 29.1 PG (ref 26.8–34.3)
MCHC RBC AUTO-ENTMCNC: 32.6 G/DL (ref 31.4–37.4)
MCV RBC AUTO: 89 FL (ref 82–98)
MONOCYTES # BLD AUTO: 0.72 THOUSAND/ÂΜL (ref 0.17–1.22)
MONOCYTES NFR BLD AUTO: 6 % (ref 4–12)
NEUTROPHILS # BLD AUTO: 7.87 THOUSANDS/ÂΜL (ref 1.85–7.62)
NEUTS SEG NFR BLD AUTO: 67 % (ref 43–75)
NRBC BLD AUTO-RTO: 0 /100 WBCS
PLATELET # BLD AUTO: 268 THOUSANDS/UL (ref 149–390)
PMV BLD AUTO: 11.5 FL (ref 8.9–12.7)
POTASSIUM SERPL-SCNC: 3.4 MMOL/L (ref 3.5–5.3)
PROT SERPL-MCNC: 7.3 G/DL (ref 6.4–8.4)
RBC # BLD AUTO: 4.4 MILLION/UL (ref 3.81–5.12)
SODIUM SERPL-SCNC: 139 MMOL/L (ref 135–147)
TRIGL SERPL-MCNC: 97 MG/DL (ref ?–150)
TSH SERPL DL<=0.05 MIU/L-ACNC: 2.13 UIU/ML (ref 0.45–4.5)
WBC # BLD AUTO: 12.01 THOUSAND/UL (ref 4.31–10.16)

## 2025-08-01 PROCEDURE — 83036 HEMOGLOBIN GLYCOSYLATED A1C: CPT

## 2025-08-01 PROCEDURE — 80061 LIPID PANEL: CPT

## 2025-08-01 PROCEDURE — 84443 ASSAY THYROID STIM HORMONE: CPT

## 2025-08-01 PROCEDURE — 80053 COMPREHEN METABOLIC PANEL: CPT

## 2025-08-01 PROCEDURE — 82306 VITAMIN D 25 HYDROXY: CPT

## 2025-08-01 PROCEDURE — 36415 COLL VENOUS BLD VENIPUNCTURE: CPT

## 2025-08-01 PROCEDURE — 85025 COMPLETE CBC W/AUTO DIFF WBC: CPT

## 2025-08-01 RX ORDER — LEVOTHYROXINE SODIUM 100 UG/1
100 TABLET ORAL DAILY
Qty: 90 TABLET | Refills: 0 | OUTPATIENT
Start: 2025-08-01

## 2025-08-01 RX ORDER — SIMVASTATIN 20 MG
20 TABLET ORAL DAILY
Qty: 90 TABLET | Refills: 0 | OUTPATIENT
Start: 2025-08-01

## 2025-08-01 RX ORDER — TIRZEPATIDE 10 MG/.5ML
10 INJECTION, SOLUTION SUBCUTANEOUS WEEKLY
Qty: 2 ML | Refills: 0 | Status: SHIPPED | OUTPATIENT
Start: 2025-08-01

## 2025-08-01 RX ORDER — VALSARTAN AND HYDROCHLOROTHIAZIDE 160; 25 MG/1; MG/1
1 TABLET ORAL DAILY
Qty: 90 TABLET | Refills: 0 | OUTPATIENT
Start: 2025-08-01